# Patient Record
Sex: FEMALE | Race: WHITE | NOT HISPANIC OR LATINO | Employment: OTHER | URBAN - METROPOLITAN AREA
[De-identification: names, ages, dates, MRNs, and addresses within clinical notes are randomized per-mention and may not be internally consistent; named-entity substitution may affect disease eponyms.]

---

## 2017-01-25 RX ORDER — ARIPIPRAZOLE 5 MG/1
TABLET ORAL
Qty: 90 TAB | Refills: 0 | Status: SHIPPED | OUTPATIENT
Start: 2017-01-25 | End: 2017-05-18 | Stop reason: SDUPTHER

## 2017-01-26 RX ORDER — TIOTROPIUM BROMIDE 18 UG/1
CAPSULE ORAL; RESPIRATORY (INHALATION)
Qty: 30 CAP | Refills: 2 | Status: SHIPPED | OUTPATIENT
Start: 2017-01-26 | End: 2018-01-29 | Stop reason: SDUPTHER

## 2017-01-26 NOTE — TELEPHONE ENCOUNTER
Was the patient seen in the last year in this department? Yes     Does patient have an active prescription for medications requested? No     Received Request Via: Patient      Pt met protocol?: Yes  OV 12/29/16

## 2017-02-14 ENCOUNTER — APPOINTMENT (OUTPATIENT)
Dept: BEHAVIORAL HEALTH | Facility: PHYSICIAN GROUP | Age: 77
End: 2017-02-14
Payer: MEDICARE

## 2017-02-27 RX ORDER — LORAZEPAM 0.5 MG/1
TABLET ORAL
Qty: 30 TAB | Refills: 0 | Status: ON HOLD
Start: 2017-02-27 | End: 2018-03-29

## 2017-02-27 RX ORDER — FLUTICASONE PROPIONATE 50 MCG
SPRAY, SUSPENSION (ML) NASAL
Qty: 1 BOTTLE | Refills: 1 | Status: SHIPPED | OUTPATIENT
Start: 2017-02-27 | End: 2017-12-19 | Stop reason: SDUPTHER

## 2017-02-27 RX ORDER — LAMOTRIGINE 100 MG/1
TABLET ORAL
Qty: 270 TAB | Refills: 1 | Status: SHIPPED | OUTPATIENT
Start: 2017-02-27 | End: 2017-07-26

## 2017-02-27 NOTE — TELEPHONE ENCOUNTER
Was the patient seen in the last year in this department? Yes     Does patient have an active prescription for medications requested? No     Received Request Via: Pharmacy      Pt met protocol?: Yes, OV 12/16.

## 2017-03-14 ENCOUNTER — HOSPITAL ENCOUNTER (OUTPATIENT)
Dept: RADIOLOGY | Facility: MEDICAL CENTER | Age: 77
End: 2017-03-14
Attending: NURSE PRACTITIONER
Payer: MEDICARE

## 2017-03-14 DIAGNOSIS — M25.531 RIGHT WRIST PAIN: ICD-10-CM

## 2017-03-14 DIAGNOSIS — M25.562 ARTHRALGIA OF LEFT LOWER LEG: ICD-10-CM

## 2017-03-14 DIAGNOSIS — M25.561 ARTHRALGIA OF RIGHT LOWER LEG: ICD-10-CM

## 2017-03-14 PROCEDURE — 73564 X-RAY EXAM KNEE 4 OR MORE: CPT | Mod: RT

## 2017-03-14 PROCEDURE — 73110 X-RAY EXAM OF WRIST: CPT | Mod: RT

## 2017-03-16 ENCOUNTER — HOSPITAL ENCOUNTER (OUTPATIENT)
Dept: LAB | Facility: MEDICAL CENTER | Age: 77
End: 2017-03-16
Attending: NURSE PRACTITIONER
Payer: MEDICARE

## 2017-03-16 DIAGNOSIS — E11.69 HYPERLIPIDEMIA ASSOCIATED WITH TYPE 2 DIABETES MELLITUS (HCC): ICD-10-CM

## 2017-03-16 DIAGNOSIS — E89.0 HYPOTHYROIDISM, POSTRADIOIODINE THERAPY: ICD-10-CM

## 2017-03-16 DIAGNOSIS — E78.5 HYPERLIPIDEMIA ASSOCIATED WITH TYPE 2 DIABETES MELLITUS (HCC): ICD-10-CM

## 2017-03-16 LAB
CHOLEST SERPL-MCNC: 120 MG/DL (ref 100–199)
HDLC SERPL-MCNC: 62 MG/DL
LDLC SERPL CALC-MCNC: 44 MG/DL
T4 FREE SERPL-MCNC: 1.15 NG/DL (ref 0.53–1.43)
TRIGL SERPL-MCNC: 68 MG/DL (ref 0–149)
TSH SERPL DL<=0.005 MIU/L-ACNC: 0.98 UIU/ML (ref 0.3–3.7)

## 2017-03-16 PROCEDURE — 84439 ASSAY OF FREE THYROXINE: CPT

## 2017-03-16 PROCEDURE — 36415 COLL VENOUS BLD VENIPUNCTURE: CPT

## 2017-03-16 PROCEDURE — 84443 ASSAY THYROID STIM HORMONE: CPT

## 2017-04-03 ENCOUNTER — TELEPHONE (OUTPATIENT)
Dept: VASCULAR LAB | Facility: MEDICAL CENTER | Age: 77
End: 2017-04-03

## 2017-04-03 DIAGNOSIS — I65.22 LEFT CAROTID ARTERY STENOSIS: ICD-10-CM

## 2017-04-03 RX ORDER — LEVOTHYROXINE SODIUM 0.07 MG/1
TABLET ORAL
Qty: 90 TAB | Refills: 2 | Status: SHIPPED | OUTPATIENT
Start: 2017-04-03 | End: 2017-08-09 | Stop reason: SDUPTHER

## 2017-04-04 ENCOUNTER — OFFICE VISIT (OUTPATIENT)
Dept: VASCULAR LAB | Facility: MEDICAL CENTER | Age: 77
End: 2017-04-04
Attending: INTERNAL MEDICINE
Payer: MEDICARE

## 2017-04-04 VITALS
SYSTOLIC BLOOD PRESSURE: 96 MMHG | HEART RATE: 95 BPM | HEIGHT: 55 IN | BODY MASS INDEX: 24.53 KG/M2 | DIASTOLIC BLOOD PRESSURE: 48 MMHG | WEIGHT: 106 LBS

## 2017-04-04 DIAGNOSIS — I25.10 CORONARY ARTERY DISEASE DUE TO LIPID RICH PLAQUE: ICD-10-CM

## 2017-04-04 DIAGNOSIS — I65.22 LEFT CAROTID ARTERY STENOSIS: ICD-10-CM

## 2017-04-04 DIAGNOSIS — F17.200 TOBACCO DEPENDENCE: ICD-10-CM

## 2017-04-04 DIAGNOSIS — E03.9 HYPOTHYROIDISM, UNSPECIFIED TYPE: ICD-10-CM

## 2017-04-04 DIAGNOSIS — I25.83 CORONARY ARTERY DISEASE DUE TO LIPID RICH PLAQUE: ICD-10-CM

## 2017-04-04 DIAGNOSIS — I73.9 PAD (PERIPHERAL ARTERY DISEASE) (HCC): ICD-10-CM

## 2017-04-04 DIAGNOSIS — E11.9 CONTROLLED TYPE 2 DIABETES MELLITUS WITHOUT COMPLICATION, WITHOUT LONG-TERM CURRENT USE OF INSULIN (HCC): ICD-10-CM

## 2017-04-04 DIAGNOSIS — M79.89 LEG SWELLING: ICD-10-CM

## 2017-04-04 DIAGNOSIS — E11.69 HYPERLIPIDEMIA ASSOCIATED WITH TYPE 2 DIABETES MELLITUS (HCC): ICD-10-CM

## 2017-04-04 DIAGNOSIS — E78.5 HYPERLIPIDEMIA ASSOCIATED WITH TYPE 2 DIABETES MELLITUS (HCC): ICD-10-CM

## 2017-04-04 PROCEDURE — 99214 OFFICE O/P EST MOD 30 MIN: CPT | Performed by: INTERNAL MEDICINE

## 2017-04-04 PROCEDURE — 4004F PT TOBACCO SCREEN RCVD TLK: CPT | Performed by: INTERNAL MEDICINE

## 2017-04-04 PROCEDURE — 3046F HEMOGLOBIN A1C LEVEL >9.0%: CPT | Mod: 8P | Performed by: INTERNAL MEDICINE

## 2017-04-04 PROCEDURE — 3288F FALL RISK ASSESSMENT DOCD: CPT | Performed by: INTERNAL MEDICINE

## 2017-04-04 PROCEDURE — 1100F PTFALLS ASSESS-DOCD GE2>/YR: CPT | Performed by: INTERNAL MEDICINE

## 2017-04-04 PROCEDURE — 0518F FALL PLAN OF CARE DOCD: CPT | Mod: 8P | Performed by: INTERNAL MEDICINE

## 2017-04-04 PROCEDURE — G8417 CALC BMI ABV UP PARAM F/U: HCPCS | Performed by: INTERNAL MEDICINE

## 2017-04-04 PROCEDURE — G8432 DEP SCR NOT DOC, RNG: HCPCS | Performed by: INTERNAL MEDICINE

## 2017-04-04 PROCEDURE — G8598 ASA/ANTIPLAT THER USED: HCPCS | Performed by: INTERNAL MEDICINE

## 2017-04-04 PROCEDURE — 4040F PNEUMOC VAC/ADMIN/RCVD: CPT | Performed by: INTERNAL MEDICINE

## 2017-04-04 RX ORDER — ROSUVASTATIN CALCIUM 10 MG/1
10 TABLET, COATED ORAL EVERY EVENING
Qty: 30 TAB | Refills: 11 | Status: ON HOLD | OUTPATIENT
Start: 2017-04-04 | End: 2018-03-29

## 2017-04-04 ASSESSMENT — ENCOUNTER SYMPTOMS
NECK PAIN: 1
SHORTNESS OF BREATH: 0
BACK PAIN: 1
DEPRESSION: 0
HEADACHES: 0
PALPITATIONS: 0
LOSS OF CONSCIOUSNESS: 0
SEIZURES: 0
COUGH: 0
FOCAL WEAKNESS: 0
WEIGHT LOSS: 0
MYALGIAS: 0
CLAUDICATION: 0

## 2017-04-04 NOTE — PATIENT INSTRUCTIONS
Pick quit date  On quit dates start nicoderm CQ 14 microgram patch  Can use gum or lozenge as needed     Change zetia to crestor     Call [phone] to schedule vascular studies (neck and legs)    Blood work one week before follow up visit    Follow Up:  June 28 at 340p    Michael Bloch, MD  [facility]  [phone]

## 2017-04-04 NOTE — PROGRESS NOTES
"  Follow Up VASCULAR VISIT  Subjective:   Yuliet Hammond is a 75 y.o. female who presents today 9-30-16 for   Chief Complaint   Patient presents with   • Follow-Up     HPI:  Patient here for follow-up of carotid artery disease, coronary artery disease, peripheral arterial disease, dyspnea, diabetes, hypertension, smoking.  Carotid endarterectomy Dec 2015  She has had no TIA or stroke symptoms. No angina.   Walking limited, no claudication, no pain at rest.    No skin breakdown  Leg swelling about the same  No angina  Taking zetia but no crestor - expensive  BP at home 90s-100s/40s-50s  On losartan hct  Takes furosemide as needed  Taking 1/2 tab of metformin 2x daily  -130 at home  On ASA and pletal-- no bleeding.  Continues to smoke 1 ppd - tried Chantix-- bothersome side effects, cannot chew gum, lozenges help sometimes.  Wants to quit.    DIET AND EXERCISE:  Weight Change:stable  Diet: common adult  Exercise: doing PT exercises every day    Review of Systems   Constitutional: Positive for malaise/fatigue. Negative for weight loss.   Respiratory: Negative for cough and shortness of breath.    Cardiovascular: Negative for chest pain, palpitations, claudication and leg swelling.   Musculoskeletal: Positive for back pain, joint pain and neck pain. Negative for myalgias.   Neurological: Negative for focal weakness, seizures, loss of consciousness and headaches.   Psychiatric/Behavioral: Negative for depression.      Objective:     Filed Vitals:    04/04/17 1131   BP: 96/48   Pulse: 95   Height: 1.334 m (4' 4.52\")   Weight: 48.081 kg (106 lb)      Body mass index is 27.02 kg/(m^2).  Physical Exam   Constitutional: She is oriented to person, place, and time. No distress.   Cardiovascular: Normal rate, regular rhythm and normal heart sounds.    No murmur heard.  Decreased but palpable pulses in both LE     Pulmonary/Chest: Effort normal and breath sounds normal. No respiratory distress. She has no wheezes. She " has no rales.   Musculoskeletal: She exhibits no edema.   Neurological: She is alert and oriented to person, place, and time. No cranial nerve deficit. Coordination normal.   Skin: Skin is warm and dry. She is not diaphoretic.   Psychiatric: She has a normal mood and affect.   Vitals reviewed.    Lab Results   Component Value Date    CHOLSTRLTOT 120 03/16/2017    LDL 44 03/16/2017    HDL 62 03/16/2017    TRIGLYCERIDE 68 03/16/2017         Lab Results   Component Value Date    HBA1C 6.7* 10/07/2016      Lab Results   Component Value Date    SODIUM 131* 10/07/2016    POTASSIUM 3.7 10/07/2016    CHLORIDE 94* 10/07/2016    CO2 26 10/07/2016    GLUCOSE 99 10/07/2016    BUN 16 10/07/2016    CREATININE 0.94 10/07/2016    IFAFRICA >60 10/07/2016    IFNOTAFR 58* 10/07/2016        Lab Results   Component Value Date    WBC 6.3 01/12/2016    RBC 4.09* 01/12/2016    HEMOGLOBIN 12.3 01/12/2016    HEMATOCRIT 38.9 01/12/2016    MCV 95.1 01/12/2016    MCH 30.1 01/12/2016    MCHC 31.6* 01/12/2016    MPV 12.2 01/12/2016      Carotid duplex March 2016  Antegrade flow in both vertebrals. Moderate right internal carotid stenosis. Left carotid endarterectomy site however focal plaque seen at the proximal end of the surgical site appearing to cause a moderate stenosis. There is also some plaque distal to the surgical site which causes some degree of stenosis    CTA Neck: April 2016  Impression        1.  Extensive calcified plaque right carotid artery bifurcation resulting in 70% stenosis at the origin the right internal carotid artery.  2.  Postoperative change left carotid artery. Ulcerated plaque but no significant stenosis identified.  3.  Marked stenosis at the origin of the left vertebral artery. Vertebral arteries otherwise are patent. No dissection.     LE Arterial Dulex sep 2016:  LES R 1.09 L 1.08   minimal aortic plaquing with no significant stenoses or aneurysmal dilatation  50% left common iliac artery stenosis  Minimal right  common iliac stenosis    Medical Decision Making:  Today's Assessment / Status / Plan:     1. Left carotid artery stenosis     2. Hyperlipidemia associated with type 2 diabetes mellitus (CMS-HCC)  rosuvastatin (CRESTOR) 10 MG Tab    LIPOPROTEIN QT BLOOD BY NMR    COMP METABOLIC PANEL    LIPID PROFILE   3. Coronary artery disease due to lipid rich plaque  CBC WITHOUT DIFFERENTIAL   4. Tobacco dependence     5. Controlled type 2 diabetes mellitus without complication, without long-term current use of insulin (CMS-HCC)  HEMOGLOBIN A1C   6. PAD (peripheral artery disease) (CMS-HCC)  ARTERIAL STUDY FOR PREVIOUS INTERVENTION (Regional only)   7. Leg swelling  BTYPE NATRIURETIC PEPTIDE   8. Hypothyroidism, unspecified type  TSH     Patient Type: Secondary Prevention    Etiology of Established CVD if Present:   1.  carotid disease status post carotid endarterectomy in 2016  2. peripheral arterial disease status post right iliac stent   3. coronary artery disease status post CABG currently without angina    Lipid Management: Qualifies for Statin Therapy Based on 2013 ACC/AHA Guidelines: yes  Calculated 10-Year Risk of ASCVD: N/A  Currently on Statin: Yes  Lp(a) normal  Indication for at least moderate intensity statin  Has been intermittently adherent in past  Now with good control but on zetia monotherapy (stopped crestor for unclear reasons)  Plan:  - Restart crestor 10 mg daily - take every day  - hold zetia for now  - Continue lifestyle modification  - Recheck fasting lipids prior to next visit  - Consider intensification of therapy if above goal    Blood Pressure Management:Goal: JNC8 (2013) Office BP Goal:<140/90; Under Control: yes  Blood pressure under excellent control both at home and in the office  Plan:  - Continue current hypertensive medications  - Continue home blood pressure monitoring  - Recheck GFR electrolytes prior to next visit    Glycemic Status: Diabetic  A1c under excellent control  Plan:  - Continue  1/2 tab metformin to tablet twice daily  - Recheck A1c prior to next visit  - Recheck urine for albumin  - If A1c remains under good control consider stopping metformin- waiting for labwork   - Discussed that her diabetic diet  - Recommended yearly flu shot    Anti-Platelet/Anti-Coagulant Tx: yes  - Continue aspirin and cilostazol    Smoking: Recommended complete smoking cessation  Not a good candidate for Chantix or Wellbutrin  Willing to retry patch  Not willing to go to smoking cessation classes at this time  Plan:  - Pick quit date  - 14 microgram patch  - lozenge or gum as needed  - Consider smoking cessation classes again in future-- discussed but she does not want to at this time.    Physical Activity: As much walking as possible    Weight Management and Nutrition: Dietary plan was discussed with patient at this visit including better diabetic diet    Other:     1.  carotid disease status post recent carotid endarterectomy with some evidence of residual stenosis on carotid duplex and at least moderate contralateral stenosis  - Continue medical management as above  - Due for surveillance imaging - start with carotid duplex - consider repeat CTA depending on results    2. peripheral arterial disease status post right iliac stent without lifestyle limiting claudication at present. Recent angiogram showed no significant target of intervention. No evidence of critical limb ischemia. She does have evidence of small vessel disease and perhaps some vasospasm.   - Continue medical management as above.   - Check noninvasive vascular studies   - Call immediately if she begins to develop any skin breakdown or rest pain (patient verbalized understanding)    3. coronary artery disease status post CABG currently without angina - continue medical management as above.  Check BNP to r/p heart failure given leg swelling.  Consider repeat echo if elevated    4. Hypothyroidism -appears under good control. Defer management to  PCP    5. Hip pain - defer management to orthopedics and PCP    6. Affective disorder - defer management to PCP    Instructed to follow-up with PCP for remainder of adult medical needs: yes  We will partner with other providers in the management of established vascular disease and cardiometabolic risk factors.    Studies to Be Obtained:   1. Carotid duplex- April 2017  2.  aortoiliac duplex, lower extremity arterial duplex, and LES - in april 2017    Labs to Be Obtained: As above prior to next visit    Follow up in: 3 months     Michael J Bloch, M.D.    CC:  MD Feliciano Wise MD John Hansen, MD

## 2017-04-10 ENCOUNTER — OFFICE VISIT (OUTPATIENT)
Dept: MEDICAL GROUP | Facility: PHYSICIAN GROUP | Age: 77
End: 2017-04-10
Payer: MEDICARE

## 2017-04-10 ENCOUNTER — HOSPITAL ENCOUNTER (OUTPATIENT)
Facility: MEDICAL CENTER | Age: 77
End: 2017-04-10
Attending: NURSE PRACTITIONER
Payer: MEDICARE

## 2017-04-10 VITALS
HEIGHT: 55 IN | DIASTOLIC BLOOD PRESSURE: 80 MMHG | BODY MASS INDEX: 25.69 KG/M2 | SYSTOLIC BLOOD PRESSURE: 124 MMHG | TEMPERATURE: 97.5 F | OXYGEN SATURATION: 90 % | WEIGHT: 111 LBS | RESPIRATION RATE: 16 BRPM | HEART RATE: 96 BPM

## 2017-04-10 DIAGNOSIS — E78.5 HYPERLIPIDEMIA ASSOCIATED WITH TYPE 2 DIABETES MELLITUS (HCC): ICD-10-CM

## 2017-04-10 DIAGNOSIS — E11.8 CONTROLLED TYPE 2 DIABETES MELLITUS WITH COMPLICATION, WITHOUT LONG-TERM CURRENT USE OF INSULIN (HCC): ICD-10-CM

## 2017-04-10 DIAGNOSIS — F33.2 SEVERE EPISODE OF RECURRENT MAJOR DEPRESSIVE DISORDER, WITHOUT PSYCHOTIC FEATURES (HCC): ICD-10-CM

## 2017-04-10 DIAGNOSIS — E11.69 HYPERLIPIDEMIA ASSOCIATED WITH TYPE 2 DIABETES MELLITUS (HCC): ICD-10-CM

## 2017-04-10 DIAGNOSIS — E89.0 HYPOTHYROIDISM, POSTRADIOIODINE THERAPY: ICD-10-CM

## 2017-04-10 DIAGNOSIS — I15.2 HYPERTENSION ASSOCIATED WITH DIABETES (HCC): ICD-10-CM

## 2017-04-10 DIAGNOSIS — F17.210 NICOTINE DEPENDENCE, CIGARETTES, UNCOMPLICATED: ICD-10-CM

## 2017-04-10 DIAGNOSIS — E11.59 HYPERTENSION ASSOCIATED WITH DIABETES (HCC): ICD-10-CM

## 2017-04-10 LAB
HBA1C MFR BLD: 6.5 % (ref ?–5.8)
INT CON NEG: NEGATIVE
INT CON POS: POSITIVE

## 2017-04-10 PROCEDURE — 82043 UR ALBUMIN QUANTITATIVE: CPT

## 2017-04-10 PROCEDURE — 99214 OFFICE O/P EST MOD 30 MIN: CPT | Mod: 25 | Performed by: NURSE PRACTITIONER

## 2017-04-10 PROCEDURE — G8432 DEP SCR NOT DOC, RNG: HCPCS | Performed by: NURSE PRACTITIONER

## 2017-04-10 PROCEDURE — 3044F HG A1C LEVEL LT 7.0%: CPT | Performed by: NURSE PRACTITIONER

## 2017-04-10 PROCEDURE — 99000 SPECIMEN HANDLING OFFICE-LAB: CPT | Performed by: NURSE PRACTITIONER

## 2017-04-10 PROCEDURE — 0518F FALL PLAN OF CARE DOCD: CPT | Mod: 8P | Performed by: NURSE PRACTITIONER

## 2017-04-10 PROCEDURE — 82570 ASSAY OF URINE CREATININE: CPT

## 2017-04-10 PROCEDURE — 1100F PTFALLS ASSESS-DOCD GE2>/YR: CPT | Performed by: NURSE PRACTITIONER

## 2017-04-10 PROCEDURE — 99407 BEHAV CHNG SMOKING > 10 MIN: CPT | Performed by: NURSE PRACTITIONER

## 2017-04-10 PROCEDURE — G8598 ASA/ANTIPLAT THER USED: HCPCS | Performed by: NURSE PRACTITIONER

## 2017-04-10 PROCEDURE — G8417 CALC BMI ABV UP PARAM F/U: HCPCS | Performed by: NURSE PRACTITIONER

## 2017-04-10 PROCEDURE — 4040F PNEUMOC VAC/ADMIN/RCVD: CPT | Performed by: NURSE PRACTITIONER

## 2017-04-10 PROCEDURE — 4004F PT TOBACCO SCREEN RCVD TLK: CPT | Performed by: NURSE PRACTITIONER

## 2017-04-10 PROCEDURE — 3288F FALL RISK ASSESSMENT DOCD: CPT | Performed by: NURSE PRACTITIONER

## 2017-04-10 PROCEDURE — 83036 HEMOGLOBIN GLYCOSYLATED A1C: CPT | Performed by: NURSE PRACTITIONER

## 2017-04-10 NOTE — MR AVS SNAPSHOT
"Yuliet Hammond   4/10/2017 4:55 PM   Office Visit   MRN: [id number]    Department:  [facility]   Dept Phone:  [phone]    Description:  Female : 1940   Provider:  SIOBHAN Cao           Reason for Visit     Hypertension follow up       Allergies as of 4/10/2017     Allergen Noted Reactions    Codeine 2010   Vomiting    Sulfa Drugs 2010   Hives    Tape 2015       Paper ok      You were diagnosed with     Controlled type 2 diabetes mellitus with complication, without long-term current use of insulin (CMS-HCC)   [6057246]       Hypertension associated with diabetes (CMS-HCC)   [631948]       Hyperlipidemia associated with type 2 diabetes mellitus (CMS-HCC)   [9731019]       Nicotine dependence, cigarettes, uncomplicated   [5684658]       Hypothyroidism, postradioiodine therapy   [999893]       Severe episode of recurrent major depressive disorder, without psychotic features (CMS-HCC)   [0338980]         Vital Signs     Blood Pressure Pulse Temperature Respirations Height Weight    124/80 mmHg 96 36.4 °C (97.5 °F) 16 1.334 m (4' 4.5\") 50.349 kg (111 lb)    Body Mass Index Oxygen Saturation Smoking Status             28.29 kg/m2 90% Current Every Day Smoker         Basic Information     Date Of Birth Sex Race Ethnicity Preferred Language    1940 Female White Non- English      Your appointments     2017  3:30 PM   Follow Up Med Management with Jean Marie Solis M.D.   [facility] [address]    [address]  [address]  [place] [facility] [place] [address]   [phone]            2017  3:15 PM   LOWER EXTREMITY ART DUPLEX with VASCULAR LAB [facility] EXAM 8   NON-INVASIVE LAB [facility] ([facility]    [address] [address]   [phone]           No prep            2017  3:40 PM   Follow Up Visit with VASCULAR NURSE PRACTITIONER   [facility]  (--)    [address]  [place] [address]   [phone]            Jul 13, 2017  4:55 PM   Established Patient with SIOBHAN Cao   [facility]    [address], [address]  [place] [id number] [phone]           You will be receiving a confirmation call a few days before your appointment from our automated call confirmation system.              Problem List              ICD-10-CM Priority Class Noted - Resolved    Severe episode of recurrent major depressive disorder, without psychotic features (CMS-HCC) F33.2   7/27/2010 - Present    Hyperlipidemia associated with type 2 diabetes mellitus (CMS-HCC) E11.69, E78.5   7/27/2010 - Present    Hypertension associated with diabetes (CMS-HCC) E11.59, I10   7/27/2010 - Present    Diabetes mellitus type 2, controlled (CMS-HCC) E11.9   7/27/2010 - Present    History of lung cancer Z85.118   7/27/2010 - Present    CAD (coronary artery disease) I25.10   7/27/2010 - Present    S/P CABG (coronary artery bypass graft) Z95.1   7/27/2010 - Present    Peripheral arterial disease (CMS-HCC) I73.9   7/27/2010 - Present    Carotid bruit R09.89   7/27/2010 - Present    Abdominal bruit R09.89   7/27/2010 - Present    Intermittent claudication (CMS-HCC) I73.9   10/12/2010 - Present    Nicotine dependence, cigarettes, uncomplicated F17.210   1/17/2011 - Present    COPD (chronic obstructive pulmonary disease) (CMS-HCC) J44.9   2/2/2011 - Present    Blue toes R23.0   2/2/2011 - Present    Chronic pain G89.29   7/26/2015 - Present    Osteopenia M85.80   8/23/2015 - Present    Risk for falls Z91.81   8/23/2015 - Present    Intertrochanteric fracture of left femur (CMS-HCC) S72.142A   9/27/2015 - Present    Left carotid artery stenosis I65.22   12/16/2015 - Present    Iron deficiency anemia D50.9   10/27/2016 - Present    Hypothyroidism, postradioiodine therapy E89.0   10/27/2016 - Present      Health Maintenance        Date Due Completion Dates    IMM  ZOSTER VACCINE 6/10/2001 ---    RETINAL SCREENING 8/27/2016 8/27/2015    DIABETES MONOFILAMENT / LE EXAM 11/24/2016 11/24/2015 (Declined)    Override on 11/24/2015: Patient Declined (has upcoming appt with podiatry.)    IMM PNEUMOCOCCAL 65+ (ADULT) LOW/MEDIUM RISK SERIES (2 of 2 - PPSV23) 12/17/2016 12/17/2015    URINE ACR / MICROALBUMIN 3/30/2017 3/30/2016, 7/27/2015    A1C SCREENING 4/7/2017 10/7/2016, 3/30/2016, 7/27/2015, 1/14/2011, 8/3/2010    MAMMOGRAM 8/7/2017 8/7/2015    SERUM CREATININE 10/7/2017 10/7/2016, 3/30/2016, 1/11/2016, 12/11/2015, 7/27/2015, 1/14/2011, 8/3/2010    FASTING LIPID PROFILE 3/16/2018 3/16/2017, 10/7/2016, 3/30/2016, 1/11/2016, 7/27/2015, 1/14/2011, 8/3/2010    BONE DENSITY 8/7/2020 8/7/2015    COLONOSCOPY 10/1/2020 10/1/2015    IMM DTaP/Tdap/Td Vaccine (3 - Td) 3/20/2025 3/20/2015, 1/24/2012            Current Immunizations     13-VALENT PCV PREVNAR 12/17/2015  6:09 AM    Influenza Vaccine Adult HD 10/27/2016    Influenza Vaccine Quad Inj (Preserved) 11/23/2015    Tdap Vaccine 3/20/2015, 1/24/2012      Below and/or attached are the medications your provider expects you to take. Review all of your home medications and newly ordered medications with your provider and/or pharmacist. Follow medication instructions as directed by your provider and/or pharmacist. Please keep your medication list with you and share with your provider. Update the information when medications are discontinued, doses are changed, or new medications (including over-the-counter products) are added; and carry medication information at all times in the event of emergency situations     Allergies:  CODEINE - Vomiting     SULFA DRUGS - Hives     TAPE - (reactions not documented)               Medications  Valid as of: April 10, 2017 -  5:42 PM    Generic Name Brand Name Tablet Size Instructions for use    Albuterol Sulfate (Aero Soln) albuterol 108 (90 BASE) MCG/ACT Inhale 1-2 Puffs by mouth every 6 hours as needed.  Give albuterol that is patient or insurance preference please        ARIPiprazole (Tab) ABILIFY 5 MG TAKE 1 TABLET BY MOUTH ONCE DAILY IN THE MORNING        Aspirin (Tab) aspirin 81 MG Take 81 mg by mouth every day.        Baclofen (Tab) LIORESAL 10 MG Take 10 mg by mouth 2 times a day.        Cilostazol (Tab) PLETAL 100 MG TAKE 1 TABLET BY MOUTH TWICE DAILY(LEG PAIN)        Diclofenac Sodium (Gel) Diclofenac Sodium 1 % Apply thin layer to affected area tid, prn.        DULoxetine HCl (Cap DR Particles) CYMBALTA 60 MG Take 2 Caps by mouth every bedtime. Indications: Generalized Anxiety Disorder, Major Depressive Disorder        Fluticasone Propionate (Suspension) FLONASE 50 MCG/ACT SHAKE LIQUID AND USE 1 SPRAY IN EACH NOSTRIL TWICE DAILY        Furosemide (Tab) LASIX 20 MG TAKE 1-2 TABLET BY MOUTH DAILY PRN for edema        LamoTRIgine (Tab) LAMICTAL 100 MG TAKE 3 TABLETS BY MOUTH ONCE DAILY AT BEDTIME FOR DEPRESSION        Levothyroxine Sodium (Tab) SYNTHROID 75 MCG TAKE 1 TABLET BY MOUTH ONCE DAILY IN THE MORNING ON AN EMPTY STOMACH        LORazepam (Tab) ATIVAN 0.5 MG TAKE 1 TABLET BY MOUTH EVERY 12 HOURS AS NEEDED FOR ANXIETY        Losartan Potassium-HCTZ (Tab) HYZAAR 50-12.5 MG TAKE 1 TABLET BY MOUTH EVERY DAY        MetFORMIN HCl (Tab) GLUCOPHAGE 1000 MG TAKE 1 TABLET BY MOUTH TWICE DAILY        Mupirocin Calcium (Cream) BACTROBAN 2 % Apply small amount to affected area bid for up to 10 days.        Oxycodone-Acetaminophen (Tab) PERCOCET-10  MG Take 1 Tab by mouth every four hours as needed for Severe Pain.        Potassium Chloride (Tab CR) KLOR-CON 8 MEQ TAKE 1 TABLET BY MOUTH ONCE DAILY        Rosuvastatin Calcium (Tab) CRESTOR 10 MG Take 1 Tab by mouth every evening.        Sennosides-Docusate Sodium (Tab) PERICOLACE or SENOKOT S 8.6-50 MG Take 1 Tab by mouth every day.        Tiotropium Bromide Monohydrate (Cap) SPIRIVA HANDIHALER 18 MCG INHALE THE CONTENTS OF 1 CAPSULE BY MOUTH VIA HANDIHALER ONCE  DAILY        .                 Medicines prescribed today were sent to:     [id number]  [place], [place] - [address] AT [facility] [address]    [address] [place] [address]    Phone: [phone] Fax: [phone]    Open 24 Hours?: No      Medication refill instructions:       If your prescription bottle indicates you have medication refills left, it is not necessary to call your provider’s office. Please contact your pharmacy and they will refill your medication.    If your prescription bottle indicates you do not have any refills left, you may request refills at any time through one of the following ways: The online [employer] system (except Urgent Care), by calling your provider’s office, or by asking your pharmacy to contact your provider’s office with a refill request. Medication refills are processed only during regular business hours and may not be available until the next business day. Your provider may request additional information or to have a follow-up visit with you prior to refilling your medication.   *Please Note: Medication refills are assigned a new Rx number when refilled electronically. Your pharmacy may indicate that no refills were authorized even though a new prescription for the same medication is available at the pharmacy. Please request the medicine by name with the pharmacy before contacting your provider for a refill.        Your To Do List     Future Labs/Procedures Complete By Expires    MICROALBUMIN CREAT RATIO URINE  As directed 4/11/2018         [employer] Status: Patient Declined        Quit Tobacco Information     Do you want to quit using tobacco?    Quitting tobacco decreases risks of cancer, heart and lung disease, increases life expectancy, improves sense of taste and smell, and increases spending money, among other benefits.    If you are thinking about quitting, we can help.  • [facility] Quit Tobacco Program: 053-314-2255  o Program occurs weekly for four  weeks and includes pharmacist consultation on products to support quitting smoking or chewing tobacco. A provider referral is needed for pharmacist consultation.  • Tobacco Users Help Hotline: 2-099-QUIT-NOW (199-1115) or https://[place].quitlogix.org/  o Free, confidential telephone and online coaching for [place] residents. Sessions are designed on a schedule that is convenient for you. Eligible clients receive free nicotine replacement therapy.  • Nationally: www.smokefree.gov  o Information and professional assistance to support both immediate and long-term needs as you become, and remain, a non-smoker. Smokefree.gov allows you to choose the help that best fits your needs.

## 2017-04-11 DIAGNOSIS — E11.8 CONTROLLED TYPE 2 DIABETES MELLITUS WITH COMPLICATION, WITHOUT LONG-TERM CURRENT USE OF INSULIN (HCC): ICD-10-CM

## 2017-04-11 LAB
AMBIGUOUS DTTM AMBI4: NORMAL
CREAT UR-MCNC: 161.2 MG/DL
MICROALBUMIN UR-MCNC: 4.5 MG/DL
MICROALBUMIN/CREAT UR: 28 MG/G (ref 0–30)

## 2017-04-11 NOTE — PROGRESS NOTES
Subjective:     Chief Complaint   Patient presents with   • Hypertension     follow up         Yuliet Hammond is a 76 y.o. female here today for evaluation and management of:    Hyperlipidemia associated with type 2 diabetes mellitus (CMS-HCC)  Managed with Crestor 20 mg daily.  3.16.17 FLP in range.    Hypertension associated with diabetes (CMS-[facility])  Managed with losartan with hydrochlorothiazide 50-12.5 mg with Lasix 20 mg prn for edema. She has been monitoring at home and all readings are within range.       Hypothyroidism, postradioiodine therapy  She had been taking levothyroxine 75 mcg in the morning prior to additional medications. Recent TSH in range.      Nicotine dependence, cigarettes, uncomplicated  Would like to quot smoking but is struggling with lack of options for medical assistance. Plans to discuss with psychiatrist at upcoming appointment.        Severe episode of recurrent major depressive disorder, without psychotic features (CMS-[facility])  Has been followed by psychiatry who prescribes medications.      ROS   Denies HA, chest pain,  abdominal pain, bladder or bowel changes, lower extremity edema.    Current medicines (including changes today)  Current Outpatient Prescriptions   Medication Sig Dispense Refill   • rosuvastatin (CRESTOR) 10 MG Tab Take 1 Tab by mouth every evening. 30 Tab 11   • levothyroxine (SYNTHROID) 75 MCG Tab TAKE 1 TABLET BY MOUTH ONCE DAILY IN THE MORNING ON AN EMPTY STOMACH 90 Tab 2   • lamotrigine (LAMICTAL) 100 MG Tab TAKE 3 TABLETS BY MOUTH ONCE DAILY AT BEDTIME FOR DEPRESSION 270 Tab 1   • fluticasone (FLONASE) 50 MCG/ACT nasal spray SHAKE LIQUID AND USE 1 SPRAY IN EACH NOSTRIL TWICE DAILY 1 Bottle 1   • lorazepam (ATIVAN) 0.5 MG Tab TAKE 1 TABLET BY MOUTH EVERY 12 HOURS AS NEEDED FOR ANXIETY 30 Tab 0   • SPIRIVA HANDIHALER 18 MCG Cap INHALE THE CONTENTS OF 1 CAPSULE BY MOUTH VIA HANDIHALER ONCE DAILY 30 Cap 2   • aripiprazole (ABILIFY) 5 MG tablet TAKE 1 TABLET BY  MOUTH ONCE DAILY IN THE MORNING 90 Tab 0   • mupirocin calcium (BACTROBAN) 2 % Cream Apply small amount to affected area bid for up to 10 days. 1 Tube 0   • duloxetine (CYMBALTA) 60 MG Cap DR Particles delayed-release capsule Take 2 Caps by mouth every bedtime. Indications: Generalized Anxiety Disorder, Major Depressive Disorder 180 Cap 1   • losartan-hydrochlorothiazide (HYZAAR) 50-12.5 MG per tablet TAKE 1 TABLET BY MOUTH EVERY DAY 90 Tab 1   • albuterol 108 (90 BASE) MCG/ACT Aero Soln inhalation aerosol Inhale 1-2 Puffs by mouth every 6 hours as needed. Give albuterol that is patient or insurance preference please 8.5 g 1   • KLOR-CON 8 MEQ tablet TAKE 1 TABLET BY MOUTH ONCE DAILY 90 Tab 1   • metformin (GLUCOPHAGE) 1000 MG tablet TAKE 1 TABLET BY MOUTH TWICE DAILY 180 Tab 0   • cilostazol (PLETAL) 100 MG Tab TAKE 1 TABLET BY MOUTH TWICE DAILY(LEG PAIN) 90 Tab 3   • Oxycodone-Acetaminophen (PERCOCET-10)  MG Tab Take 1 Tab by mouth every four hours as needed for Severe Pain.     • furosemide (LASIX) 20 MG Tab TAKE 1-2 TABLET BY MOUTH DAILY PRN for edema 60 Tab 0   • senna-docusate (PERICOLACE OR SENOKOT S) 8.6-50 MG Tab Take 1 Tab by mouth every day.     • baclofen (LIORESAL) 10 MG TABS Take 10 mg by mouth 2 times a day.     • Diclofenac Sodium (VOLTAREN) 1 % GEL Apply thin layer to affected area tid, prn. 1 Tube 1   • aspirin 81 MG tablet Take 81 mg by mouth every day.       No current facility-administered medications for this visit.       She  has a past medical history of Depression (7/27/2010); Hyperlipidemia (7/27/2010); Diabetes; S/P CABG (coronary artery bypass graft) (7/27/2010); Hypertension; Anxiety; Cancer (CMS-HCC) (2008); COPD (chronic obstructive pulmonary disease) (CMS-HCC); Thyroid disease; Muscle disorder; Tobacco dependence; Arthritis; Urinary incontinence; Dental disorder; Cataract; Pain (12-10-15); Scoliosis; PAD (peripheral artery disease) (CMS-HCC); Asthma; and Urinary bladder  "disorder.    Allergies Codeine; Sulfa drugs; and Tape    Current medications, problem list, allergies, past medical history, and tobacco use history reviewed in EPIC today.    Health maintenance reviewed and updated.    Objective:   Blood pressure 124/80, pulse 96, temperature 36.4 °C (97.5 °F), resp. rate 16, height 1.334 m (4' 4.5\"), weight 50.349 kg (111 lb), SpO2 90 %. Body mass index is 28.29 kg/(m^2).     Physical Exam   Constitutional: Alert, no acute distress. Pleasant and cooperative with the examination.  Skin:   Warm, dry, no rashes in visible areas.    Eyes:   Pupils equal, round. Conjunctiva and sclera clear,    Lids normal.  ENT:  Pinna normal.   Neck:   Supple, trachea midline.  Lungs:  Normal effort and respirations. Clear to auscultation bilaterally.  CV:  Regular rate and rhythm.  MS/Ext:  Steady gait, no edema.  Psych:  Eye contact is good, affect calm.    Assessment and Plan:   The following treatment plan was discussed    1. Controlled type 2 diabetes mellitus with complication, without long-term current use of insulin (CMS-[facility])  Stable. Continue current medicines. Monitor labs regularly.   A1c today 6.5.     - HEMOGLOBIN A1C; Future  - MICROALBUMIN CREAT RATIO URINE; Future    2. Hypertension associated with diabetes (CMS-[facility])  Stable. Continue current medicines and follow up with specialist. Monitor labs regularly.       3. Hyperlipidemia associated with type 2 diabetes mellitus (CMS-[facility])  Stable.  Continue current medicines and follow up with specialist. Monitor labs regularly.        4. Nicotine dependence, cigarettes, uncomplicated  Counseled more than 10 minutes regarding smoking cessation. Advised to quit.     5. Hypothyroidism, postradioiodine therapy  Stable. Continue current medicines. Monitor labs regularly.        6. Severe episode of recurrent major depressive disorder, without psychotic features (CMS-[facility])  Stable. Continue current medicines. Monitor labs regularly. Follow-up with " specialist as directed.     Followup: Return in about 3 months (around 7/10/2017) for multiple chronic.  As scheduled, sooner if symptoms don't resolve or with any new problems.         Reviewed side effects, risks, and benefits of medications prescribed today.  Advised to take all medications as instructed and report any side effects.   The patient voices understanding and agrees.  Report any new or worsening symptoms.  Have labs or other diagnostic studies prior to follow up.  Keep all appointments for any referrals given.      Please note this dictation was created using voice recognition software. Every reasonable attempt has been made to correct obvious errors, however there may be errors of grammar and possibly content that were not discovered before finalizing the note.

## 2017-04-11 NOTE — ASSESSMENT & PLAN NOTE
She had been taking levothyroxine 75 mcg in the morning prior to additional medications. Recent TSH in range.

## 2017-04-11 NOTE — ASSESSMENT & PLAN NOTE
Managed with losartan with hydrochlorothiazide 50-12.5 mg with Lasix 20 mg prn for edema. She has been monitoring at home and all readings are within range.

## 2017-04-11 NOTE — ASSESSMENT & PLAN NOTE
Would like to quot smoking but is struggling with lack of options for medical assistance. Plans to discuss with psychiatrist at upcoming appointment.

## 2017-04-12 ENCOUNTER — OFFICE VISIT (OUTPATIENT)
Dept: BEHAVIORAL HEALTH | Facility: PHYSICIAN GROUP | Age: 77
End: 2017-04-12
Payer: MEDICARE

## 2017-04-12 VITALS
RESPIRATION RATE: 16 BRPM | WEIGHT: 110 LBS | TEMPERATURE: 98.1 F | BODY MASS INDEX: 25.46 KG/M2 | DIASTOLIC BLOOD PRESSURE: 72 MMHG | HEART RATE: 86 BPM | SYSTOLIC BLOOD PRESSURE: 120 MMHG | HEIGHT: 55 IN

## 2017-04-12 DIAGNOSIS — F31.81 BIPOLAR 2 DISORDER (HCC): ICD-10-CM

## 2017-04-12 PROCEDURE — 1100F PTFALLS ASSESS-DOCD GE2>/YR: CPT | Performed by: PSYCHIATRY & NEUROLOGY

## 2017-04-12 PROCEDURE — G8598 ASA/ANTIPLAT THER USED: HCPCS | Performed by: PSYCHIATRY & NEUROLOGY

## 2017-04-12 PROCEDURE — 3288F FALL RISK ASSESSMENT DOCD: CPT | Performed by: PSYCHIATRY & NEUROLOGY

## 2017-04-12 PROCEDURE — 99213 OFFICE O/P EST LOW 20 MIN: CPT | Performed by: PSYCHIATRY & NEUROLOGY

## 2017-04-12 PROCEDURE — 4040F PNEUMOC VAC/ADMIN/RCVD: CPT | Performed by: PSYCHIATRY & NEUROLOGY

## 2017-04-12 PROCEDURE — 0518F FALL PLAN OF CARE DOCD: CPT | Mod: 8P | Performed by: PSYCHIATRY & NEUROLOGY

## 2017-04-12 PROCEDURE — G8432 DEP SCR NOT DOC, RNG: HCPCS | Performed by: PSYCHIATRY & NEUROLOGY

## 2017-04-12 PROCEDURE — 4004F PT TOBACCO SCREEN RCVD TLK: CPT | Performed by: PSYCHIATRY & NEUROLOGY

## 2017-04-12 PROCEDURE — G8417 CALC BMI ABV UP PARAM F/U: HCPCS | Performed by: PSYCHIATRY & NEUROLOGY

## 2017-04-12 RX ORDER — LAMOTRIGINE 200 MG/1
200 TABLET ORAL 2 TIMES DAILY
Qty: 60 TAB | Refills: 5 | Status: SHIPPED | OUTPATIENT
Start: 2017-04-12 | End: 2017-12-27 | Stop reason: SDUPTHER

## 2017-04-12 NOTE — PROGRESS NOTES
"[facility]  PSYCHIATRIC FOLLOW-UP NOTE    Name: Yuliet Hammond  MRN: [id number]  : 1940  Age: 76 y.o.  Date of assessment: 2017  PCP: SIOBHAN Cao  Persons in attendance: Patient    REASON FOR VISIT/CHIEF COMPLAINT (as stated by Patient):  Yuliet Hammond is a 76 y.o., White female, attending follow-up appointment for   Chief Complaint   Patient presents with   • Agitation     The patient is seen today for follow up and she reported more agitation and anxiety all the time.    .      HISTORY OF PRESENT ILLNESS:    The patient is seen today for follow up on her bipolar disorder and she is more agitated, anxious all the time.  The patient denied depression.    PSYCHOSOCIAL CHANGES SINCE PREVIOUS CONTACT:  agitation    RESPONSE TO TREATMENT:  Fair.    MEDICATION SIDE EFFECTS:  Denied.    REVIEW OF SYSTEMS:        Constitutional positive - chronic pain syndrome   Eyes negative   Ears/Nose/Mouth/Throat negative   Cardiovascular positive - coronary artery disease, hypertension, peripheral arterial disease   Respiratory positive - COPD, lung cancer treated   Gastrointestinal negative   Genitourinary negative   Muscular negative   Integumentary negative   Neurological negative   Endocrine positive - hyperlipidemia, diabetes, hypothyroidism   Hematologic/Lymphatic negative       PSYCHIATRIC EXAMINATION/MENTAL STATUS  /72 mmHg  Pulse 86  Temp(Src) 36.7 °C (98.1 °F)  Resp 16  Ht 1.334 m (4' 4.52\")  Wt 49.896 kg (110 lb)  BMI 28.04 kg/m2  Participation: Active verbal participation, Attentive and Open to feedback  Grooming:Casual  Orientation: Alert and Fully Oriented  Eye contact: Good  Behavior:Calm   Mood: Anxious  Affect: Flexible and Full range  Thought process: Logical and Goal-directed  Thought content:  Within normal limits  Speech: Rate within normal limits and Volume within normal limits  Perception:  Within normal limits  Memory:  Poor memory for chronology of " events  Insight: Good  Judgment: Good  Family/couple interaction observations:   Other:    Current risk:    Suicide: Low   Homicide: Low   Self-harm: Low  Relapse: Low  Other:   Crisis Safety Plan reviewed?Yes  If evidence of imminent risk is present, intervention/plan:    Medical Records/Labs/Diagnostic Tests Reviewed: yes.    Medical Records/Labs/Diagnostic Tests Ordered: none.    DIAGNOSTIC IMPRESSION(S):  1. Bipolar 2 disorder (CMS-[facility])           ASSESSMENT AND PLAN:  Bipolar 2 , agitated.    Increase lamotrigine 350 mg for one week then 200 mg one twice a day # 60 refills five.  Abilify 5 mg per day  Duloxetine 60 mg two per day  Lorazepam 0.5 mg per day as needed.  Follow up in three months.    More than 50% of face-to-face time in this 30 minute visit was spent in psychotherapy/counseling.    Topics addressed include:  Good sleep hygiene  Cut down on smoking.  Jean Marie Solis M.D.

## 2017-04-18 ENCOUNTER — HOSPITAL ENCOUNTER (OUTPATIENT)
Dept: RADIOLOGY | Facility: MEDICAL CENTER | Age: 77
End: 2017-04-18
Attending: INTERNAL MEDICINE
Payer: MEDICARE

## 2017-04-18 DIAGNOSIS — I73.9 PAD (PERIPHERAL ARTERY DISEASE) (HCC): ICD-10-CM

## 2017-04-18 PROCEDURE — 93979 VASCULAR STUDY: CPT | Mod: 26 | Performed by: SURGERY

## 2017-04-18 PROCEDURE — 93922 UPR/L XTREMITY ART 2 LEVELS: CPT | Mod: 26 | Performed by: SURGERY

## 2017-04-18 PROCEDURE — 93922 UPR/L XTREMITY ART 2 LEVELS: CPT

## 2017-04-18 PROCEDURE — 93979 VASCULAR STUDY: CPT

## 2017-04-24 ENCOUNTER — TELEPHONE (OUTPATIENT)
Dept: VASCULAR LAB | Facility: MEDICAL CENTER | Age: 77
End: 2017-04-24

## 2017-04-24 NOTE — TELEPHONE ENCOUNTER
JENNIFER for pt.  She had stable aortoiliac duplex, LE arterial duplex, and LES imaging earlier this mo.  She is still due for carotid duplex.  Left scheduling phone number.  Will f/u in June as planned.    JOHNNIE Marie  [facility]

## 2017-05-04 ENCOUNTER — TELEPHONE (OUTPATIENT)
Dept: MEDICAL GROUP | Facility: PHYSICIAN GROUP | Age: 77
End: 2017-05-04

## 2017-05-04 NOTE — TELEPHONE ENCOUNTER
Pt called requesting new test strips for her glucose meter. Pt states she uses a one touch ultra meter. If possible pt is requesting a 3 month supply of test strips.

## 2017-05-04 NOTE — TELEPHONE ENCOUNTER
Requested Prescriptions     Signed Prescriptions Disp Refills   • Blood Glucose Monitoring Suppl SUPPLIES Misc 100 Each 3     Sig: Test strips order: Test strips for one touch ultra meter.Sig: use qd and prn ssx high or low sugar #100 RF x 3 DX: E11     Authorizing Provider: CLAYTON LAYTON     RX sent to pharmacy.  LOREN Cao.

## 2017-05-09 ENCOUNTER — HOSPITAL ENCOUNTER (OUTPATIENT)
Dept: RADIOLOGY | Facility: MEDICAL CENTER | Age: 77
End: 2017-05-09
Attending: NURSE PRACTITIONER
Payer: MEDICARE

## 2017-05-09 DIAGNOSIS — I65.22 LEFT CAROTID ARTERY STENOSIS: ICD-10-CM

## 2017-05-09 PROCEDURE — 93880 EXTRACRANIAL BILAT STUDY: CPT

## 2017-05-09 PROCEDURE — 93880 EXTRACRANIAL BILAT STUDY: CPT | Mod: 26 | Performed by: SURGERY

## 2017-05-18 RX ORDER — ARIPIPRAZOLE 5 MG/1
TABLET ORAL
Qty: 90 TAB | Refills: 0 | Status: SHIPPED | OUTPATIENT
Start: 2017-05-18 | End: 2017-08-31 | Stop reason: SDUPTHER

## 2017-06-13 RX ORDER — DULOXETIN HYDROCHLORIDE 60 MG/1
CAPSULE, DELAYED RELEASE ORAL
Qty: 180 CAP | Refills: 1 | Status: SHIPPED | OUTPATIENT
Start: 2017-06-13 | End: 2017-12-25 | Stop reason: SDUPTHER

## 2017-06-14 ENCOUNTER — PATIENT OUTREACH (OUTPATIENT)
Dept: HEALTH INFORMATION MANAGEMENT | Facility: OTHER | Age: 77
End: 2017-06-14

## 2017-06-16 NOTE — PROGRESS NOTES
Attempt #:INCOMING    WebIZ Checked & Epic Updated: yes  HealthConnect Verified: no  Verify PCP: yes    Communication Preference Obtained: yes     Review Care Team: yes    Annual Wellness Visit Scheduling  1. Scheduling Status:Scheduled       Health Maintenance Due   Topic Date Due   • Annual Wellness Visit  SCHEDULED   • RETINAL SCREENING  PT TO F/U WITH EYE DOCTOR   • DIABETES MONOFILAMENT / LE EXAM  SCHEDULED   • IMM PNEUMOCOCCAL 65+ (ADULT) LOW/MEDIUM RISK SERIES (2 of 2 - PPSV23) PT TO F/U WITH PHARM         MyChart Activation: declined  MyChart Avinash: no  Virtual Visits: no  Opt In to Text Messages: no

## 2017-06-29 ENCOUNTER — TELEPHONE (OUTPATIENT)
Dept: VASCULAR LAB | Facility: MEDICAL CENTER | Age: 77
End: 2017-06-29

## 2017-06-29 NOTE — TELEPHONE ENCOUNTER
Reviewed records from Dr. Bernabe who is recommending continued medical management and surveillance for carotid disease. We will defer to that recommendation. Follow-up as scheduled    Michael J. Bloch, MD  [facility]

## 2017-07-12 ENCOUNTER — OFFICE VISIT (OUTPATIENT)
Dept: BEHAVIORAL HEALTH | Facility: PHYSICIAN GROUP | Age: 77
End: 2017-07-12
Payer: MEDICARE

## 2017-07-12 VITALS
DIASTOLIC BLOOD PRESSURE: 70 MMHG | BODY MASS INDEX: 25.46 KG/M2 | RESPIRATION RATE: 16 BRPM | TEMPERATURE: 98.1 F | WEIGHT: 110 LBS | SYSTOLIC BLOOD PRESSURE: 122 MMHG | HEART RATE: 88 BPM | HEIGHT: 55 IN

## 2017-07-12 DIAGNOSIS — F31.31 MILD DEPRESSED BIPOLAR 1 DISORDER (HCC): ICD-10-CM

## 2017-07-12 DIAGNOSIS — G89.4 CHRONIC PAIN SYNDROME: ICD-10-CM

## 2017-07-12 PROCEDURE — 99213 OFFICE O/P EST LOW 20 MIN: CPT | Performed by: PSYCHIATRY & NEUROLOGY

## 2017-07-12 PROCEDURE — 90833 PSYTX W PT W E/M 30 MIN: CPT | Performed by: PSYCHIATRY & NEUROLOGY

## 2017-07-12 NOTE — PROGRESS NOTES
"[facility]  PSYCHIATRIC FOLLOW-UP NOTE    Name: Yuliet Hammond  MRN: [id number]  : 1940  Age: 77 y.o.  Date of assessment: 2017  PCP: SIOBHAN Cao  Persons in attendance: Patient  REASON FOR VISIT/CHIEF COMPLAINT (as stated by Patient):  Yuliet Hammond is a 77 y.o., White female, attending follow-up appointment for   Chief Complaint   Patient presents with   • Medication Management     The patient is seen today for follow up on her bipolar disorder and she fell dwon two days ago.   .      HISTORY OF PRESENT ILLNESS:    This is a 78 yo female, lives with her grand daughter, seen today for follow up. The patient had a fall two days ago but she did not brake any bones. The patient fell to the left side when she tried to pick some thing up. The patient is taking lamotrigine 200 mg twice a day and that is helping her with depression. The patient stopped lorazepam  And she is on abilify 5 mg per day with two duloxetine.    PSYCHOSOCIAL CHANGES SINCE PREVIOUS CONTACT:  The patient denied depression and hypomania.     RESPONSE TO TREATMENT:  Good.    MEDICATION SIDE EFFECTS:  Denied.    REVIEW OF SYSTEMS:        Constitutional positive - risk of falls.   Eyes negative   Ears/Nose/Mouth/Throat negative   Cardiovascular positive - hypertension, coronary artery disease, peripheral arterial disease   Respiratory positive - history of lung cancer, COPD   Gastrointestinal negative   Genitourinary negative   Muscular negative   Integumentary negative   Neurological negative   Endocrine positive - hyperlipidemia, diabetes, hypohtyroidism   Hematologic/Lymphatic negative       PSYCHIATRIC EXAMINATION/MENTAL STATUS  /70 mmHg  Pulse 88  Temp(Src) 36.7 °C (98.1 °F)  Resp 16  Ht 1.334 m (4' 4.52\")  Wt 49.896 kg (110 lb)  BMI 28.04 kg/m2  Participation: Active verbal participation and Attentive  Grooming:Casual  Orientation: Alert and Fully Oriented  Eye contact: Good  Behavior:Calm "   Mood: Anxious  Affect: Flexible and Full range  Thought process: Logical and Goal-directed  Thought content:  Within normal limits  Speech: Rate within normal limits and Volume within normal limits  Perception:  Within normal limits  Memory:  Poor memory for chronology of events  Insight: Good  Judgment: Good  Family/couple interaction observations:   Other:    Current risk:    Suicide: Low   Homicide: Low   Self-harm: Low  Relapse: Low  Other:   Crisis Safety Plan reviewed?Yes  If evidence of imminent risk is present, intervention/plan:    Medical Records/Labs/Diagnostic Tests Reviewed: yes.    Medical Records/Labs/Diagnostic Tests Ordered: none.    DIAGNOSTIC IMPRESSION(S):  1. Mild depressed bipolar 1 disorder (CMS-HCC)    2. Chronic pain syndrome           ASSESSMENT AND PLAN:    1. Bipolar disorder, depressed.  Lamotrigine 200 mg one twice a day  Duloxetine 60 mg BID  Aripiprazole 5 mg per day    2. Chronic pain syndrome.  Going through pain management.      3. Follow up in three months.    4. Pharmacy will contact for refills.       16 minutes were spent in psychotherapy.  (If greater than 16 minutes, add [id number] code)   Topics addressed in psychotherapy include:   The patient has good family support.  The patient agreed to do good breathing exercise and meditation.  The patient agreed to keep a daily routine  Taught her good stress management.  Exercise only as tolerated.   Jean Marie Solis M.D.

## 2017-07-13 ENCOUNTER — OFFICE VISIT (OUTPATIENT)
Dept: MEDICAL GROUP | Facility: PHYSICIAN GROUP | Age: 77
End: 2017-07-13
Payer: MEDICARE

## 2017-07-13 VITALS
RESPIRATION RATE: 16 BRPM | HEART RATE: 94 BPM | OXYGEN SATURATION: 93 % | SYSTOLIC BLOOD PRESSURE: 124 MMHG | DIASTOLIC BLOOD PRESSURE: 66 MMHG | TEMPERATURE: 98.1 F | HEIGHT: 55 IN | BODY MASS INDEX: 24.99 KG/M2 | WEIGHT: 108 LBS

## 2017-07-13 DIAGNOSIS — Z63.9 FAMILY RELATIONSHIP PROBLEM: ICD-10-CM

## 2017-07-13 DIAGNOSIS — F33.2 SEVERE EPISODE OF RECURRENT MAJOR DEPRESSIVE DISORDER, WITHOUT PSYCHOTIC FEATURES (HCC): ICD-10-CM

## 2017-07-13 DIAGNOSIS — E11.8 CONTROLLED TYPE 2 DIABETES MELLITUS WITH COMPLICATION, WITHOUT LONG-TERM CURRENT USE OF INSULIN (HCC): ICD-10-CM

## 2017-07-13 DIAGNOSIS — I15.2 HYPERTENSION ASSOCIATED WITH DIABETES (HCC): ICD-10-CM

## 2017-07-13 DIAGNOSIS — Z91.81 RISK FOR FALLS: ICD-10-CM

## 2017-07-13 DIAGNOSIS — E89.0 HYPOTHYROIDISM, POSTRADIOIODINE THERAPY: ICD-10-CM

## 2017-07-13 DIAGNOSIS — E11.59 HYPERTENSION ASSOCIATED WITH DIABETES (HCC): ICD-10-CM

## 2017-07-13 DIAGNOSIS — E78.5 HYPERLIPIDEMIA ASSOCIATED WITH TYPE 2 DIABETES MELLITUS (HCC): ICD-10-CM

## 2017-07-13 DIAGNOSIS — E11.69 HYPERLIPIDEMIA ASSOCIATED WITH TYPE 2 DIABETES MELLITUS (HCC): ICD-10-CM

## 2017-07-13 PROCEDURE — 99214 OFFICE O/P EST MOD 30 MIN: CPT | Performed by: NURSE PRACTITIONER

## 2017-07-13 SDOH — SOCIAL STABILITY - SOCIAL INSECURITY: PROBLEM RELATED TO PRIMARY SUPPORT GROUP, UNSPECIFIED: Z63.9

## 2017-07-13 NOTE — MR AVS SNAPSHOT
"Mia Hammond   2017 3:55 PM   Office Visit   MRN: [id number]    Department:  [facility]   Dept Phone:  [phone]    Description:  Female : 1940   Provider:  SIOBHAN Cao           Reason for Visit     Chronic Care Management follow up     Diabetes           Allergies as of 2017     Allergen Noted Reactions    Codeine 2010   Vomiting    Sulfa Drugs 2010   Hives    Tape 2015       Paper ok      You were diagnosed with     Hypothyroidism, postradioiodine therapy   [235578]       Severe episode of recurrent major depressive disorder, without psychotic features (CMS-HCC)   [5561043]       Risk for falls   [176888]         Vital Signs     Blood Pressure Pulse Temperature Respirations Height Weight    124/66 mmHg 94 36.7 °C (98.1 °F) 16 1.334 m (4' 4.52\") 48.988 kg (108 lb)    Body Mass Index Oxygen Saturation Smoking Status             27.53 kg/m2 93% Current Every Day Smoker         Basic Information     Date Of Birth Sex Race Ethnicity Preferred Language    1940 Female White Non- English      Your appointments     2017  3:40 PM   Follow Up Visit with VASCULAR NURSE PRACTITIONER   [facility]  (--)    [address]  [place] [address]   [phone]            2017  4:20 PM   ANNUAL EXAM PREVENTATIVE with SIOBHAN Nash   [facility])    [address] [place] [address], [address]  [place] [id number] [phone]            Oct 12, 2017  4:30 PM   Follow Up Med Management with Jean Marie Solis M.D.   [facility])    [address]  [address]  [place] [address]   [phone]            Nov 15, 2017  4:15 PM   Established Patient with SIOBHAN Cao   [facility])    [address] [place] [address], [address]  [place] [id number] [phone]           You will be " receiving a confirmation call a few days before your appointment from our automated call confirmation system.              Problem List              ICD-10-CM Priority Class Noted - Resolved    Severe episode of recurrent major depressive disorder, without psychotic features (CMS-HCC) F33.2   7/27/2010 - Present    Hyperlipidemia associated with type 2 diabetes mellitus (CMS-HCC) E11.69, E78.5   7/27/2010 - Present    Hypertension associated with diabetes (CMS-HCC) E11.59, I10   7/27/2010 - Present    Diabetes mellitus type 2, controlled (CMS-HCC) E11.9   7/27/2010 - Present    History of lung cancer Z85.118   7/27/2010 - Present    CAD (coronary artery disease) I25.10   7/27/2010 - Present    S/P CABG (coronary artery bypass graft) Z95.1   7/27/2010 - Present    Peripheral arterial disease (CMS-HCC) I73.9   7/27/2010 - Present    Carotid bruit R09.89   7/27/2010 - Present    Abdominal bruit R09.89   7/27/2010 - Present    Intermittent claudication (CMS-HCC) I73.9   10/12/2010 - Present    Nicotine dependence, cigarettes, uncomplicated F17.210   1/17/2011 - Present    COPD (chronic obstructive pulmonary disease) (CMS-HCC) J44.9   2/2/2011 - Present    Blue toes R23.0   2/2/2011 - Present    Chronic pain G89.29   7/26/2015 - Present    Osteopenia M85.80   8/23/2015 - Present    Risk for falls Z91.81   8/23/2015 - Present    Intertrochanteric fracture of left femur (CMS-HCC) S72.142A   9/27/2015 - Present    Left carotid artery stenosis I65.22   12/16/2015 - Present    Iron deficiency anemia D50.9   10/27/2016 - Present    Hypothyroidism, postradioiodine therapy E89.0   10/27/2016 - Present      Health Maintenance        Date Due Completion Dates    RETINAL SCREENING 8/27/2016 8/27/2015    DIABETES MONOFILAMENT / LE EXAM 11/24/2016 11/24/2015 (Declined)    Override on 11/24/2015: Patient Declined (has upcoming appt with podiatry.)    IMM PNEUMOCOCCAL 65+ (ADULT) LOW/MEDIUM RISK SERIES (2 of 2 - PPSV23) 12/17/2016  12/17/2015    MAMMOGRAM 8/7/2017 8/7/2015    IMM INFLUENZA (1) 9/1/2017 10/27/2016, 11/23/2015    SERUM CREATININE 10/7/2017 10/7/2016, 3/30/2016, 1/11/2016, 12/11/2015, 7/27/2015, 1/14/2011, 8/3/2010    A1C SCREENING 10/10/2017 4/10/2017, 10/7/2016, 3/30/2016, 7/27/2015, 1/14/2011, 8/3/2010    FASTING LIPID PROFILE 3/16/2018 3/16/2017, 10/7/2016, 3/30/2016, 1/11/2016, 7/27/2015, 1/14/2011, 8/3/2010    URINE ACR / MICROALBUMIN 4/10/2018 4/10/2017, 3/30/2016, 7/27/2015    BONE DENSITY 8/7/2020 8/7/2015    COLONOSCOPY 10/1/2020 10/1/2015    IMM DTaP/Tdap/Td Vaccine (3 - Td) 3/20/2025 3/20/2015, 1/24/2012            Current Immunizations     13-VALENT PCV PREVNAR 12/17/2015  6:09 AM    Influenza Vaccine Adult HD 10/27/2016    Influenza Vaccine Quad Inj (Preserved) 11/23/2015    SHINGLES VACCINE 6/1/2013    Tdap Vaccine 3/20/2015, 1/24/2012      Below and/or attached are the medications your provider expects you to take. Review all of your home medications and newly ordered medications with your provider and/or pharmacist. Follow medication instructions as directed by your provider and/or pharmacist. Please keep your medication list with you and share with your provider. Update the information when medications are discontinued, doses are changed, or new medications (including over-the-counter products) are added; and carry medication information at all times in the event of emergency situations     Allergies:  CODEINE - Vomiting     SULFA DRUGS - Hives     TAPE - (reactions not documented)               Medications  Valid as of: July 13, 2017 -  4:49 PM    Generic Name Brand Name Tablet Size Instructions for use    Albuterol Sulfate (Aero Soln) albuterol 108 (90 BASE) MCG/ACT Inhale 1-2 Puffs by mouth every 6 hours as needed. Give albuterol that is patient or insurance preference please        ARIPiprazole (Tab) ABILIFY 5 MG TAKE 1 TABLET BY MOUTH ONCE DAILY IN THE MORNING        Aspirin (Tab) aspirin 81 MG Take 81 mg  by mouth every day.        Baclofen (Tab) LIORESAL 10 MG Take 10 mg by mouth 2 times a day.        Blood Glucose Monitoring Suppl (Misc) Blood Glucose Monitoring Suppl SUPPLIES Test strips order: Test strips for one touch ultra meter.Sig: use qd and prn ssx high or low sugar #100 RF x 3 DX: E11        Cilostazol (Tab) PLETAL 100 MG TAKE 1 TABLET BY MOUTH TWICE DAILY(LEG PAIN)        Diclofenac Sodium (Gel) Diclofenac Sodium 1 % Apply thin layer to affected area tid, prn.        DULoxetine HCl (Cap DR Particles) CYMBALTA 60 MG TAKE TWO CAPSULES BY MOUTH ONCE AT BEDTIME(GENERALIZED ANXIETY DISORDER, MAJOR DEPRESSIVE DISORDER)        Fluticasone Propionate (Suspension) FLONASE 50 MCG/ACT SHAKE LIQUID AND USE 1 SPRAY IN EACH NOSTRIL TWICE DAILY        Furosemide (Tab) LASIX 20 MG TAKE 1-2 TABLET BY MOUTH DAILY PRN for edema        LamoTRIgine (Tab) LAMICTAL 100 MG TAKE 3 TABLETS BY MOUTH ONCE DAILY AT BEDTIME FOR DEPRESSION        LamoTRIgine (Tab) LAMICTAL 200 MG Take 1 Tab by mouth 2 times a day.        Levothyroxine Sodium (Tab) SYNTHROID 75 MCG TAKE 1 TABLET BY MOUTH ONCE DAILY IN THE MORNING ON AN EMPTY STOMACH        LORazepam (Tab) ATIVAN 0.5 MG TAKE 1 TABLET BY MOUTH EVERY 12 HOURS AS NEEDED FOR ANXIETY        Losartan Potassium-HCTZ (Tab) HYZAAR 50-12.5 MG TAKE 1 TABLET BY MOUTH EVERY DAY        MetFORMIN HCl (Tab) GLUCOPHAGE 1000 MG TAKE 1 TABLET BY MOUTH TWICE DAILY        Mupirocin Calcium (Cream) BACTROBAN 2 % Apply small amount to affected area bid for up to 10 days.        Oxycodone-Acetaminophen (Tab) PERCOCET-10  MG Take 1 Tab by mouth every four hours as needed for Severe Pain.        Potassium Chloride (Tab CR) KLOR-CON 8 MEQ TAKE 1 TABLET BY MOUTH ONCE DAILY        Rosuvastatin Calcium (Tab) CRESTOR 10 MG Take 1 Tab by mouth every evening.        Sennosides-Docusate Sodium (Tab) PERICOLACE or SENOKOT S 8.6-50 MG Take 1 Tab by mouth every day.        Tiotropium Bromide Monohydrate (Cap) SPIRIVA  HANDIHALER 18 MCG INHALE THE CONTENTS OF 1 CAPSULE BY MOUTH VIA HANDIHALER ONCE DAILY        .                 Medicines prescribed today were sent to:     [facility] [address] [place] [address] [place], [place] - [address] AT [facility] [address] [address] [place] [address]    Phone: [phone] Fax: [phone]    Open 24 Hours?: No      Medication refill instructions:       If your prescription bottle indicates you have medication refills left, it is not necessary to call your provider’s office. Please contact your pharmacy and they will refill your medication.    If your prescription bottle indicates you do not have any refills left, you may request refills at any time through one of the following ways: The online [facility] system (except Urgent Care), by calling your provider’s office, or by asking your pharmacy to contact your provider’s office with a refill request. Medication refills are processed only during regular business hours and may not be available until the next business day. Your provider may request additional information or to have a follow-up visit with you prior to refilling your medication.   *Please Note: Medication refills are assigned a new Rx number when refilled electronically. Your pharmacy may indicate that no refills were authorized even though a new prescription for the same medication is available at the pharmacy. Please request the medicine by name with the pharmacy before contacting your provider for a refill.           MyChart Status: Patient Declined        Quit Tobacco Information     Do you want to quit using tobacco?    Quitting tobacco decreases risks of cancer, heart and lung disease, increases life expectancy, improves sense of taste and smell, and increases spending money, among other benefits.    If you are thinking about quitting, we can help.  • [facility] Quit Tobacco Program: [phone]  o Program occurs weekly for four weeks and includes pharmacist consultation on  products to support quitting smoking or chewing tobacco. A provider referral is needed for pharmacist consultation.  • Tobacco Users Help Hotline: 6-800QUIT-NOW (344-6513) or https://[place].quitlogix.org/  o Free, confidential telephone and online coaching for [place] residents. Sessions are designed on a schedule that is convenient for you. Eligible clients receive free nicotine replacement therapy.  • Nationally: www.smokefree.gov  o Information and professional assistance to support both immediate and long-term needs as you become, and remain, a non-smoker. Smokefree.gov allows you to choose the help that best fits your needs.

## 2017-07-13 NOTE — ASSESSMENT & PLAN NOTE
Did have fall a few days ago, walker slipped. No significant injury. [facility] [place] ordering physical therapy.
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
## 2017-07-16 NOTE — ASSESSMENT & PLAN NOTE
She continues metformin twice a day. She is also on an ACE inhibitor and statin medication. Previous A1c 6.7, most recent 6.5.

## 2017-07-17 ENCOUNTER — OFFICE VISIT (OUTPATIENT)
Dept: VASCULAR LAB | Facility: MEDICAL CENTER | Age: 77
End: 2017-07-17
Attending: INTERNAL MEDICINE
Payer: MEDICARE

## 2017-07-17 VITALS
WEIGHT: 108 LBS | SYSTOLIC BLOOD PRESSURE: 118 MMHG | DIASTOLIC BLOOD PRESSURE: 51 MMHG | HEIGHT: 55 IN | BODY MASS INDEX: 24.99 KG/M2 | HEART RATE: 100 BPM

## 2017-07-17 DIAGNOSIS — I65.22 LEFT CAROTID ARTERY STENOSIS: ICD-10-CM

## 2017-07-17 DIAGNOSIS — I25.83 CORONARY ARTERY DISEASE DUE TO LIPID RICH PLAQUE: ICD-10-CM

## 2017-07-17 DIAGNOSIS — I73.9 INTERMITTENT CLAUDICATION (HCC): ICD-10-CM

## 2017-07-17 DIAGNOSIS — E11.69 HYPERLIPIDEMIA ASSOCIATED WITH TYPE 2 DIABETES MELLITUS (HCC): ICD-10-CM

## 2017-07-17 DIAGNOSIS — I25.10 CORONARY ARTERY DISEASE DUE TO LIPID RICH PLAQUE: ICD-10-CM

## 2017-07-17 DIAGNOSIS — I73.9 PERIPHERAL ARTERIAL DISEASE (HCC): ICD-10-CM

## 2017-07-17 DIAGNOSIS — R23.0 BLUE TOES: ICD-10-CM

## 2017-07-17 DIAGNOSIS — E78.5 HYPERLIPIDEMIA ASSOCIATED WITH TYPE 2 DIABETES MELLITUS (HCC): ICD-10-CM

## 2017-07-17 PROCEDURE — 99213 OFFICE O/P EST LOW 20 MIN: CPT | Performed by: NURSE PRACTITIONER

## 2017-07-17 PROCEDURE — 99212 OFFICE O/P EST SF 10 MIN: CPT | Performed by: NURSE PRACTITIONER

## 2017-07-17 ASSESSMENT — ENCOUNTER SYMPTOMS
DEPRESSION: 0
BACK PAIN: 1
FOCAL WEAKNESS: 0
LOSS OF CONSCIOUSNESS: 0
COUGH: 0
CLAUDICATION: 0
PALPITATIONS: 0
MYALGIAS: 0
BLURRED VISION: 0
NECK PAIN: 1
HEADACHES: 0
FALLS: 1
SHORTNESS OF BREATH: 0
WEAKNESS: 1
SEIZURES: 0
BLOOD IN STOOL: 0
WEIGHT LOSS: 0
BRUISES/BLEEDS EASILY: 1

## 2017-07-17 NOTE — MR AVS SNAPSHOT
"Mia Hammond   2017 3:40 PM   Office Visit   MRN: [id number]    Department:  Vascular Medicine   Dept Phone:  [phone]    Description:  Female : 1940   Provider:  RAHUL Lrason           Reason for Visit     Follow-Up           Allergies as of 2017     Allergen Noted Reactions    Codeine 2010   Vomiting    Sulfa Drugs 2010   Hives    Tape 2015       Paper ok      Vital Signs     Blood Pressure Pulse Height Weight Body Mass Index Smoking Status    118/51 mmHg 100 1.334 m (4' 4.52\") 48.988 kg (108 lb) 27.53 kg/m2 Current Every Day Smoker      Basic Information     Date Of Birth Sex Race Ethnicity Preferred Language    1940 Female White Non- English      Your appointments     2017  4:20 PM   ANNUAL EXAM PREVENTATIVE with SIOBHAN Nash   [facility])    [address] [place] [address], [address]  [facility] [address] [phone]            Oct 12, 2017  4:30 PM   Follow Up Med Management with Jean Marie Solis M.D.   [facility] [address] [facility])    [address] [facility]  [address]  [facility] [address] [phone]            Nov 15, 2017  4:15 PM   Established Patient with SIOBHAN Cao   [facility])    [address] [place] [address], [address]  [facility] [address] [phone]           You will be receiving a confirmation call a few days before your appointment from our automated call confirmation system.            2018  3:40 PM   Follow Up Visit with VASCULAR NURSE PRACTITIONER   [facility]  (--)    [address] [facility] [address] [phone]              Problem List              ICD-10-CM Priority Class Noted - Resolved    Severe episode of recurrent major depressive disorder, without psychotic features (CMS-HCC) F33.2   2010 - Present    Hyperlipidemia associated with type 2 " diabetes mellitus (CMS-HCC) E11.69, E78.5   7/27/2010 - Present    Hypertension associated with diabetes (CMS-HCC) E11.59, I10   7/27/2010 - Present    Diabetes mellitus type 2, controlled (CMS-HCC) E11.9   7/27/2010 - Present    History of lung cancer Z85.118   7/27/2010 - Present    CAD (coronary artery disease) I25.10   7/27/2010 - Present    S/P CABG (coronary artery bypass graft) Z95.1   7/27/2010 - Present    Peripheral arterial disease (CMS-HCC) I73.9   7/27/2010 - Present    Carotid bruit R09.89   7/27/2010 - Present    Abdominal bruit R09.89   7/27/2010 - Present    Intermittent claudication (CMS-HCC) I73.9   10/12/2010 - Present    Nicotine dependence, cigarettes, uncomplicated F17.210   1/17/2011 - Present    COPD (chronic obstructive pulmonary disease) (CMS-HCC) J44.9   2/2/2011 - Present    Blue toes R23.0   2/2/2011 - Present    Chronic pain G89.29   7/26/2015 - Present    Osteopenia M85.80   8/23/2015 - Present    Risk for falls Z91.81   8/23/2015 - Present    Intertrochanteric fracture of left femur (CMS-HCC) S72.142A   9/27/2015 - Present    Left carotid artery stenosis I65.22   12/16/2015 - Present    Iron deficiency anemia D50.9   10/27/2016 - Present    Hypothyroidism, postradioiodine therapy E89.0   10/27/2016 - Present      Health Maintenance        Date Due Completion Dates    RETINAL SCREENING 8/27/2016 8/27/2015    DIABETES MONOFILAMENT / LE EXAM 11/24/2016 11/24/2015 (Declined)    Override on 11/24/2015: Patient Declined (has upcoming appt with podiatry.)    IMM PNEUMOCOCCAL 65+ (ADULT) LOW/MEDIUM RISK SERIES (2 of 2 - PPSV23) 12/17/2016 12/17/2015    MAMMOGRAM 8/7/2017 8/7/2015    IMM INFLUENZA (1) 9/1/2017 10/27/2016, 11/23/2015    SERUM CREATININE 10/7/2017 10/7/2016, 3/30/2016, 1/11/2016, 12/11/2015, 7/27/2015, 1/14/2011, 8/3/2010    A1C SCREENING 10/10/2017 4/10/2017, 10/7/2016, 3/30/2016, 7/27/2015, 1/14/2011, 8/3/2010    FASTING LIPID PROFILE 3/16/2018 3/16/2017, 10/7/2016,  3/30/2016, 1/11/2016, 7/27/2015, 1/14/2011, 8/3/2010    URINE ACR / MICROALBUMIN 4/10/2018 4/10/2017, 3/30/2016, 7/27/2015    BONE DENSITY 8/7/2020 8/7/2015    COLONOSCOPY 10/1/2020 10/1/2015    IMM DTaP/Tdap/Td Vaccine (3 - Td) 3/20/2025 3/20/2015, 1/24/2012            Current Immunizations     13-VALENT PCV PREVNAR 12/17/2015  6:09 AM    Influenza Vaccine Adult HD 10/27/2016    Influenza Vaccine Quad Inj (Preserved) 11/23/2015    SHINGLES VACCINE 6/1/2013    Tdap Vaccine 3/20/2015, 1/24/2012      Below and/or attached are the medications your provider expects you to take. Review all of your home medications and newly ordered medications with your provider and/or pharmacist. Follow medication instructions as directed by your provider and/or pharmacist. Please keep your medication list with you and share with your provider. Update the information when medications are discontinued, doses are changed, or new medications (including over-the-counter products) are added; and carry medication information at all times in the event of emergency situations     Allergies:  CODEINE - Vomiting     SULFA DRUGS - Hives     TAPE - (reactions not documented)               Medications  Valid as of: July 17, 2017 -  4:16 PM    Generic Name Brand Name Tablet Size Instructions for use    Albuterol Sulfate (Aero Soln) albuterol 108 (90 BASE) MCG/ACT Inhale 1-2 Puffs by mouth every 6 hours as needed. Give albuterol that is patient or insurance preference please        ARIPiprazole (Tab) ABILIFY 5 MG TAKE 1 TABLET BY MOUTH ONCE DAILY IN THE MORNING        Aspirin (Tab) aspirin 81 MG Take 81 mg by mouth every day.        Baclofen (Tab) LIORESAL 10 MG Take 10 mg by mouth 2 times a day.        Blood Glucose Monitoring Suppl (Misc) Blood Glucose Monitoring Suppl SUPPLIES Test strips order: Test strips for one touch ultra meter.Sig: use qd and prn ssx high or low sugar #100 RF x 3 DX: E11        Cilostazol (Tab) PLETAL 100 MG TAKE 1 TABLET BY  MOUTH TWICE DAILY(LEG PAIN)        Diclofenac Sodium (Gel) Diclofenac Sodium 1 % Apply thin layer to affected area tid, prn.        DULoxetine HCl (Cap DR Particles) CYMBALTA 60 MG TAKE TWO CAPSULES BY MOUTH ONCE AT BEDTIME(GENERALIZED ANXIETY DISORDER, MAJOR DEPRESSIVE DISORDER)        Fluticasone Propionate (Suspension) FLONASE 50 MCG/ACT SHAKE LIQUID AND USE 1 SPRAY IN EACH NOSTRIL TWICE DAILY        Furosemide (Tab) LASIX 20 MG TAKE 1-2 TABLET BY MOUTH DAILY PRN for edema        LamoTRIgine (Tab) LAMICTAL 100 MG TAKE 3 TABLETS BY MOUTH ONCE DAILY AT BEDTIME FOR DEPRESSION        LamoTRIgine (Tab) LAMICTAL 200 MG Take 1 Tab by mouth 2 times a day.        Levothyroxine Sodium (Tab) SYNTHROID 75 MCG TAKE 1 TABLET BY MOUTH ONCE DAILY IN THE MORNING ON AN EMPTY STOMACH        LORazepam (Tab) ATIVAN 0.5 MG TAKE 1 TABLET BY MOUTH EVERY 12 HOURS AS NEEDED FOR ANXIETY        Losartan Potassium-HCTZ (Tab) HYZAAR 50-12.5 MG TAKE 1 TABLET BY MOUTH EVERY DAY        MetFORMIN HCl (Tab) GLUCOPHAGE 1000 MG TAKE 1 TABLET BY MOUTH TWICE DAILY        Mupirocin Calcium (Cream) BACTROBAN 2 % Apply small amount to affected area bid for up to 10 days.        Oxycodone-Acetaminophen (Tab) PERCOCET-10  MG Take 1 Tab by mouth every four hours as needed for Severe Pain.        Potassium Chloride (Tab CR) KLOR-CON 8 MEQ TAKE 1 TABLET BY MOUTH ONCE DAILY        Rosuvastatin Calcium (Tab) CRESTOR 10 MG Take 1 Tab by mouth every evening.        Sennosides-Docusate Sodium (Tab) PERICOLACE or SENOKOT S 8.6-50 MG Take 1 Tab by mouth every day.        Tiotropium Bromide Monohydrate (Cap) SPIRIVA HANDIHALER 18 MCG INHALE THE CONTENTS OF 1 CAPSULE BY MOUTH VIA HANDIHALER ONCE DAILY        .                 Medicines prescribed today were sent to:     [facility] [address] [place] [address] [place] - [address] [facility] [employer] & [address]    [address] [address]    Phone: [phone] Fax: [phone]    Open 24 Hours?: No         Medication refill instructions:       If your prescription bottle indicates you have medication refills left, it is not necessary to call your provider’s office. Please contact your pharmacy and they will refill your medication.    If your prescription bottle indicates you do not have any refills left, you may request refills at any time through one of the following ways: The online [employer] system (except Urgent Care), by calling your provider’s office, or by asking your pharmacy to contact your provider’s office with a refill request. Medication refills are processed only during regular business hours and may not be available until the next business day. Your provider may request additional information or to have a follow-up visit with you prior to refilling your medication.   *Please Note: Medication refills are assigned a new Rx number when refilled electronically. Your pharmacy may indicate that no refills were authorized even though a new prescription for the same medication is available at the pharmacy. Please request the medicine by name with the pharmacy before contacting your provider for a refill.           MyChart Status: Patient Declined        Quit Tobacco Information     Do you want to quit using tobacco?    Quitting tobacco decreases risks of cancer, heart and lung disease, increases life expectancy, improves sense of taste and smell, and increases spending money, among other benefits.    If you are thinking about quitting, we can help.  • [facility] Quit Tobacco Program: [phone]  o Program occurs weekly for four weeks and includes pharmacist consultation on products to support quitting smoking or chewing tobacco. A provider referral is needed for pharmacist consultation.  • Tobacco Users Help Hotline: 6-369-QUIT-NOW (455-2456) or https://[place].quitlogix.org/  o Free, confidential telephone and online coaching for [place] residents. Sessions are designed on a schedule that is convenient for you.  Eligible clients receive free nicotine replacement therapy.  • Nationally: www.smokefree.gov  o Information and professional assistance to support both immediate and long-term needs as you become, and remain, a non-smoker. Smokefree.gov allows you to choose the help that best fits your needs.

## 2017-07-17 NOTE — PROGRESS NOTES
"  Follow Up VASCULAR VISIT  Subjective:   Yuliet Hammond is a 75 y.o. female who presents today 7-17-17 for   Chief Complaint   Patient presents with   • Follow-Up     HPI:  Patient here for follow-up of carotid artery disease, coronary artery disease, peripheral arterial disease, dyspnea, diabetes, hypertension, smoking.  Surveillance imaging completed.  No TIA or stroke symptoms  LE painful at times-- elevates as much as possible.  No skin breakdown  Leg swelling unchanged  No CP, SOB.  Taking crestor  BP at home 100s/40s-50s  On losartan hct  Takes furosemide as needed for leg swelling  Taking 1/2 tab of metformin 2x daily  Denies hypoglycemia  On ASA and pletal-- no bleeding.  Painful back from scoliosis.   Continues to smoke 1 ppd - attempting to quit.  Doesn't want to go to classes at this time due to transportation.   Went to lab however no labwork drawn that was ordered by our office.  Gave pt lab requisitions today.    DIET AND EXERCISE:  Weight Change:stable  Diet: common adult  Exercise: doing PT exercises every day    Review of Systems   Constitutional: Positive for malaise/fatigue. Negative for weight loss.   Eyes: Negative for blurred vision.   Respiratory: Negative for cough and shortness of breath.    Cardiovascular: Negative for chest pain, palpitations, claudication and leg swelling.   Gastrointestinal: Negative for blood in stool.   Genitourinary: Negative for hematuria.   Musculoskeletal: Positive for back pain, joint pain, falls and neck pain. Negative for myalgias.   Neurological: Positive for weakness. Negative for focal weakness, seizures, loss of consciousness and headaches.   Endo/Heme/Allergies: Bruises/bleeds easily.   Psychiatric/Behavioral: Negative for depression.      Objective:     Filed Vitals:    07/17/17 1541 07/17/17 1552   BP: 66/45 118/51   Pulse: 101 100   Height: 1.334 m (4' 4.52\")    Weight: 48.988 kg (108 lb)       Body mass index is 27.53 kg/(m^2).  Physical Exam "   Constitutional: She is oriented to person, place, and time. No distress.   Cardiovascular: Normal rate, regular rhythm and normal heart sounds.    No murmur heard.  Decreased but palpable pulses in both LE     Pulmonary/Chest: Effort normal and breath sounds normal. No respiratory distress. She has no wheezes. She has no rales.   Musculoskeletal: She exhibits no edema.   Neurological: She is alert and oriented to person, place, and time. No cranial nerve deficit. Coordination normal.   Skin: Skin is warm and dry. She is not diaphoretic.   Psychiatric: She has a normal mood and affect.   Vitals reviewed.    Lab Results   Component Value Date    CHOLSTRLTOT 120 03/16/2017    LDL 44 03/16/2017    HDL 62 03/16/2017    TRIGLYCERIDE 68 03/16/2017         Lab Results   Component Value Date    HBA1C 6.5 04/10/2017      Lab Results   Component Value Date    SODIUM 131* 10/07/2016    POTASSIUM 3.7 10/07/2016    CHLORIDE 94* 10/07/2016    CO2 26 10/07/2016    GLUCOSE 99 10/07/2016    BUN 16 10/07/2016    CREATININE 0.94 10/07/2016    IFAFRICA >60 10/07/2016    IFNOTAFR 58* 10/07/2016        Lab Results   Component Value Date    WBC 6.3 01/12/2016    RBC 4.09* 01/12/2016    HEMOGLOBIN 12.3 01/12/2016    HEMATOCRIT 38.9 01/12/2016    MCV 95.1 01/12/2016    MCH 30.1 01/12/2016    MCHC 31.6* 01/12/2016    MPV 12.2 01/12/2016      Carotid duplex March 2016  Antegrade flow in both vertebrals. Moderate right internal carotid stenosis. Left carotid endarterectomy site however focal plaque seen at the proximal end of the surgical site appearing to cause a moderate stenosis. There is also some plaque distal to the surgical site which causes some degree of stenosis    CTA Neck: April 2016  Impression        1.  Extensive calcified plaque right carotid artery bifurcation resulting in 70% stenosis at the origin the right internal carotid artery.  2.  Postoperative change left carotid artery. Ulcerated plaque but no significant stenosis  identified.  3.  Marked stenosis at the origin of the left vertebral artery. Vertebral arteries otherwise are patent. No dissection.     LE Arterial Dulex sep 2016:  LES R 1.09 L 1.08   minimal aortic plaquing with no significant stenoses or aneurysmal dilatation  50% left common iliac artery stenosis  Minimal right common iliac stenosis    LE Arterial/LES 4/18/17:  Conclusions   1.  Normal resting bilateral lower extremity perfusion.    4/18/17:   Aorto-Iliac   Duplex Report     Vascular Laboratory   CONCLUSIONS   1.  No evidence of aortic aneurysm or stenosis.    2.  Possible moderate right common iliac artery stenosis, although doppler    angles appear inaccurate.   3.  No other evidence of iliac artery stenoses bilaterally.    Carotid Duplex 5/9/17:  CONCLUSIONS   Moderate stenosis of the right internal carotid (50-69%).    No stenosis left internal carotid post endarterectomy.    Medical Decision Making:  Today's Assessment / Status / Plan:     1. Left carotid artery stenosis     2. Blue toes     3. Intermittent claudication (CMS-HCC)     4. Hyperlipidemia associated with type 2 diabetes mellitus (CMS-HCC)     5. Coronary artery disease due to lipid rich plaque     6. Peripheral arterial disease (CMS-HCC)       Patient Type: Secondary Prevention    Etiology of Established CVD if Present:   1. carotid disease status post carotid endarterectomy in 2016  2. peripheral arterial disease status post right iliac stent   3. coronary artery disease status post CABG currently without angina    Lipid Management: Qualifies for Statin Therapy Based on 2013 ACC/AHA Guidelines: yes  Calculated 10-Year Risk of ASCVD: N/A  Currently on Statin: Yes  Lp(a) normal  Indication for at least moderate intensity statin  Has been intermittently adherent in past  Now with good control but on zetia monotherapy (stopped crestor for unclear reasons)  Plan:  - Restart crestor 10 mg daily - take every day  - Hold zetia for now-- insurance  didn't cover.  - Continue lifestyle modification  - Recheck fasting lipids prior to next visit  - Consider intensification of therapy if above goal    Blood Pressure Management:Goal: JNC8 (2013) Office BP Goal:<140/90; Under Control: yes  Blood pressure under excellent control both at home and in the office  Plan:  - Continue current hypertensive medications  - Continue home blood pressure monitoring  - Recheck GFR electrolytes prior to next visit    Glycemic Status: Diabetic  A1c under excellent control  Plan:  - Continue 1/2 tab metformin to tablet twice daily  - Recheck A1c prior to next visit  - Recheck urine for albumin  - Recommended yearly flu shot    Anti-Platelet/Anti-Coagulant Tx: yes  - Continue aspirin and cilostazol    Smoking: Recommended complete smoking cessation  Not a good candidate for Chantix or Wellbutrin  Willing to retry patch-- gave her hives from adhesive.  Not willing to go to smoking cessation classes at this time  Plan:  - Pick quit date  - Lozenge or gum as needed  - Begin to cut back # of cigarettes per day.     Physical Activity: As much walking as possible    Weight Management and Nutrition: Dietary plan was discussed with patient at this visit including better diabetic diet    Other:     1.  carotid disease status post recent carotid endarterectomy with some evidence of residual stenosis on carotid duplex and at least moderate contralateral stenosis  - Continue medical management as above  - Continue with yearly surveillance     2. peripheral arterial disease status post right iliac stent without lifestyle limiting claudication at present. Recent angiogram showed no significant target of intervention. No evidence of critical limb ischemia. She does have evidence of small vessel disease and perhaps some vasospasm.   - Continue medical management as above.   - Continue with noninvasive surveillance.    - Call immediately if she begins to develop any skin breakdown or rest pain (patient  verbalized understanding)    3. coronary artery disease status post CABG currently without angina - continue medical management as above.  Check BNP to r/p heart failure given leg swelling.  Consider repeat echo if elevated. Wait for results.     4. Hypothyroidism -appears under good control. Defer management to PCP    5. Hip pain - defer management to orthopedics and PCP    6. Affective disorder - defer management to PCP    Instructed to follow-up with PCP for remainder of adult medical needs: yes  We will partner with other providers in the management of established vascular disease and cardiometabolic risk factors.    Studies to Be Obtained:   1. Carotid duplex- April 2018  2.  aortoiliac duplex, lower extremity arterial duplex, and LES - in april 2018    Labs to Be Obtained: As ordered previously- requisites given.     Follow up in: 6 months per pt request     NOE Larson.    CC:  MD Feliciaon Wise MD John Hansen, MD

## 2017-07-24 NOTE — TELEPHONE ENCOUNTER
Patient has recently been seen by PCP within the last 6 months per protocol (7/17). Will refill medications for 6 months.  Lab Results   Component Value Date/Time    GLYCOHEMOGLOBIN 6.5 04/10/2017 05:40 PM      Lab Results   Component Value Date/Time    MICRO ALB CREAT RATIO 28 04/10/2017 07:36 PM    MICROALBUMIN, URINE RANDOM 4.5 04/10/2017 07:36 PM      Lab Results   Component Value Date/Time    ALKALINE PHOSPHATASE 89 10/07/2016 10:48 AM    AST(SGOT) 20 10/07/2016 10:48 AM    ALT(SGPT) 14 10/07/2016 10:48 AM    TOTAL BILIRUBIN 0.5 10/07/2016 10:48 AM

## 2017-07-26 ENCOUNTER — OFFICE VISIT (OUTPATIENT)
Dept: MEDICAL GROUP | Facility: PHYSICIAN GROUP | Age: 77
End: 2017-07-26
Payer: MEDICARE

## 2017-07-26 VITALS
TEMPERATURE: 97.3 F | OXYGEN SATURATION: 97 % | DIASTOLIC BLOOD PRESSURE: 68 MMHG | HEIGHT: 55 IN | BODY MASS INDEX: 24.99 KG/M2 | WEIGHT: 108 LBS | SYSTOLIC BLOOD PRESSURE: 98 MMHG | HEART RATE: 108 BPM

## 2017-07-26 DIAGNOSIS — R09.89 BRUIT OF LEFT CAROTID ARTERY: ICD-10-CM

## 2017-07-26 DIAGNOSIS — Z85.118 HISTORY OF LUNG CANCER: ICD-10-CM

## 2017-07-26 DIAGNOSIS — F33.2 SEVERE EPISODE OF RECURRENT MAJOR DEPRESSIVE DISORDER, WITHOUT PSYCHOTIC FEATURES (HCC): ICD-10-CM

## 2017-07-26 DIAGNOSIS — E78.5 HYPERLIPIDEMIA ASSOCIATED WITH TYPE 2 DIABETES MELLITUS (HCC): ICD-10-CM

## 2017-07-26 DIAGNOSIS — F17.210 NICOTINE DEPENDENCE, CIGARETTES, UNCOMPLICATED: ICD-10-CM

## 2017-07-26 DIAGNOSIS — I73.9 PERIPHERAL ARTERIAL DISEASE (HCC): ICD-10-CM

## 2017-07-26 DIAGNOSIS — I15.2 HYPERTENSION ASSOCIATED WITH DIABETES (HCC): ICD-10-CM

## 2017-07-26 DIAGNOSIS — Z00.00 MEDICARE ANNUAL WELLNESS VISIT, INITIAL: Primary | ICD-10-CM

## 2017-07-26 DIAGNOSIS — E11.59 HYPERTENSION ASSOCIATED WITH DIABETES (HCC): ICD-10-CM

## 2017-07-26 DIAGNOSIS — I73.9 INTERMITTENT CLAUDICATION (HCC): ICD-10-CM

## 2017-07-26 DIAGNOSIS — Z91.81 RISK FOR FALLS: ICD-10-CM

## 2017-07-26 DIAGNOSIS — J42 CHRONIC BRONCHITIS, UNSPECIFIED CHRONIC BRONCHITIS TYPE (HCC): ICD-10-CM

## 2017-07-26 DIAGNOSIS — R09.89 ABDOMINAL BRUIT: ICD-10-CM

## 2017-07-26 DIAGNOSIS — Z95.1 S/P CABG (CORONARY ARTERY BYPASS GRAFT): ICD-10-CM

## 2017-07-26 DIAGNOSIS — E89.0 HYPOTHYROIDISM, POSTRADIOIODINE THERAPY: ICD-10-CM

## 2017-07-26 DIAGNOSIS — E11.69 HYPERLIPIDEMIA ASSOCIATED WITH TYPE 2 DIABETES MELLITUS (HCC): ICD-10-CM

## 2017-07-26 DIAGNOSIS — I65.22 LEFT CAROTID ARTERY STENOSIS: ICD-10-CM

## 2017-07-26 DIAGNOSIS — R23.0 BLUE TOES: ICD-10-CM

## 2017-07-26 PROCEDURE — G0438 PPPS, INITIAL VISIT: HCPCS | Performed by: NURSE PRACTITIONER

## 2017-07-26 RX ORDER — FUROSEMIDE 40 MG/1
40 TABLET ORAL DAILY
COMMUNITY
End: 2017-08-22 | Stop reason: SDUPTHER

## 2017-07-26 ASSESSMENT — PATIENT HEALTH QUESTIONNAIRE - PHQ9: CLINICAL INTERPRETATION OF PHQ2 SCORE: 0

## 2017-07-26 NOTE — ASSESSMENT & PLAN NOTE
Chronic condition managed with current medical regimen  Stable per review   Continue with current meds  Followed by mental health q 3 months

## 2017-07-26 NOTE — PROGRESS NOTES
CC:   Medicare Annual Wellness Visit    HPI:  Mia is a 77 y.o. here for Medicare Annual Wellness Visit    Patient Active Problem List    Diagnosis Date Noted   • Iron deficiency anemia 10/27/2016   • Hypothyroidism, postradioiodine therapy 10/27/2016   • Left carotid artery stenosis 12/16/2015   • Intertrochanteric fracture of left femur (CMS-HCC) 09/27/2015   • Osteopenia 08/23/2015   • Risk for falls 08/23/2015   • Chronic pain 07/26/2015   • COPD (chronic obstructive pulmonary disease) (CMS-HCC) 02/02/2011   • Blue toes 02/02/2011   • Nicotine dependence, cigarettes, uncomplicated 01/17/2011   • Intermittent claudication (CMS-HCC) 10/12/2010   • Severe episode of recurrent major depressive disorder, without psychotic features (CMS-HCC) 07/27/2010   • Hyperlipidemia associated with type 2 diabetes mellitus (CMS-HCC) 07/27/2010   • Hypertension associated with diabetes (CMS-HCC) 07/27/2010   • Diabetes mellitus type 2, controlled (CMS-HCC) 07/27/2010   • History of lung cancer 07/27/2010   • CAD (coronary artery disease) 07/27/2010   • S/P CABG (coronary artery bypass graft) 07/27/2010   • Peripheral arterial disease (CMS-HCC) 07/27/2010   • Abdominal bruit 07/27/2010     Current Outpatient Prescriptions   Medication Sig Dispense Refill   • furosemide (LASIX) 40 MG Tab Take 40 mg by mouth every day.     • metformin (GLUCOPHAGE) 500 MG Tab Take 500 mg by mouth 2 times a day, with meals.     • duloxetine (CYMBALTA) 60 MG Cap DR Particles delayed-release capsule TAKE TWO CAPSULES BY MOUTH ONCE AT BEDTIME(GENERALIZED ANXIETY DISORDER, MAJOR DEPRESSIVE DISORDER) 180 Cap 1   • aripiprazole (ABILIFY) 5 MG tablet TAKE 1 TABLET BY MOUTH ONCE DAILY IN THE MORNING 90 Tab 0   • Blood Glucose Monitoring Suppl SUPPLIES Misc Test strips order: Test strips for one touch ultra meter.Sig: use qd and prn ssx high or low sugar #100 RF x 3 DX: E11 100 Each 3   • lamotrigine (LAMICTAL) 200 MG tablet Take 1 Tab by mouth 2 times a day.  60 Tab 5   • rosuvastatin (CRESTOR) 10 MG Tab Take 1 Tab by mouth every evening. 30 Tab 11   • levothyroxine (SYNTHROID) 75 MCG Tab TAKE 1 TABLET BY MOUTH ONCE DAILY IN THE MORNING ON AN EMPTY STOMACH 90 Tab 2   • fluticasone (FLONASE) 50 MCG/ACT nasal spray SHAKE LIQUID AND USE 1 SPRAY IN EACH NOSTRIL TWICE DAILY 1 Bottle 1   • lorazepam (ATIVAN) 0.5 MG Tab TAKE 1 TABLET BY MOUTH EVERY 12 HOURS AS NEEDED FOR ANXIETY 30 Tab 0   • SPIRIVA HANDIHALER 18 MCG Cap INHALE THE CONTENTS OF 1 CAPSULE BY MOUTH VIA HANDIHALER ONCE DAILY 30 Cap 2   • mupirocin calcium (BACTROBAN) 2 % Cream Apply small amount to affected area bid for up to 10 days. 1 Tube 0   • losartan-hydrochlorothiazide (HYZAAR) 50-12.5 MG per tablet TAKE 1 TABLET BY MOUTH EVERY DAY 90 Tab 1   • albuterol 108 (90 BASE) MCG/ACT Aero Soln inhalation aerosol Inhale 1-2 Puffs by mouth every 6 hours as needed. Give albuterol that is patient or insurance preference please 8.5 g 1   • KLOR-CON 8 MEQ tablet TAKE 1 TABLET BY MOUTH ONCE DAILY 90 Tab 1   • cilostazol (PLETAL) 100 MG Tab TAKE 1 TABLET BY MOUTH TWICE DAILY(LEG PAIN) 90 Tab 3   • Oxycodone-Acetaminophen (PERCOCET-10)  MG Tab Take 1 Tab by mouth every four hours as needed for Severe Pain.     • senna-docusate (PERICOLACE OR SENOKOT S) 8.6-50 MG Tab Take 1 Tab by mouth every day.     • baclofen (LIORESAL) 10 MG TABS Take 10 mg by mouth 2 times a day.     • Diclofenac Sodium (VOLTAREN) 1 % GEL Apply thin layer to affected area tid, prn. 1 Tube 1   • aspirin 81 MG tablet Take 81 mg by mouth every day.       No current facility-administered medications for this visit.      Current supplements: .ll  Chronic narcotic pain medicines: yes  Allergies: Codeine; Latex; Sulfa drugs; and Tape  Exercise: walks short distance with caregiver  Current social contact/activities: Has support of family and friends      Screening:  Depression Screening    Little interest or pleasure in doing things?  0 - not at  all  Feeling down, depressed , or hopeless? 0 - not at all  Trouble falling or staying asleep, or sleeping too much?     Feeling tired or having little energy?     Poor appetite or overeating?     Feeling bad about yourself - or that you are a failure or have let yourself or your family down?    Trouble concentrating on things, such as reading the newspaper or watching television?    Moving or speaking so slowly that other people could have noticed.  Or the opposite - being so fidgety or restless that you have been moving around a lot more than usual?     Thoughts that you would be better off dead, or of hurting yourself?     Patient Health Questionnaire Score:      If depressive symptoms identified deferred to follow up visit unless specifically addressed in assessment and plan.    Interpretation of PHQ-9 Total Score   Score Severity   1-4 No Depression   5-9 Mild Depression   10-14 Moderate Depression   15-19 Moderately Severe Depression   20-27 Severe Depression      Screening for Cognitive Impairment    Three Minute Recall (apple, watch, vinh)  3/3    Draw clock face with all 12 numbers set to the hand to show 10 minutes past 11 o'clock  0 3  Cognitive concerns identified deferred for follow up unless specifically addressed in assessment and plan.    Fall Risk Assessment    Has the patient had two or more falls in the last year or any fall with injury in the last year?  Yes    Safety Assessment    Throw rugs on floor.  No  Handrails on all stairs.  Yes  Good lighting in all hallways.  Yes  Difficulty hearing.  No  Patient counseled about all safety risks that were identified.    Functional Assessment ADLs    Are there any barriers preventing you from cooking for yourself or meeting nutritional needs?  Yes. Patient has a hard time standing up.  Her daughter helps make meals.   Are there any barriers preventing you from driving safely or obtaining transportation?  Yes. Son in law or daughter provide all  transportation for the patient.   Are there any barriers preventing you from using a telephone or calling for help?  No.    Are there any barriers preventing you from shopping?  Yes. Daughter and care giver do all of the shopping.   Are there any barriers preventing you from taking care of your own finances?  Yes. Daughter manages finances.   Are there any barriers preventing you from managing your medications?  No.    Are currently engaging any exercise or physical activity?  Yes.       Health Maintenance Summary                Annual Wellness Visit Overdue 1940     PFT SCREENING-FEV1 AND FEV/FVC RATIO / SPIROMETRY SHOULD BE PERFORMED ANNUALLY Overdue 1/25/2012      Done 1/25/2011 PFT DICTATED RESULTS    RETINAL SCREENING Overdue 8/27/2016      Done 8/27/2015 AMB REFERRAL FOR RETINAL SCREENING EXAM    DIABETES MONOFILAMENT / LE EXAM Overdue 11/24/2016      Patient Declined 11/24/2015 has upcoming appt with podiatry.    IMM PNEUMOCOCCAL 65+ (ADULT) LOW/MEDIUM RISK SERIES Overdue 12/17/2016      Done 12/17/2015 Imm Admin: Pneumococcal Conjugate Vaccine (Prevnar/PCV-13)    MAMMOGRAM Next Due 8/7/2017      Done 8/7/2015 MA-SCREEN MAMMO W/CAD-BILAT    IMM INFLUENZA Next Due 9/1/2017      Done 10/27/2016 Imm Admin: Influenza Vaccine Adult HD     Patient has more history with this topic...    SERUM CREATININE Next Due 10/7/2017      Done 10/7/2016 COMP METABOLIC PANEL (A)     Patient has more history with this topic...    A1C SCREENING Next Due 10/10/2017      Done 4/10/2017 POCT A1C     Patient has more history with this topic...    FASTING LIPID PROFILE Next Due 3/16/2018      Done 3/16/2017 LIPID PROFILE     Patient has more history with this topic...    URINE ACR / MICROALBUMIN Next Due 4/10/2018      Done 4/10/2017 MICROALBUMIN CREAT RATIO URINE     Patient has more history with this topic...    BONE DENSITY Next Due 8/7/2020      Done 8/7/2015 DS-BONE DENSITY STUDY (DEXA)    COLONOSCOPY Next Due 10/1/2020       Done 10/1/2015 AMB REFERRAL TO GI FOR COLONOSCOPY    IMM DTaP/Tdap/Td Vaccine Next Due 3/20/2025      Done 3/20/2015 Imm Admin: Tdap Vaccine     Patient has more history with this topic...          Patient Care Team:  SIOBHAN Cao as PCP - General (Family Medicine)  [facility] Michael J Bloch, M.D. (Internal Medicine)  Lu Willis O.D. as Consulting Physician (Optometry)  Jean Marie Solis M.D. as Consulting Physician (Psychiatry)        Social History   Substance Use Topics   • Smoking status: Current Every Day Smoker -- 0.50 packs/day for 55 years     Types: Cigarettes   • Smokeless tobacco: Never Used      Comment: 1 PPDcigarettes daily   • Alcohol Use: No       Family History   Problem Relation Age of Onset   • Diabetes Father    • Diabetes Sister    • Diabetes Brother    • Lung Disease Neg Hx    • Cancer Neg Hx    • Heart Disease Neg Hx    • Hypertension Neg Hx    • Hyperlipidemia Neg Hx    • Stroke Neg Hx    • Alcohol/Drug Neg Hx    • No Known Problems Mother      She  has a past medical history of Depression (7/27/2010); Hyperlipidemia (7/27/2010); Diabetes; S/P CABG (coronary artery bypass graft) (7/27/2010); Hypertension; Anxiety; Cancer (CMS-[facility]) (2008); COPD (chronic obstructive pulmonary disease) (CMS-HCC); Thyroid disease; Muscle disorder; Tobacco dependence; Arthritis; Urinary incontinence; Dental disorder; Cataract; Pain (12-10-15); Scoliosis; PAD (peripheral artery disease) (CMS-HCC); Asthma; Urinary bladder disorder; and Encounter for long-term (current) use of other medications.   Past Surgical History   Procedure Laterality Date   • Multiple coronary artery bypass  2008   • Vaginal hysterectomy total       1980's, dysplasia   • Laminotomy     • Appendectomy     • Pr inj dx/ther agnt paravert facet joint, george*  3/21/2011     Performed by LILIBETH FUNES at [facility]   • Pr inj dx/ther agnt paravert facet joint, george*  4/4/2011     Performed by LILIBETH FUNES  "at [facility]   • Pr dest,paravertebral,l/s,single  4/11/2011     Performed by LILIBETH FUNES at [facility]   • Other orthopedic surgery       repair left leg femur fx   • Lobectomy  2008     left upper lobectomy for lung cancer   • Carotid endarterectomy Left 12/16/2015     Procedure: CAROTID ENDARTERECTOMY;  Surgeon: Feliciano Bernabe M.D.;  Location: SURGERY [facility];  Service:    • Recovery  1/15/2016     Procedure: IR1 VASCULAR CASE BERNABE ABDOMINAL AORTOGRAM WITH RUNOFF, POSSIBLE INTERVENTION;  Surgeon: Recoveryonsumanth Surgery;  Location: SURGERY PRE-POST PROC UNIT [facility] [place];  Service:        ROS:    Ostomy or other tubes or amputations: no  Chronic oxygen use no  Last eye exam 2016  : Denies incontinence.   Gait: Uses no assistive device   Hearing excellent.    Dentition good    Exam: Blood pressure 98/68, pulse 108, temperature 36.3 °C (97.3 °F), height 1.321 m (4' 4\"), weight 48.988 kg (108 lb), SpO2 97 %. Body mass index is 28.07 kg/(m^2).  Alert, oriented in no acute distress.  Eye contact is good, speech goal directed, affect calm      Assessment and Plan. The following treatment and monitoring plan is recommended for all problems as listed below:   Severe episode of recurrent major depressive disorder, without psychotic features (CMS-HCC)  Chronic condition managed with current medical regimen  Stable per review   Continue with current meds  Followed by mental health q 3 months       S/p CABG (coronary artery bypass graft)  Denies cardiac sxs   Continue with current meds  Followed by Felecia Staley A.P.R.N..     Risk for falls  Severe scoliosis, arthritis   Continue with walker  Followed by MASON Cao.P.R.N..     Peripheral arterial disease  Chronic condition managed with current medical regimen  Intermit claudication   Continue with current meds  Followed by MASON Cao.P.R.N..         Osteopenia  Per pt has a planned appt for DEXA and mammo in " "August 2017  Has plans to be seen by [facility] for eval and poss inj daily      Nicotine dependence, cigarettes, uncomplicated  Current smoker, not interest in smoking cessation at this time  Pt aware of adverse affects of smoking  Followed by SIOBHAN Cao.     Left carotid artery stenosis  6/9/2017 carotid duplex R 50-69% involvement, no surgical intervention at this time  Denies amaurosis fugax   Continue with current meds  Followed by SIOBHAN Cao.     Iron deficiency anemia  Followed by SIOBHAN Cao.     Hypothyroidism, postradioiodine therapy  Chronic condition managed with current medical regimen  Stable per review   Continue with current meds  Followed by SIOBHAN Cao.        Intermittent claudication  Chronic condition managed with current medical regimen  Intermit claudication   Continue with current meds  Followed by SIOBHAN Cao.         Hypertension associated with diabetes (CMS-HCC)  Chronic condition managed with current medical regimen  Stable per review with BP today 98/60   Continue with current meds  Followed by SIOBHAN Cao.        Intertrochanteric fracture of left femur (CMS-HCC)  Surgery with a gulshan and pins  Uses a walker  States fell at home in [place] which precipitated her permanent move with her family to [place]    Hyperlipidemia associated with type 2 diabetes mellitus (CMS-HCC)  Chronic condition managed with current medical regimen  Labs reviewed   Stable per review   Continue with current meds  Followed by SIOBHAN Cao.        History of lung cancer  Occurrence 12/2008, believes \"removed top of my L lung\"  No chemo or radiation  Cancer free to date  Followed by SIOBHAN Cao.     Diabetes mellitus type 2, controlled (CMS-HCC)  Chronic condition managed with current medical regimen  107/2016    Glucose 99 65 - 99 mg/dL Final   Home glucose testing \"nver above 120\"  Stable per " review   Continue with current meds  Followed by SIOBHAN Cao.        COPD (chronic obstructive pulmonary disease)  Chronic condition managed with current medical regimen  Stable per review with medication   Continue with current meds  Followed by SIOBHAN Cao.        Chronic pain  Per pt has chronic pain due to scolisis, DJD   Continue with current meds  Followed by SIOBHAN Cao.     Carotid bruit  Historical data    CAD (coronary artery disease)  Chronic condition managed with current medical regimen  Stable per review, denies cardiac sxs   Continue with current meds  Followed by SIOBHAN Cao.        Blue toes  Has PVD  Has had LE ART LES studies done  Discussed affect of smoking with PVD  Followed by SIOBHAN Cao.     Abdominal bruit  Aorta u/s studies done, no intervention at this time  Followed by SIOBHAN Cao.         Health Care Screening recommendations reviewed with patient today: per Patient Instructions  DPA/Advanced directive: .has NO advanced directive - not interested in additional information    Next office visit for recheck of chronic medical conditions is due with LOREN Cao 11/15/2017      Pt states monofilament testing was done at Dr Blochs

## 2017-07-26 NOTE — MR AVS SNAPSHOT
"Mia Hammond   2017 4:20 PM   Office Visit   MRN: [id number]    Department:  [facility], [place], operated by [facility]   Dept Phone:  [phone]    Description:  Female : 1940   Provider:  SIOBHAN Nash           Reason for Visit     Annual Exam initial      Allergies as of 2017     Allergen Noted Reactions    Codeine 2010   Vomiting    Latex 2017   Hives    Sulfa Drugs 2010   Hives    Tape 2015       Paper ok      You were diagnosed with     Medicare annual wellness visit, initial   [619255]  -  Primary     Severe episode of recurrent major depressive disorder, without psychotic features (CMS-HCC)   [4681551]       S/P CABG (coronary artery bypass graft)   [688811]       Chronic bronchitis, unspecified chronic bronchitis type (CMS-HCC)   [0800544]       Risk for falls   [730281]       Peripheral arterial disease (CMS-HCC)   [301512]       Nicotine dependence, cigarettes, uncomplicated   [2780782]       Left carotid artery stenosis   [540298]       Hypothyroidism, postradioiodine therapy   [388788]       Intermittent claudication (CMS-[facility])   [846508]       Hypertension associated with diabetes (CMS-HCC)   [019762]       Hyperlipidemia associated with type 2 diabetes mellitus (CMS-HCC)   [6450053]       History of lung cancer   [571004]       Bruit of left carotid artery   [190207]       Blue toes   [899766]       Abdominal bruit   [460646]         Vital Signs     Blood Pressure Pulse Temperature Height Weight Body Mass Index    98/68 mmHg 108 36.3 °C (97.3 °F) 1.321 m (4' 4\") 48.988 kg (108 lb) 28.07 kg/m2    Oxygen Saturation Smoking Status                97% Current Every Day Smoker          Basic Information     Date Of Birth Sex Race Ethnicity Preferred Language    1940 Female White Non- English      Your appointments     Oct 12, 2017  4:30 PM   Follow Up Med Management with Jean Marie Solis M.D.   [facility] [address] [place] [address] ([facility])    [address] [place] [address] Street "  [address]  [facility] [address]   [phone]            Nov 15, 2017  4:15 PM   Established Patient with SIOBHAN Cao   [facility] ([place])    [address] [facility] [address], [address]  [facility] [address] [phone]           You will be receiving a confirmation call a few days before your appointment from our automated call confirmation system.            Jan 02, 2018  3:40 PM   Follow Up Visit with VASCULAR NURSE PRACTITIONER   [facility]  (--)    [address] [facility] [address]   [phone]              Problem List              ICD-10-CM Priority Class Noted - Resolved    Severe episode of recurrent major depressive disorder, without psychotic features (CMS-HCC) F33.2   7/27/2010 - Present    Hyperlipidemia associated with type 2 diabetes mellitus (CMS-HCC) E11.69, E78.5   7/27/2010 - Present    Hypertension associated with diabetes (CMS-HCC) E11.59, I10   7/27/2010 - Present    Diabetes mellitus type 2, controlled (CMS-HCC) E11.9   7/27/2010 - Present    History of lung cancer Z85.118   7/27/2010 - Present    CAD (coronary artery disease) I25.10   7/27/2010 - Present    S/P CABG (coronary artery bypass graft) Z95.1   7/27/2010 - Present    Peripheral arterial disease (CMS-HCC) I73.9   7/27/2010 - Present    Abdominal bruit R09.89   7/27/2010 - Present    Intermittent claudication (CMS-HCC) I73.9   10/12/2010 - Present    Nicotine dependence, cigarettes, uncomplicated F17.210   1/17/2011 - Present    COPD (chronic obstructive pulmonary disease) (CMS-HCC) J44.9   2/2/2011 - Present    Blue toes R23.0   2/2/2011 - Present    Chronic pain G89.29   7/26/2015 - Present    Osteopenia M85.80   8/23/2015 - Present    Risk for falls Z91.81   8/23/2015 - Present    Intertrochanteric fracture of left femur (CMS-HCC) S72.142A   9/27/2015 - Present    Left carotid artery stenosis I65.22   12/16/2015 - Present    Iron deficiency  anemia D50.9   10/27/2016 - Present    Hypothyroidism, postradioiodine therapy E89.0   10/27/2016 - Present    Chronic bronchitis (CMS-HCC) J42   7/26/2017 - Present      Health Maintenance        Date Due Completion Dates    RETINAL SCREENING 8/27/2016 8/27/2015    DIABETES MONOFILAMENT / LE EXAM 11/24/2016 11/24/2015 (Declined)    Override on 11/24/2015: Patient Declined (has upcoming appt with podiatry.)    IMM PNEUMOCOCCAL 65+ (ADULT) LOW/MEDIUM RISK SERIES (2 of 2 - PPSV23) 12/17/2016 12/17/2015    MAMMOGRAM 8/7/2017 8/7/2015    IMM INFLUENZA (1) 9/1/2017 10/27/2016, 11/23/2015    SERUM CREATININE 10/7/2017 10/7/2016, 3/30/2016, 1/11/2016, 12/11/2015, 7/27/2015, 1/14/2011, 8/3/2010    A1C SCREENING 10/10/2017 4/10/2017, 10/7/2016, 3/30/2016, 7/27/2015, 1/14/2011, 8/3/2010    FASTING LIPID PROFILE 3/16/2018 3/16/2017, 10/7/2016, 3/30/2016, 1/11/2016, 7/27/2015, 1/14/2011, 8/3/2010    URINE ACR / MICROALBUMIN 4/10/2018 4/10/2017, 3/30/2016, 7/27/2015    BONE DENSITY 8/7/2020 8/7/2015    COLONOSCOPY 10/1/2020 10/1/2015    IMM DTaP/Tdap/Td Vaccine (3 - Td) 3/20/2025 3/20/2015, 1/24/2012            Current Immunizations     13-VALENT PCV PREVNAR 12/17/2015  6:09 AM    Influenza Vaccine Adult HD 10/27/2016    Influenza Vaccine Quad Inj (Preserved) 11/23/2015    SHINGLES VACCINE 6/1/2013    Tdap Vaccine 3/20/2015, 1/24/2012      Below and/or attached are the medications your provider expects you to take. Review all of your home medications and newly ordered medications with your provider and/or pharmacist. Follow medication instructions as directed by your provider and/or pharmacist. Please keep your medication list with you and share with your provider. Update the information when medications are discontinued, doses are changed, or new medications (including over-the-counter products) are added; and carry medication information at all times in the event of emergency situations     Allergies:  CODEINE - Vomiting      LATEX - Hives     SULFA DRUGS - Hives     TAPE - (reactions not documented)               Medications  Valid as of: July 27, 2017 - 12:34 PM    Generic Name Brand Name Tablet Size Instructions for use    Albuterol Sulfate (Aero Soln) albuterol 108 (90 BASE) MCG/ACT Inhale 1-2 Puffs by mouth every 6 hours as needed. Give albuterol that is patient or insurance preference please        ARIPiprazole (Tab) ABILIFY 5 MG TAKE 1 TABLET BY MOUTH ONCE DAILY IN THE MORNING        Aspirin (Tab) aspirin 81 MG Take 81 mg by mouth every day.        Baclofen (Tab) LIORESAL 10 MG Take 10 mg by mouth 2 times a day.        Blood Glucose Monitoring Suppl (Misc) Blood Glucose Monitoring Suppl SUPPLIES Test strips order: Test strips for one touch ultra meter.Sig: use qd and prn ssx high or low sugar #100 RF x 3 DX: E11        Cilostazol (Tab) PLETAL 100 MG TAKE 1 TABLET BY MOUTH TWICE DAILY(LEG PAIN)        Diclofenac Sodium (Gel) Diclofenac Sodium 1 % Apply thin layer to affected area tid, prn.        DULoxetine HCl (Cap DR Particles) CYMBALTA 60 MG TAKE TWO CAPSULES BY MOUTH ONCE AT BEDTIME(GENERALIZED ANXIETY DISORDER, MAJOR DEPRESSIVE DISORDER)        Fluticasone Propionate (Suspension) FLONASE 50 MCG/ACT SHAKE LIQUID AND USE 1 SPRAY IN EACH NOSTRIL TWICE DAILY        Furosemide (Tab) LASIX 40 MG Take 40 mg by mouth every day.        LamoTRIgine (Tab) LAMICTAL 200 MG Take 1 Tab by mouth 2 times a day.        Levothyroxine Sodium (Tab) SYNTHROID 75 MCG TAKE 1 TABLET BY MOUTH ONCE DAILY IN THE MORNING ON AN EMPTY STOMACH        LORazepam (Tab) ATIVAN 0.5 MG TAKE 1 TABLET BY MOUTH EVERY 12 HOURS AS NEEDED FOR ANXIETY        Losartan Potassium-HCTZ (Tab) HYZAAR 50-12.5 MG TAKE 1 TABLET BY MOUTH EVERY DAY        MetFORMIN HCl (Tab) GLUCOPHAGE 500 MG Take 500 mg by mouth 2 times a day, with meals.        Mupirocin Calcium (Cream) BACTROBAN 2 % Apply small amount to affected area bid for up to 10 days.        Oxycodone-Acetaminophen (Tab)  PERCOCET-10  MG Take 1 Tab by mouth every four hours as needed for Severe Pain.        Potassium Chloride (Tab CR) KLOR-CON 8 MEQ TAKE 1 TABLET BY MOUTH ONCE DAILY        Rosuvastatin Calcium (Tab) CRESTOR 10 MG Take 1 Tab by mouth every evening.        Sennosides-Docusate Sodium (Tab) PERICOLACE or SENOKOT S 8.6-50 MG Take 1 Tab by mouth every day.        Tiotropium Bromide Monohydrate (Cap) SPIRIVA HANDIHALER 18 MCG INHALE THE CONTENTS OF 1 CAPSULE BY MOUTH VIA HANDIHALER ONCE DAILY        .                 Medicines prescribed today were sent to:     [facility] - [place], [place] - [address] AT [facility] [address]    [address] [place] [address]    Phone: [phone] Fax: [phone]    Open 24 Hours?: No      Medication refill instructions:       If your prescription bottle indicates you have medication refills left, it is not necessary to call your provider’s office. Please contact your pharmacy and they will refill your medication.    If your prescription bottle indicates you do not have any refills left, you may request refills at any time through one of the following ways: The online [employer] system (except Urgent Care), by calling your provider’s office, or by asking your pharmacy to contact your provider’s office with a refill request. Medication refills are processed only during regular business hours and may not be available until the next business day. Your provider may request additional information or to have a follow-up visit with you prior to refilling your medication.   *Please Note: Medication refills are assigned a new Rx number when refilled electronically. Your pharmacy may indicate that no refills were authorized even though a new prescription for the same medication is available at the pharmacy. Please request the medicine by name with the pharmacy before contacting your provider for a refill.        Instructions    Continue with care through Felecia KRAUSE  SIOBHAN Staley.  Next Medicare Annual Wellness Visit is due in one year.    Continue care with specialists you are seeing for your chronic problems.    Attached is some preventative information for you to read.          Fall Prevention and Home Safety  Falls cause injuries and can affect all age groups. It is possible to prevent falls.   HOW TO PREVENT FALLS  · Wear shoes with rubber soles that do not have an opening for your toes.   · Keep the inside and outside of your house well lit.   · Use night lights throughout your home.   · Remove clutter from floors.   · Clean up floor spills.   · Remove throw rugs or fasten them to the floor with carpet tape.   · Do not place electrical cords across pathways.   · Put grab bars by your tub, shower, and toilet. Do not use towel bars as grab bars.   · Put handrails on both sides of the stairway. Fix loose handrails.   · Do not climb on stools or stepladders, if possible.   · Do not wax your floors.   · Repair uneven or unsafe sidewalks, walkways, or stairs.   · Keep items you use a lot within reach.   · Be aware of pets.   · Keep emergency numbers next to the telephone.   · Put smoke detectors in your home and near bedrooms.   Ask your doctor what other things you can do to prevent falls.  Document Released: 10/14/2010 Document Revised: 06/18/2013 Document Reviewed: 03/19/2013  ExitCare® Patient Information ©2013 [employer].    Exercise to Stay Healthy      Exercise helps you become and stay healthy.  EXERCISE IDEAS AND TIPS  Choose exercises that:  · You enjoy.   · Fit into your day.   You do not need to exercise really hard to be healthy. You can do exercises at a slow or medium level and stay healthy. You can:  · Stretch before and after working out.   · Try yoga, Pilates, or jalyn chi.   · Lift weights.   · Walk fast, swim, jog, run, climb stairs, bicycle, dance, or rollerskate.   · Take aerobic classes.   Exercises that burn about 150 calories:  · Running 1 ½ miles  in 15 minutes.   · Playing volleyball for 45 to 60 minutes.   · Washing and waxing a car for 45 to 60 minutes.   · Playing touch football for 45 minutes.   · Walking 1 ¾ miles in 35 minutes.   · Pushing a stroller 1 ½ miles in 30 minutes.   · Playing basketball for 30 minutes.   · Raking leaves for 30 minutes.   · Bicycling 5 miles in 30 minutes.   · Walking 2 miles in 30 minutes.   · Dancing for 30 minutes.   · Shoveling snow for 15 minutes.   · Swimming laps for 20 minutes.   · Walking up stairs for 15 minutes.   · Bicycling 4 miles in 15 minutes.   · Gardening for 30 to 45 minutes.   · Jumping rope for 15 minutes.   · Washing windows or floors for 45 to 60 minutes.     One suggestion is to start walking for 10 minutes after each meal.  This will help with digestion and help you to metabolize your meal.       For Weight Loss -   Recommend portion sizes with more fruits/vegetables/high fiber foods.  Stay away from white bread/pastas/white rice/white potatoes.  Increase Fluid intake to 6-8 glasses of water daily.   Eliminate soda's (diet and regular) from your fluid intake.      Document Released: 01/20/2012 Document Revised: 03/11/2013 Document Reviewed: 01/20/2012  ExitCare® Patient Information ©2013 [employer].    Fat and Cholesterol Control Diet  Cholesterol is a wax-like substance. It comes from your liver and is found in certain foods. There is good (HDL) and bad (LDL) cholesterol. Too much cholesterol in your blood can affect your heart. Certain foods can lower or raise your cholesterol. Eat foods that are low in cholesterol.  Saturated and trans fats are bad fats found in foods that will raise your cholesterol. Do not eat foods that are high in saturated and trans fats.  FOODS HIGHER IN SATURATED AND TRANS FATS  · Dairy products, such as whole milk, eggs, cheese, cream, and butter.   · Fatty meats, such as hot dogs, sausage, and salami.   · Fried foods.   · Trans fats which are found in margarine and  pre-made cookies, crackers, and baked goods.   · Tropical oils, such as coconut and palm oils.   Read package labels at the store. Do not buy products that use saturated or trans fats or hydrogenated oils. Find foods labeled:  · Low-fat.   · Low-saturated fat.   · Trans-fat-free.   · Low-cholesterol.   FOODS LOWER IN CHOLESTEROL  ·  Fruit.   · Vegetables.   · Beans, peas, and lentils.   · Fish.   · Lean meat, such as chicken (without skin) or ground turkey.   · Grains, such as barley, rice, couscous, bulgur wheat, and pasta.   · Heart-healthy tub margarine.   PREPARING YOUR FOOD  · Broil, bake, steam, or roast foods. Do not cochran food.   · Use non-stick cooking sprays.   · Use lemon or herbs to flavor food instead of using butter or stick margarine.   · Use nonfat yogurt, salsa, or low-fat dressings for salads.   Document Released: 06/18/2013 Document Reviewed: 06/18/2013  [employer]® Patient Information ©2013 [employer].    Recommend annual flu vaccine.  Next due in Fall, 2015.  If you decide not to have the flu vaccine, recommend good handwashing and staying out of crowds during flu season.                    MyChart Status: Patient Declined        Quit Tobacco Information     Do you want to quit using tobacco?    Quitting tobacco decreases risks of cancer, heart and lung disease, increases life expectancy, improves sense of taste and smell, and increases spending money, among other benefits.    If you are thinking about quitting, we can help.  • [facility] Quit Tobacco Program: [phone]  o Program occurs weekly for four weeks and includes pharmacist consultation on products to support quitting smoking or chewing tobacco. A provider referral is needed for pharmacist consultation.  • Tobacco Users Help Hotline: 8-545-QUIT-NOW (479-5185) or https://[place].quitlogix.org/  o Free, confidential telephone and online coaching for [place] residents. Sessions are designed on a schedule that is convenient for you. Eligible  clients receive free nicotine replacement therapy.  • Nationally: www.smokefree.gov  o Information and professional assistance to support both immediate and long-term needs as you become, and remain, a non-smoker. Smokefree.gov allows you to choose the help that best fits your needs.

## 2017-07-27 DIAGNOSIS — I15.2 HYPERTENSION DUE TO ENDOCRINE DISORDER: ICD-10-CM

## 2017-07-27 NOTE — ASSESSMENT & PLAN NOTE
Chronic condition managed with current medical regimen  Intermit claudication   Continue with current meds  Followed by SIOBHAN Cao.

## 2017-07-27 NOTE — PATIENT INSTRUCTIONS
Continue with care through SIOBHAN Cao.  Next Medicare Annual Wellness Visit is due in one year.    Continue care with specialists you are seeing for your chronic problems.    Attached is some preventative information for you to read.          Fall Prevention and Home Safety  Falls cause injuries and can affect all age groups. It is possible to prevent falls.   HOW TO PREVENT FALLS  · Wear shoes with rubber soles that do not have an opening for your toes.   · Keep the inside and outside of your house well lit.   · Use night lights throughout your home.   · Remove clutter from floors.   · Clean up floor spills.   · Remove throw rugs or fasten them to the floor with carpet tape.   · Do not place electrical cords across pathways.   · Put grab bars by your tub, shower, and toilet. Do not use towel bars as grab bars.   · Put handrails on both sides of the stairway. Fix loose handrails.   · Do not climb on stools or stepladders, if possible.   · Do not wax your floors.   · Repair uneven or unsafe sidewalks, walkways, or stairs.   · Keep items you use a lot within reach.   · Be aware of pets.   · Keep emergency numbers next to the telephone.   · Put smoke detectors in your home and near bedrooms.   Ask your doctor what other things you can do to prevent falls.  Document Released: 10/14/2010 Document Revised: 06/18/2013 Document Reviewed: 03/19/2013  ExitCare® Patient Information ©2013 [email or web address], [facility].    Exercise to Stay Healthy      Exercise helps you become and stay healthy.  EXERCISE IDEAS AND TIPS  Choose exercises that:  · You enjoy.   · Fit into your day.   You do not need to exercise really hard to be healthy. You can do exercises at a slow or medium level and stay healthy. You can:  · Stretch before and after working out.   · Try yoga, Pilates, or jalyn chi.   · Lift weights.   · Walk fast, swim, jog, run, climb stairs, bicycle, dance, or rollerskate.   · Take aerobic classes.   Exercises that burn about  150 calories:  · Running 1 ½ miles in 15 minutes.   · Playing volleyball for 45 to 60 minutes.   · Washing and waxing a car for 45 to 60 minutes.   · Playing touch football for 45 minutes.   · Walking 1 ¾ miles in 35 minutes.   · Pushing a stroller 1 ½ miles in 30 minutes.   · Playing basketball for 30 minutes.   · Raking leaves for 30 minutes.   · Bicycling 5 miles in 30 minutes.   · Walking 2 miles in 30 minutes.   · Dancing for 30 minutes.   · Shoveling snow for 15 minutes.   · Swimming laps for 20 minutes.   · Walking up stairs for 15 minutes.   · Bicycling 4 miles in 15 minutes.   · Gardening for 30 to 45 minutes.   · Jumping rope for 15 minutes.   · Washing windows or floors for 45 to 60 minutes.     One suggestion is to start walking for 10 minutes after each meal.  This will help with digestion and help you to metabolize your meal.       For Weight Loss -   Recommend portion sizes with more fruits/vegetables/high fiber foods.  Stay away from white bread/pastas/white rice/white potatoes.  Increase Fluid intake to 6-8 glasses of water daily.   Eliminate soda's (diet and regular) from your fluid intake.      Document Released: 01/20/2012 Document Revised: 03/11/2013 Document Reviewed: 01/20/2012  ExitCare® Patient Information ©2013 [employer].    Fat and Cholesterol Control Diet  Cholesterol is a wax-like substance. It comes from your liver and is found in certain foods. There is good (HDL) and bad (LDL) cholesterol. Too much cholesterol in your blood can affect your heart. Certain foods can lower or raise your cholesterol. Eat foods that are low in cholesterol.  Saturated and trans fats are bad fats found in foods that will raise your cholesterol. Do not eat foods that are high in saturated and trans fats.  FOODS HIGHER IN SATURATED AND TRANS FATS  · Dairy products, such as whole milk, eggs, cheese, cream, and butter.   · Fatty meats, such as hot dogs, sausage, and salami.   · Fried foods.   · Trans fats  which are found in margarine and pre-made cookies, crackers, and baked goods.   · Tropical oils, such as coconut and palm oils.   Read package labels at the store. Do not buy products that use saturated or trans fats or hydrogenated oils. Find foods labeled:  · Low-fat.   · Low-saturated fat.   · Trans-fat-free.   · Low-cholesterol.   FOODS LOWER IN CHOLESTEROL  ·  Fruit.   · Vegetables.   · Beans, peas, and lentils.   · Fish.   · Lean meat, such as chicken (without skin) or ground turkey.   · Grains, such as barley, rice, couscous, bulgur wheat, and pasta.   · Heart-healthy tub margarine.   PREPARING YOUR FOOD  · Broil, bake, steam, or roast foods. Do not cochran food.   · Use non-stick cooking sprays.   · Use lemon or herbs to flavor food instead of using butter or stick margarine.   · Use nonfat yogurt, salsa, or low-fat dressings for salads.   Document Released: 06/18/2013 Document Reviewed: 06/18/2013  ExitCare® Patient Information ©2013 [employer].    Recommend annual flu vaccine.  Next due in Fall, 2015.  If you decide not to have the flu vaccine, recommend good handwashing and staying out of crowds during flu season.

## 2017-07-27 NOTE — ASSESSMENT & PLAN NOTE
Chronic condition managed with current medical regimen  Stable per review   Continue with current meds  Followed by SIOBHAN Cao.

## 2017-07-27 NOTE — ASSESSMENT & PLAN NOTE
Current smoker, not interest in smoking cessation at this time  Pt aware of adverse affects of smoking  Followed by SIOBHAN Cao.

## 2017-07-27 NOTE — ASSESSMENT & PLAN NOTE
Per pt has a planned appt for DEXA and mammo in August 2017  Has plans to be seen by [facility] [place] for eval and poss inj daily

## 2017-07-27 NOTE — ASSESSMENT & PLAN NOTE
Chronic condition managed with current medical regimen  Stable per review, denies cardiac sxs   Continue with current meds  Followed by SIOBHAN Cao.

## 2017-07-27 NOTE — ASSESSMENT & PLAN NOTE
Chronic condition managed with current medical regimen  Labs reviewed   Stable per review   Continue with current meds  Followed by SIOBHAN Cao.

## 2017-07-27 NOTE — TELEPHONE ENCOUNTER
Was the patient seen in the last year in this department? Yes     Does patient have an active prescription for medications requested? No     Received Request Via: Pharmacy      Pt met protocol?: Yes pt last ov 7/2017   BP Readings from Last 1 Encounters:   07/26/17 98/68

## 2017-07-27 NOTE — ASSESSMENT & PLAN NOTE
Chronic condition managed with current medical regimen  Stable per review with medication   Continue with current meds  Followed by SIOBHAN Cao.

## 2017-07-27 NOTE — ASSESSMENT & PLAN NOTE
"Occurrence 12/2008, believes \"removed top of my L lung\"  No chemo or radiation  Cancer free to date  Followed by SIOBHAN Cao.   "

## 2017-07-27 NOTE — ASSESSMENT & PLAN NOTE
Has PVD  Has had LE ART LES studies done  Discussed affect of smoking with PVD  Followed by SIOBHAN Cao.

## 2017-07-27 NOTE — ASSESSMENT & PLAN NOTE
"Chronic condition managed with current medical regimen  107/2016    Glucose 99 65 - 99 mg/dL Final   Home glucose testing \"nver above 120\"  Stable per review   Continue with current meds  Followed by SIOBHAN Cao.      "

## 2017-07-27 NOTE — ASSESSMENT & PLAN NOTE
6/9/2017 carotid duplex R 50-69% involvement, no surgical intervention at this time  Denies amaurosis fugax   Continue with current meds  Followed by SIOBHAN Cao.

## 2017-07-27 NOTE — ASSESSMENT & PLAN NOTE
Surgery with a gulshan and pins  Uses a walker  States fell at home in [place] which precipitated her permanent move with her family to [place]

## 2017-07-27 NOTE — ASSESSMENT & PLAN NOTE
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

## 2017-07-27 NOTE — ASSESSMENT & PLAN NOTE
Chronic condition managed with current medical regimen  Stable per review with BP today 98/60   Continue with current meds  Followed by SIOBHAN Cao.

## 2017-07-28 RX ORDER — LOSARTAN POTASSIUM AND HYDROCHLOROTHIAZIDE 12.5; 1 MG/1; MG/1
TABLET ORAL
Qty: 90 TAB | Refills: 1 | OUTPATIENT
Start: 2017-07-28

## 2017-07-28 RX ORDER — LOSARTAN POTASSIUM AND HYDROCHLOROTHIAZIDE 12.5; 5 MG/1; MG/1
1 TABLET ORAL
Qty: 90 TAB | Refills: 1 | Status: SHIPPED | OUTPATIENT
Start: 2017-07-28 | End: 2018-03-04 | Stop reason: SDUPTHER

## 2017-07-28 NOTE — TELEPHONE ENCOUNTER
Refill X 6 months, sent to pharmacy.Pt. Seen in the last 6 months per protocol.   Lab Results   Component Value Date/Time    SODIUM 131* 10/07/2016 10:48 AM    POTASSIUM 3.7 10/07/2016 10:48 AM    CHLORIDE 94* 10/07/2016 10:48 AM    CO2 26 10/07/2016 10:48 AM    GLUCOSE 99 10/07/2016 10:48 AM    BUN 16 10/07/2016 10:48 AM    CREATININE 0.94 10/07/2016 10:48 AM    BUN-CREATININE RATIO 13 08/03/2010 12:00 AM    GLOM FILT RATE, EST 58* 08/03/2010 12:00 AM

## 2017-08-03 ENCOUNTER — HOSPITAL ENCOUNTER (OUTPATIENT)
Dept: LAB | Facility: MEDICAL CENTER | Age: 77
End: 2017-08-03
Attending: INTERNAL MEDICINE
Payer: MEDICARE

## 2017-08-03 DIAGNOSIS — E11.69 HYPERLIPIDEMIA ASSOCIATED WITH TYPE 2 DIABETES MELLITUS (HCC): ICD-10-CM

## 2017-08-03 DIAGNOSIS — E11.9 CONTROLLED TYPE 2 DIABETES MELLITUS WITHOUT COMPLICATION, WITHOUT LONG-TERM CURRENT USE OF INSULIN (HCC): ICD-10-CM

## 2017-08-03 DIAGNOSIS — E78.5 HYPERLIPIDEMIA ASSOCIATED WITH TYPE 2 DIABETES MELLITUS (HCC): ICD-10-CM

## 2017-08-03 DIAGNOSIS — I25.83 CORONARY ARTERY DISEASE DUE TO LIPID RICH PLAQUE: ICD-10-CM

## 2017-08-03 DIAGNOSIS — I25.10 CORONARY ARTERY DISEASE DUE TO LIPID RICH PLAQUE: ICD-10-CM

## 2017-08-03 DIAGNOSIS — E03.9 HYPOTHYROIDISM, UNSPECIFIED TYPE: ICD-10-CM

## 2017-08-03 DIAGNOSIS — M79.89 LEG SWELLING: ICD-10-CM

## 2017-08-03 LAB
ALBUMIN SERPL BCP-MCNC: 4.2 G/DL (ref 3.2–4.9)
ALBUMIN/GLOB SERPL: 1.6 G/DL
ALP SERPL-CCNC: 88 U/L (ref 30–99)
ALT SERPL-CCNC: 10 U/L (ref 2–50)
ANION GAP SERPL CALC-SCNC: 8 MMOL/L (ref 0–11.9)
AST SERPL-CCNC: 18 U/L (ref 12–45)
BILIRUB SERPL-MCNC: 0.6 MG/DL (ref 0.1–1.5)
BUN SERPL-MCNC: 13 MG/DL (ref 8–22)
CALCIUM SERPL-MCNC: 9.4 MG/DL (ref 8.5–10.5)
CHLORIDE SERPL-SCNC: 97 MMOL/L (ref 96–112)
CHOLEST SERPL-MCNC: 128 MG/DL (ref 100–199)
CO2 SERPL-SCNC: 25 MMOL/L (ref 20–33)
CREAT SERPL-MCNC: 0.75 MG/DL (ref 0.5–1.4)
ERYTHROCYTE [DISTWIDTH] IN BLOOD BY AUTOMATED COUNT: 45.1 FL (ref 35.9–50)
EST. AVERAGE GLUCOSE BLD GHB EST-MCNC: 137 MG/DL
GFR SERPL CREATININE-BSD FRML MDRD: >60 ML/MIN/1.73 M 2
GLOBULIN SER CALC-MCNC: 2.7 G/DL (ref 1.9–3.5)
GLUCOSE SERPL-MCNC: 79 MG/DL (ref 65–99)
HBA1C MFR BLD: 6.4 % (ref 0–5.6)
HCT VFR BLD AUTO: 40 % (ref 37–47)
HDLC SERPL-MCNC: 61 MG/DL
HGB BLD-MCNC: 13.4 G/DL (ref 12–16)
LDLC SERPL CALC-MCNC: 53 MG/DL
MCH RBC QN AUTO: 31.3 PG (ref 27–33)
MCHC RBC AUTO-ENTMCNC: 33.5 G/DL (ref 33.6–35)
MCV RBC AUTO: 93.5 FL (ref 81.4–97.8)
PLATELET # BLD AUTO: 110 K/UL (ref 164–446)
PMV BLD AUTO: 11.2 FL (ref 9–12.9)
POTASSIUM SERPL-SCNC: 3.8 MMOL/L (ref 3.6–5.5)
PROT SERPL-MCNC: 6.9 G/DL (ref 6–8.2)
RBC # BLD AUTO: 4.28 M/UL (ref 4.2–5.4)
SODIUM SERPL-SCNC: 130 MMOL/L (ref 135–145)
TRIGL SERPL-MCNC: 69 MG/DL (ref 0–149)
TSH SERPL DL<=0.005 MIU/L-ACNC: 4.24 UIU/ML (ref 0.3–3.7)
WBC # BLD AUTO: 8.9 K/UL (ref 4.8–10.8)

## 2017-08-03 PROCEDURE — 83704 LIPOPROTEIN BLD QUAN PART: CPT

## 2017-08-03 PROCEDURE — 36415 COLL VENOUS BLD VENIPUNCTURE: CPT

## 2017-08-03 PROCEDURE — 80053 COMPREHEN METABOLIC PANEL: CPT

## 2017-08-03 PROCEDURE — 80061 LIPID PANEL: CPT | Mod: 91

## 2017-08-03 PROCEDURE — 83036 HEMOGLOBIN GLYCOSYLATED A1C: CPT | Mod: GA

## 2017-08-03 PROCEDURE — 80061 LIPID PANEL: CPT

## 2017-08-03 PROCEDURE — 85027 COMPLETE CBC AUTOMATED: CPT

## 2017-08-03 PROCEDURE — 84443 ASSAY THYROID STIM HORMONE: CPT

## 2017-08-09 DIAGNOSIS — E89.0 HYPOTHYROIDISM, POSTRADIOIODINE THERAPY: ICD-10-CM

## 2017-08-09 LAB
CHOLEST SERPL-MCNC: 131 MG/DL (ref 100–199)
HDL PARTICAL NO Q4363: 29.8 UMOL/L
HDL SERPL QN: 10.4 NM
HDLC SERPL-MCNC: 69 MG/DL
HLD.LARGE SERPL-SCNC: 11.1 UMOL/L
LDL MED SERPL QN: 21.1 NM
LDL SERPL QN: 21.1 NM
LDL SERPL-SCNC: 510 NMOL/L
LDL SMALL SERPL-SCNC: <90 NMOL/L
LDL SMALL SERPL-SCNC: <90 NMOL/L
LDLC SERPL CALC-MCNC: 47 MG/DL (ref 0–99)
LP IR SCORE Q4364: <25
TRIGL SERPL-MCNC: 76 MG/DL (ref 0–149)
VLDL LARGE SERPL-SCNC: 1.3 NMOL/L
VLDL SERPL QN: 48.4 NM

## 2017-08-09 RX ORDER — LEVOTHYROXINE SODIUM 88 UG/1
88 TABLET ORAL
Qty: 30 TAB | Refills: 11 | Status: SHIPPED | OUTPATIENT
Start: 2017-08-09 | End: 2017-08-29 | Stop reason: SDUPTHER

## 2017-08-09 NOTE — PROGRESS NOTES
Reviewed patient's recent labwork.    TSH slightly elevated.  Left msg for pt to increase levothyroxine to 88mcg.  Recheck TSH in 6-8 weeks.    Della BLAIR

## 2017-08-10 ENCOUNTER — TELEPHONE (OUTPATIENT)
Dept: VASCULAR LAB | Facility: MEDICAL CENTER | Age: 77
End: 2017-08-10

## 2017-08-10 NOTE — TELEPHONE ENCOUNTER
Was the patient seen in the last year in this department? Yes     Does patient have an active prescription for medications requested? No     Received Request Via: Pharmacy      Pt met protocol?: Yes, LABS 8/3/17 OV 7/17, APPT 11/15/17

## 2017-08-10 NOTE — TELEPHONE ENCOUNTER
Felecia- At Medicare wellness visit Dr. Solis d/c'd med and said pt was now on 40mg. Not sure if that strength was initiated inpatient. Please refill as you see fit.

## 2017-08-11 RX ORDER — FUROSEMIDE 40 MG/1
TABLET ORAL
Refills: 0 | OUTPATIENT
Start: 2017-08-11

## 2017-08-11 NOTE — TELEPHONE ENCOUNTER
Pt notified. Pt states she does not have a cardiologist and wants to schedule an appointment to discuss a referral to cardiology and medication.

## 2017-08-11 NOTE — TELEPHONE ENCOUNTER
Please call patient - she should follow up with cardiology regarding need for or use of this medication. Her blood pressure was very low at last appointment and I did not increase dose to 40 mg.    Please notify pharmacy RX refused.     RX refused, NOT sent to pharmacy.  Requested Prescriptions     Refused Prescriptions Disp Refills   • furosemide (LASIX) 40 MG Tab [Pharmacy Med Name: FUROSEMIDE 40MG TABLETS]  0     Sig: TAKE 1 TABLET BY MOUTH ONCE DAILY     Refused By: CLAYTON LAYTON     Reason for Refusal: Refill not appropriate.       SIOBHAN Cao

## 2017-08-22 ENCOUNTER — OFFICE VISIT (OUTPATIENT)
Dept: MEDICAL GROUP | Facility: PHYSICIAN GROUP | Age: 77
End: 2017-08-22
Payer: MEDICARE

## 2017-08-22 VITALS
DIASTOLIC BLOOD PRESSURE: 68 MMHG | HEART RATE: 102 BPM | SYSTOLIC BLOOD PRESSURE: 126 MMHG | WEIGHT: 108 LBS | OXYGEN SATURATION: 91 % | HEIGHT: 55 IN | TEMPERATURE: 97.8 F | BODY MASS INDEX: 24.99 KG/M2 | RESPIRATION RATE: 16 BRPM

## 2017-08-22 DIAGNOSIS — E87.1 HYPONATREMIA: ICD-10-CM

## 2017-08-22 DIAGNOSIS — Z85.118 HISTORY OF LUNG CANCER: ICD-10-CM

## 2017-08-22 DIAGNOSIS — R60.0 BILATERAL LOWER EXTREMITY EDEMA: ICD-10-CM

## 2017-08-22 DIAGNOSIS — E89.0 HYPOTHYROIDISM, POSTRADIOIODINE THERAPY: ICD-10-CM

## 2017-08-22 DIAGNOSIS — F17.210 NICOTINE DEPENDENCE, CIGARETTES, UNCOMPLICATED: ICD-10-CM

## 2017-08-22 PROCEDURE — 99214 OFFICE O/P EST MOD 30 MIN: CPT | Performed by: FAMILY MEDICINE

## 2017-08-22 RX ORDER — FUROSEMIDE 40 MG/1
40 TABLET ORAL DAILY
Qty: 30 TAB | Refills: 2 | Status: SHIPPED | OUTPATIENT
Start: 2017-08-22 | End: 2018-03-20

## 2017-08-22 NOTE — PROGRESS NOTES
Subjective:     Chief Complaint   Patient presents with   • Leg Swelling     bilateral leg swelling        Mia Hammond is a 77 y.o. female here today for bilat LE leg swelling. Patient reports chronic bilateral lower extremity swelling which is episodic for many years. The patient reports she has taken Lasix in the past with resolution. Patient did not have Lasix during most recent leg swelling. Patient reports that for approximately 3-5 days she had lower extremity swelling with increased pain. Over the past 24 hours swelling has improved. Patient reports of swelling also improved overnight. Patient has chronic shortness of breath. She denies worsening shortness of breath, worsening shortness of breath on exertion, change in cough, chest pain, or orthopnea.     Patient was recently changed on thyroid medications due to elevated TSH. Patient is currently taking levothyroxine 88 µg daily. Patient has labs ordered to recheck in September.       Patient continues to follow with Dr. Bloch, cardiology, due to peripheral vascular disease and intermittent claudication.    Patient has a history of lung cancer. Patient reports lobectomy. She has not been seen by oncology in many years.    Patient continues to smoke daily. She is uninterested in quitting.  Allergies   Allergen Reactions   • Codeine Vomiting   • Latex Hives   • Sulfa Drugs Hives   • Tape      Paper ok     Current medicines (including changes today)  Current Outpatient Prescriptions   Medication Sig Dispense Refill   • furosemide (LASIX) 40 MG Tab Take 1 Tab by mouth every day. 30 Tab 2   • levothyroxine (SYNTHROID) 88 MCG Tab Take 1 Tab by mouth Every morning on an empty stomach. 30 Tab 11   • losartan-hydrochlorothiazide (HYZAAR) 50-12.5 MG per tablet Take 1 Tab by mouth every day. 90 Tab 1   • metformin (GLUCOPHAGE) 500 MG Tab Take 500 mg by mouth 2 times a day, with meals.     • duloxetine (CYMBALTA) 60 MG Cap DR Particles delayed-release capsule TAKE  TWO CAPSULES BY MOUTH ONCE AT BEDTIME(GENERALIZED ANXIETY DISORDER, MAJOR DEPRESSIVE DISORDER) 180 Cap 1   • aripiprazole (ABILIFY) 5 MG tablet TAKE 1 TABLET BY MOUTH ONCE DAILY IN THE MORNING 90 Tab 0   • Blood Glucose Monitoring Suppl SUPPLIES Misc Test strips order: Test strips for one touch ultra meter.Sig: use qd and prn ssx high or low sugar #100 RF x 3 DX: E11 100 Each 3   • lamotrigine (LAMICTAL) 200 MG tablet Take 1 Tab by mouth 2 times a day. 60 Tab 5   • rosuvastatin (CRESTOR) 10 MG Tab Take 1 Tab by mouth every evening. 30 Tab 11   • fluticasone (FLONASE) 50 MCG/ACT nasal spray SHAKE LIQUID AND USE 1 SPRAY IN EACH NOSTRIL TWICE DAILY 1 Bottle 1   • lorazepam (ATIVAN) 0.5 MG Tab TAKE 1 TABLET BY MOUTH EVERY 12 HOURS AS NEEDED FOR ANXIETY 30 Tab 0   • SPIRIVA HANDIHALER 18 MCG Cap INHALE THE CONTENTS OF 1 CAPSULE BY MOUTH VIA HANDIHALER ONCE DAILY 30 Cap 2   • mupirocin calcium (BACTROBAN) 2 % Cream Apply small amount to affected area bid for up to 10 days. 1 Tube 0   • albuterol 108 (90 BASE) MCG/ACT Aero Soln inhalation aerosol Inhale 1-2 Puffs by mouth every 6 hours as needed. Give albuterol that is patient or insurance preference please 8.5 g 1   • KLOR-CON 8 MEQ tablet TAKE 1 TABLET BY MOUTH ONCE DAILY 90 Tab 1   • cilostazol (PLETAL) 100 MG Tab TAKE 1 TABLET BY MOUTH TWICE DAILY(LEG PAIN) 90 Tab 3   • Oxycodone-Acetaminophen (PERCOCET-10)  MG Tab Take 1 Tab by mouth every four hours as needed for Severe Pain.     • senna-docusate (PERICOLACE OR SENOKOT S) 8.6-50 MG Tab Take 1 Tab by mouth every day.     • baclofen (LIORESAL) 10 MG TABS Take 10 mg by mouth 2 times a day.     • Diclofenac Sodium (VOLTAREN) 1 % GEL Apply thin layer to affected area tid, prn. 1 Tube 1   • aspirin 81 MG tablet Take 81 mg by mouth every day.       No current facility-administered medications for this visit.     Social History   Substance Use Topics   • Smoking status: Current Every Day Smoker -- 1.00 packs/day for 55  "years     Types: Cigarettes     Start date: 1961   • Smokeless tobacco: Never Used      Comment: 1 PPD cigarettes daily   • Alcohol Use: No     Family Status   Relation Status Death Age   • Father  87   • Sister Alive    • Brother Alive    • Mother  92     Family History   Problem Relation Age of Onset   • Diabetes Father    • Diabetes Sister    • Diabetes Brother    • Lung Disease Neg Hx    • Cancer Neg Hx    • Heart Disease Neg Hx    • Hypertension Neg Hx    • Hyperlipidemia Neg Hx    • Stroke Neg Hx    • Alcohol/Drug Neg Hx    • No Known Problems Mother      She    has a past medical history of Depression (2010); Hyperlipidemia (2010); Diabetes; S/P CABG (coronary artery bypass graft) (2010); Hypertension; Anxiety; Cancer (CMS-HCC) (); COPD (chronic obstructive pulmonary disease) (CMS-HCC); Thyroid disease; Muscle disorder; Tobacco dependence; Arthritis; Urinary incontinence; Dental disorder; Cataract; Pain (12-10-15); Scoliosis; PAD (peripheral artery disease) (CMS-HCC); Asthma; Urinary bladder disorder; and Encounter for long-term (current) use of other medications.        ROS   As per history of present illness. All others reviewed and negative       Objective:     Blood pressure 126/68, pulse 102, temperature 36.6 °C (97.8 °F), resp. rate 16, height 1.334 m (4' 4.5\"), weight 48.988 kg (108 lb), SpO2 91 %. Body mass index is 27.53 kg/(m^2).   Physical Exam:  Constitutional: Alert, no distress.  Skin: Warm, dry, good turgor, no rashes in visible areas.  Eye: Equal, round and reactive, conjunctiva clear, lids normal.  ENMT: Lips without lesions, oropharynx non-erythematous, no exudate, moist oral mucosa  Neck: Trachea midline, no masses, no thyromegaly. No cervical or supraclavicular lymphadenopathy. Full ROM, no JVC  Respiratory: Unlabored respiratory effort, good air movement, lungs clear to auscultation, no wheezes, no ronchi.  Cardiovascular:RRR, +S1, S2, no murmur, " 1+ peripheral edema, pedal pulses diminished bilaterally, cool toes which are purple/red   Abdomen: Soft, non-tender, no masses, no hepatosplenomegaly.  MSK:5/5 muscle strength in upper extremities as well as lower extremity bilaterally, ambulates with walker  Psych: Alert and oriented, appropriate affect and mood.        Assessment and Plan:   The following treatment plan was discussed    1. Bilateral lower extremity edema  Concern for cardiac issue. Patient has follow-up appointment with Dr. Bloch within the next 2 weeks. Recommend continue to follow with cardiology  - furosemide (LASIX) 40 MG Tab; Take 1 Tab by mouth every day.  Dispense: 30 Tab; Refill: 2    2. History of lung cancer  Refer to lung cancer screening  - REFERRAL TO LUNG CANCER SCREENING PROGRAM    3. Nicotine dependence, cigarettes, uncomplicated  Pt understands continuing to smoke will increase morbidity and mortality from cancer, stroke, and vascular disease. Patient is not interested in quitting  - REFERRAL TO LUNG CANCER SCREENING PROGRAM    4. Hypothyroidism, postradioiodine therapy  Chronic: Most recent TSH is elevated. We'll recheck. Hypothyroidism may be causing swelling.      5. Hyponatremia  Evident on most recent labs. May be due to hypothyroidism. Will recheck thyroid and sodium with change in levothyroxine.  - BASIC METABOLIC PANEL; Future      Followup: Return in about 8 weeks (around 10/17/2017) for chronic conditions.    Please note that this dictation was created using voice recognition software. I have made every reasonable attempt to correct obvious errors, but I expect that there are errors of grammar and possibly content that I did not discover before finalizing the note.

## 2017-08-22 NOTE — MR AVS SNAPSHOT
"Mia Hammond   2017 4:40 PM   Office Visit   MRN: [id number]    Department:  [facility]   Dept Phone:  [phone]    Description:  Female : 1940   Provider:  Cindy Rueda D.O.           Reason for Visit     Leg Swelling bilateral leg swelling       Allergies as of 2017     Allergen Noted Reactions    Codeine 2010   Vomiting    Latex 2017   Hives    Sulfa Drugs 2010   Hives    Tape 2015       Paper ok      You were diagnosed with     Bilateral lower extremity edema   [520737]       History of lung cancer   [471061]       Nicotine dependence, cigarettes, uncomplicated   [4767193]       Hypothyroidism, postradioiodine therapy   [520854]       Coronary artery disease due to lipid rich plaque   [0185236]       Hyponatremia   [310032]         Vital Signs     Blood Pressure Pulse Temperature Respirations Height Weight    126/68 mmHg 102 36.6 °C (97.8 °F) 16 1.334 m (4' 4.5\") 48.988 kg (108 lb)    Body Mass Index Oxygen Saturation Smoking Status             27.53 kg/m2 91% Current Every Day Smoker         Basic Information     Date Of Birth Sex Race Ethnicity Preferred Language    1940 Female White Non- English      Your appointments     Aug 29, 2017 10:40 AM   Follow Up Visit with VASCULAR NURSE PRACTITIONER   [facility]  (--)    [address] [address]   [phone]            Oct 12, 2017  4:30 PM   Follow Up Med Management with Jean Marie Solis M.D.   [facility] [address])    [address]  [address]  [facility] [address]   [phone]            Nov 15, 2017  4:15 PM   Established Patient with SIOBHAN Cao   [facility])    [address], [address]  [facility] [address] [phone]           You will be receiving a confirmation call a few days before your appointment from our automated call confirmation " system.            Jan 02, 2018  3:40 PM   Follow Up Visit with VASCULAR NURSE PRACTITIONER   [facility]  (--)    [address] [facility] [address] [facility] [address]   [phone]              Problem List              ICD-10-CM Priority Class Noted - Resolved    Severe episode of recurrent major depressive disorder, without psychotic features (CMS-HCC) F33.2   7/27/2010 - Present    Hyperlipidemia associated with type 2 diabetes mellitus (CMS-HCC) E11.69, E78.5   7/27/2010 - Present    Hypertension associated with diabetes (CMS-HCC) E11.59, I10   7/27/2010 - Present    Diabetes mellitus type 2, controlled (CMS-HCC) E11.9   7/27/2010 - Present    History of lung cancer Z85.118   7/27/2010 - Present    CAD (coronary artery disease) I25.10   7/27/2010 - Present    S/P CABG (coronary artery bypass graft) Z95.1   7/27/2010 - Present    Peripheral arterial disease (CMS-HCC) I73.9   7/27/2010 - Present    Abdominal bruit R09.89   7/27/2010 - Present    Intermittent claudication (CMS-HCC) I73.9   10/12/2010 - Present    Nicotine dependence, cigarettes, uncomplicated F17.210   1/17/2011 - Present    COPD (chronic obstructive pulmonary disease) (CMS-HCC) J44.9   2/2/2011 - Present    Blue toes R23.0   2/2/2011 - Present    Chronic pain G89.29   7/26/2015 - Present    Osteopenia M85.80   8/23/2015 - Present    Risk for falls Z91.81   8/23/2015 - Present    Intertrochanteric fracture of left femur (CMS-HCC) S72.142A   9/27/2015 - Present    Left carotid artery stenosis I65.22   12/16/2015 - Present    Iron deficiency anemia D50.9   10/27/2016 - Present    Hypothyroidism, postradioiodine therapy E89.0   10/27/2016 - Present    Chronic bronchitis (CMS-HCC) J42   7/26/2017 - Present      Health Maintenance        Date Due Completion Dates    RETINAL SCREENING 8/27/2016 8/27/2015    DIABETES MONOFILAMENT / LE EXAM 11/24/2016 11/24/2015 (Declined)    Override on 11/24/2015: Patient Declined  (has upcoming appt with podiatry.)    IMM PNEUMOCOCCAL 65+ (ADULT) LOW/MEDIUM RISK SERIES (2 of 2 - PPSV23) 12/17/2016 12/17/2015    MAMMOGRAM 8/7/2017 8/7/2015    IMM INFLUENZA (1) 9/1/2017 10/27/2016, 11/23/2015    A1C SCREENING 2/3/2018 8/3/2017, 4/10/2017, 10/7/2016, 3/30/2016, 7/27/2015, 1/14/2011, 8/3/2010    URINE ACR / MICROALBUMIN 4/10/2018 4/10/2017, 3/30/2016, 7/27/2015    FASTING LIPID PROFILE 8/3/2018 8/3/2017, 8/3/2017, 3/16/2017, 10/7/2016, 3/30/2016, 1/11/2016, 7/27/2015, 1/14/2011, 8/3/2010    SERUM CREATININE 8/3/2018 8/3/2017, 10/7/2016, 3/30/2016, 1/11/2016, 12/11/2015, 7/27/2015, 1/14/2011, 8/3/2010    BONE DENSITY 8/7/2020 8/7/2015    COLONOSCOPY 10/1/2020 10/1/2015    IMM DTaP/Tdap/Td Vaccine (3 - Td) 3/20/2025 3/20/2015, 1/24/2012            Current Immunizations     13-VALENT PCV PREVNAR 12/17/2015  6:09 AM    Influenza Vaccine Adult HD 10/27/2016    Influenza Vaccine Quad Inj (Preserved) 11/23/2015    SHINGLES VACCINE 6/1/2013    Tdap Vaccine 3/20/2015, 1/24/2012      Below and/or attached are the medications your provider expects you to take. Review all of your home medications and newly ordered medications with your provider and/or pharmacist. Follow medication instructions as directed by your provider and/or pharmacist. Please keep your medication list with you and share with your provider. Update the information when medications are discontinued, doses are changed, or new medications (including over-the-counter products) are added; and carry medication information at all times in the event of emergency situations     Allergies:  CODEINE - Vomiting     LATEX - Hives     SULFA DRUGS - Hives     TAPE - (reactions not documented)               Medications  Valid as of: August 22, 2017 -  5:09 PM    Generic Name Brand Name Tablet Size Instructions for use    Albuterol Sulfate (Aero Soln) albuterol 108 (90 Base) MCG/ACT Inhale 1-2 Puffs by mouth every 6 hours as needed. Give albuterol that is  patient or insurance preference please        ARIPiprazole (Tab) ABILIFY 5 MG TAKE 1 TABLET BY MOUTH ONCE DAILY IN THE MORNING        Aspirin (Tab) aspirin 81 MG Take 81 mg by mouth every day.        Baclofen (Tab) LIORESAL 10 MG Take 10 mg by mouth 2 times a day.        Blood Glucose Monitoring Suppl (Misc) Blood Glucose Monitoring Suppl Supplies Test strips order: Test strips for one touch ultra meter.Sig: use qd and prn ssx high or low sugar #100 RF x 3 DX: E11        Cilostazol (Tab) PLETAL 100 MG TAKE 1 TABLET BY MOUTH TWICE DAILY(LEG PAIN)        Diclofenac Sodium (Gel) Diclofenac Sodium 1 % Apply thin layer to affected area tid, prn.        DULoxetine HCl (Cap DR Particles) CYMBALTA 60 MG TAKE TWO CAPSULES BY MOUTH ONCE AT BEDTIME(GENERALIZED ANXIETY DISORDER, MAJOR DEPRESSIVE DISORDER)        Fluticasone Propionate (Suspension) FLONASE 50 MCG/ACT SHAKE LIQUID AND USE 1 SPRAY IN EACH NOSTRIL TWICE DAILY        Furosemide (Tab) LASIX 40 MG Take 1 Tab by mouth every day.        LamoTRIgine (Tab) LAMICTAL 200 MG Take 1 Tab by mouth 2 times a day.        Levothyroxine Sodium (Tab) SYNTHROID 88 MCG Take 1 Tab by mouth Every morning on an empty stomach.        LORazepam (Tab) ATIVAN 0.5 MG TAKE 1 TABLET BY MOUTH EVERY 12 HOURS AS NEEDED FOR ANXIETY        Losartan Potassium-HCTZ (Tab) HYZAAR 50-12.5 MG Take 1 Tab by mouth every day.        MetFORMIN HCl (Tab) GLUCOPHAGE 500 MG Take 500 mg by mouth 2 times a day, with meals.        Mupirocin Calcium (Cream) BACTROBAN 2 % Apply small amount to affected area bid for up to 10 days.        Oxycodone-Acetaminophen (Tab) PERCOCET-10  MG Take 1 Tab by mouth every four hours as needed for Severe Pain.        Potassium Chloride (Tab CR) KLOR-CON 8 MEQ TAKE 1 TABLET BY MOUTH ONCE DAILY        Rosuvastatin Calcium (Tab) CRESTOR 10 MG Take 1 Tab by mouth every evening.        Sennosides-Docusate Sodium (Tab) PERICOLACE or SENOKOT S 8.6-50 MG Take 1 Tab by mouth every day.         Tiotropium Bromide Monohydrate (Cap) SPIRIVA HANDIHALER 18 MCG INHALE THE CONTENTS OF 1 CAPSULE BY MOUTH VIA HANDIHALER ONCE DAILY        .                 Medicines prescribed today were sent to:     [facility] [address] [place] [address] [place] [place] - [address] [facility] [address]    [address] [place] [address]    Phone: [phone] Fax: [phone]    Open 24 Hours?: No      Medication refill instructions:       If your prescription bottle indicates you have medication refills left, it is not necessary to call your provider’s office. Please contact your pharmacy and they will refill your medication.    If your prescription bottle indicates you do not have any refills left, you may request refills at any time through one of the following ways: The online Curse system (except Urgent Care), by calling your provider’s office, or by asking your pharmacy to contact your provider’s office with a refill request. Medication refills are processed only during regular business hours and may not be available until the next business day. Your provider may request additional information or to have a follow-up visit with you prior to refilling your medication.   *Please Note: Medication refills are assigned a new Rx number when refilled electronically. Your pharmacy may indicate that no refills were authorized even though a new prescription for the same medication is available at the pharmacy. Please request the medicine by name with the pharmacy before contacting your provider for a refill.        Your To Do List     Future Labs/Procedures Complete By Expires    BASIC METABOLIC PANEL  As directed 8/22/2018         Curse Status: Patient Declined        Quit Tobacco Information     Do you want to quit using tobacco?    Quitting tobacco decreases risks of cancer, heart and lung disease, increases life expectancy, improves sense of taste and smell, and increases spending money, among other benefits.    If  you are thinking about quitting, we can help.  • [facility] Quit Tobacco Program: 879-967-8112  o Program occurs weekly for four weeks and includes pharmacist consultation on products to support quitting smoking or chewing tobacco. A provider referral is needed for pharmacist consultation.  • Tobacco Users Help Hotline: 5-800-QUIT-NOW (464-3979) or https://[place].quitlogix.org/  o Free, confidential telephone and online coaching for [place] residents. Sessions are designed on a schedule that is convenient for you. Eligible clients receive free nicotine replacement therapy.  • Nationally: www.smokefree.gov  o Information and professional assistance to support both immediate and long-term needs as you become, and remain, a non-smoker. Smokefree.gov allows you to choose the help that best fits your needs.

## 2017-08-23 ENCOUNTER — TELEPHONE (OUTPATIENT)
Dept: HEMATOLOGY ONCOLOGY | Facility: MEDICAL CENTER | Age: 77
End: 2017-08-23

## 2017-08-23 NOTE — TELEPHONE ENCOUNTER
Received referral to lung cancer screening program.  Chart review to assess for lung cancer screening program eligibility.   1. Age 55-77 yrs of age? Yes 77 y.o.  2. 30 pack year hx of smoking, or greater? Yes 1 ppdx 56 yrs=  55 pkyr hx  3. Current smoker or if quit, has pt quit within last 15 yrs?Yes    4. Any signs or symptoms of lung cancer? None noted  5. Previous history of lung cancer? None noted  6. Chest CT within past 12 mos.? None noted  Patient does meet eligibility criteria. [facility] scheduling notified to schedule the shared decision making visit.

## 2017-08-29 ENCOUNTER — OFFICE VISIT (OUTPATIENT)
Dept: VASCULAR LAB | Facility: MEDICAL CENTER | Age: 77
End: 2017-08-29
Attending: NURSE PRACTITIONER
Payer: MEDICARE

## 2017-08-29 ENCOUNTER — OFFICE VISIT (OUTPATIENT)
Dept: HEMATOLOGY ONCOLOGY | Facility: MEDICAL CENTER | Age: 77
End: 2017-08-29
Payer: MEDICARE

## 2017-08-29 VITALS
DIASTOLIC BLOOD PRESSURE: 60 MMHG | HEART RATE: 87 BPM | HEIGHT: 57 IN | OXYGEN SATURATION: 88 % | SYSTOLIC BLOOD PRESSURE: 92 MMHG | TEMPERATURE: 99 F | BODY MASS INDEX: 23.35 KG/M2 | WEIGHT: 108.25 LBS

## 2017-08-29 VITALS
DIASTOLIC BLOOD PRESSURE: 52 MMHG | HEART RATE: 100 BPM | WEIGHT: 106 LBS | BODY MASS INDEX: 24.53 KG/M2 | HEIGHT: 55 IN | SYSTOLIC BLOOD PRESSURE: 97 MMHG

## 2017-08-29 DIAGNOSIS — E89.0 HYPOTHYROIDISM, POSTRADIOIODINE THERAPY: ICD-10-CM

## 2017-08-29 DIAGNOSIS — E11.59 HYPERTENSION ASSOCIATED WITH DIABETES (HCC): ICD-10-CM

## 2017-08-29 DIAGNOSIS — I25.10 CORONARY ARTERY DISEASE DUE TO LIPID RICH PLAQUE: ICD-10-CM

## 2017-08-29 DIAGNOSIS — I25.83 CORONARY ARTERY DISEASE DUE TO LIPID RICH PLAQUE: ICD-10-CM

## 2017-08-29 DIAGNOSIS — I15.2 HYPERTENSION ASSOCIATED WITH DIABETES (HCC): ICD-10-CM

## 2017-08-29 DIAGNOSIS — F17.210 CIGARETTE SMOKER: ICD-10-CM

## 2017-08-29 PROCEDURE — 99212 OFFICE O/P EST SF 10 MIN: CPT | Performed by: NURSE PRACTITIONER

## 2017-08-29 PROCEDURE — 99213 OFFICE O/P EST LOW 20 MIN: CPT | Performed by: NURSE PRACTITIONER

## 2017-08-29 RX ORDER — LEVOTHYROXINE SODIUM 0.1 MG/1
88 TABLET ORAL
Qty: 30 TAB | Refills: 11 | Status: SHIPPED | OUTPATIENT
Start: 2017-08-29

## 2017-08-29 ASSESSMENT — ENCOUNTER SYMPTOMS
BLOOD IN STOOL: 0
COUGH: 1
BACK PAIN: 1
NECK PAIN: 1
WHEEZING: 1
BLURRED VISION: 0
SHORTNESS OF BREATH: 1
WEAKNESS: 1
MYALGIAS: 0
SEIZURES: 0
COUGH: 0
SPUTUM PRODUCTION: 1
FALLS: 1
BRUISES/BLEEDS EASILY: 1
CLAUDICATION: 0
HEADACHES: 0
PALPITATIONS: 0
WEIGHT LOSS: 0
SHORTNESS OF BREATH: 0
FOCAL WEAKNESS: 0
WEIGHT LOSS: 1
HEMOPTYSIS: 0
DEPRESSION: 0
LOSS OF CONSCIOUSNESS: 0

## 2017-08-29 ASSESSMENT — PAIN SCALES - GENERAL: PAINLEVEL: 9=SEVERE PAIN

## 2017-08-29 NOTE — PROGRESS NOTES
"  Follow Up VASCULAR VISIT  Subjective:   Yuliet Hammond is a 75 y.o. female who presents today 8-29-17 for   Chief Complaint   Patient presents with   • Follow-Up     HPI:  Patient here for follow-up of carotid artery disease, coronary artery disease, peripheral arterial disease, dyspnea, diabetes, hypertension, smoking.  No TIA or stroke symptoms  LE painful at times-- elevates as much as possible.  Some skin breakdown noted on LE from dogs scratching.  Leg swelling unchanged  No CP, SOB.  Taking crestor  BP at home 100s/40s-50s  On losartan hct  Takes furosemide as needed for leg swelling  Taking 1/2 tab of metformin 2x daily  Denies hypoglycemia  On ASA and pletal-- no bleeding.  Continues to smoke 1 ppd - not interested in quitting.    DIET AND EXERCISE:  Weight Change:stable  Diet: common adult  Exercise: doing PT exercises every day    Review of Systems   Constitutional: Positive for malaise/fatigue. Negative for weight loss.   Eyes: Negative for blurred vision.   Respiratory: Negative for cough and shortness of breath.    Cardiovascular: Negative for chest pain, palpitations, claudication and leg swelling.   Gastrointestinal: Negative for blood in stool.   Genitourinary: Negative for hematuria.   Musculoskeletal: Positive for back pain, falls, joint pain and neck pain. Negative for myalgias.   Neurological: Positive for weakness. Negative for focal weakness, seizures, loss of consciousness and headaches.   Endo/Heme/Allergies: Bruises/bleeds easily.   Psychiatric/Behavioral: Negative for depression.      Objective:     Vitals:    08/29/17 1034   BP: (!) 97/52   Pulse: 100   Weight: 48.1 kg (106 lb)   Height: 1.334 m (4' 4.5\")      Body mass index is 27.04 kg/m².  Physical Exam   Constitutional: She is oriented to person, place, and time. No distress.   Cardiovascular: Normal rate, regular rhythm and normal heart sounds.    No murmur heard.  Decreased but palpable pulses in both LE     Pulmonary/Chest: " Effort normal and breath sounds normal. No respiratory distress. She has no wheezes. She has no rales.   Musculoskeletal: She exhibits no edema.   Neurological: She is alert and oriented to person, place, and time. No cranial nerve deficit. Coordination normal.   Skin: Skin is warm and dry. She is not diaphoretic.   Psychiatric: She has a normal mood and affect.   Vitals reviewed.    Lab Results   Component Value Date    CHOLSTRLTOT 131 08/03/2017    CHOLSTRLTOT 128 08/03/2017    LDL 53 08/03/2017    HDL 69 08/03/2017    HDL 61 08/03/2017    TRIGLYCERIDE 76 08/03/2017    TRIGLYCERIDE 69 08/03/2017    LDLPART 510 08/03/2017    LDLPART 21.1 08/03/2017    SMLLDL <90 08/03/2017    LHDLPART 11.1 08/03/2017    LVLDLPT 1.3 08/03/2017    LDLCHOL 47 08/03/2017    HDLSIZE 10.4 08/03/2017    VLDLSIZE 48.4 (H) 08/03/2017    HDLPART 29.8 (L) 08/03/2017    LPIRSCORE <25 08/03/2017         Lab Results   Component Value Date    HBA1C 6.4 (H) 08/03/2017      Lab Results   Component Value Date    SODIUM 130 (L) 08/03/2017    POTASSIUM 3.8 08/03/2017    CHLORIDE 97 08/03/2017    CO2 25 08/03/2017    GLUCOSE 79 08/03/2017    BUN 13 08/03/2017    CREATININE 0.75 08/03/2017    IFAFRICA >60 08/03/2017    IFNOTAFR >60 08/03/2017        Lab Results   Component Value Date    WBC 8.9 08/03/2017    RBC 4.28 08/03/2017    HEMOGLOBIN 13.4 08/03/2017    HEMATOCRIT 40.0 08/03/2017    MCV 93.5 08/03/2017    MCH 31.3 08/03/2017    MCHC 33.5 (L) 08/03/2017    MPV 11.2 08/03/2017      Carotid duplex March 2016  Antegrade flow in both vertebrals. Moderate right internal carotid stenosis. Left carotid endarterectomy site however focal plaque seen at the proximal end of the surgical site appearing to cause a moderate stenosis. There is also some plaque distal to the surgical site which causes some degree of stenosis    CTA Neck: April 2016  Impression        1.  Extensive calcified plaque right carotid artery bifurcation resulting in 70% stenosis at the  origin the right internal carotid artery.  2.  Postoperative change left carotid artery. Ulcerated plaque but no significant stenosis identified.  3.  Marked stenosis at the origin of the left vertebral artery. Vertebral arteries otherwise are patent. No dissection.     LE Arterial Dulex sep 2016:  LES R 1.09 L 1.08   minimal aortic plaquing with no significant stenoses or aneurysmal dilatation  50% left common iliac artery stenosis  Minimal right common iliac stenosis    LE Arterial/LES 4/18/17:  Conclusions   1.  Normal resting bilateral lower extremity perfusion.    4/18/17:   Aorto-Iliac   Duplex Report     Vascular Laboratory   CONCLUSIONS   1.  No evidence of aortic aneurysm or stenosis.    2.  Possible moderate right common iliac artery stenosis, although doppler    angles appear inaccurate.   3.  No other evidence of iliac artery stenoses bilaterally.    Carotid Duplex 5/9/17:  CONCLUSIONS   Moderate stenosis of the right internal carotid (50-69%).    No stenosis left internal carotid post endarterectomy.    Medical Decision Making:  Today's Assessment / Status / Plan:     1. Hypothyroidism, postradioiodine therapy  levothyroxine (SYNTHROID) 100 MCG Tab    THYROID PANEL WITH TSH   2. Coronary artery disease due to lipid rich plaque  BTYPE NATRIURETIC PEPTIDE   3. Hypertension associated with diabetes (CMS-HCC)  BTYPE NATRIURETIC PEPTIDE    HEMOGLOBIN A1C    MICROALBUMIN CREAT RATIO URINE    BASIC METABOLIC PANEL     Patient Type: Secondary Prevention    Etiology of Established CVD if Present:   1. carotid disease status post carotid endarterectomy in 2016  2. peripheral arterial disease status post right iliac stent   3. coronary artery disease status post CABG currently without angina    Lipid Management: Qualifies for Statin Therapy Based on 2013 ACC/AHA Guidelines: yes  Calculated 10-Year Risk of ASCVD: N/A  Currently on Statin: Yes  Lp(a) normal  Indication for at least moderate intensity statin  Has  been intermittently adherent in past  Unable to afford Zetia  Plan:  - Continue crestor 10 mg daily   - Continue lifestyle modification  - Consider intensification of therapy if above goal  - Recheck lipid panel in 6-12 mths.    Blood Pressure Management:Goal: JNC8 (2013) Office BP Goal:<140/90; Under Control: yes  Blood pressure under excellent control both at home and in the office  BP has been slightly lower at home and in recent office visits.  She takes Lasix as needed.  Plan:  - Decrease losartan- HCT to 1/2 tab daily.  - Continue home blood pressure monitoring  - Recheck GFR electrolytes prior to next visit    Glycemic Status: Diabetic  A1c under excellent control  Plan:  - Continue 1/2 tab metformin to tablet twice daily  - Recheck A1c 6 months.  - Recheck urine for albumin  - Recommended yearly flu shot    Anti-Platelet/Anti-Coagulant Tx: yes  - Continue aspirin and cilostazol    Smoking: Recommended complete smoking cessation  Not a good candidate for Chantix or Wellbutrin  Not willing to go to smoking cessation classes  Not willing to quit at this time  Recommended e-cigarette or vaping as an alternative.  Plan:  - Willing to re-try vaping.  - Try to decrease # of cigarettes per day.     Physical Activity: As much walking as possible    Weight Management and Nutrition: Dietary plan was discussed with patient at this visit including better diabetic diet    Other:     1.  carotid disease status post recent carotid endarterectomy with some evidence of residual stenosis on carotid duplex and at least moderate contralateral stenosis  - Continue medical management as above  - Continue with yearly surveillance     2. peripheral arterial disease status post right iliac stent without lifestyle limiting claudication at present. Recent angiogram showed no significant target of intervention. No evidence of critical limb ischemia. She does have evidence of small vessel disease and perhaps some vasospasm.   - Continue  medical management as above.   - Continue with noninvasive surveillance.    - Call immediately if she begins to develop any skin breakdown or rest pain (patient verbalized understanding)    3. coronary artery disease status post CABG currently without angina - continue medical management as above. Re-check BNP.    4. Hypothyroidism-  TSH elevated.  C/o worsening fatigue.  Increase levothyroxine to 100mcg (once finished with 88mcg tabs).  Recheck TSH.  Defer further management to PCP    5. Hip pain - defer management to orthopedics and PCP    6. Affective disorder - defer management to PCP    Instructed to follow-up with PCP for remainder of adult medical needs: yes  We will partner with other providers in the management of established vascular disease and cardiometabolic risk factors.    Studies to Be Obtained:   1. Carotid duplex- April 2018  2.  aortoiliac duplex, lower extremity arterial duplex, and LES - in April 2018    Labs to Be Obtained: As above prior to next visit    Follow up in: 6 months per pt request     RAHUL Larson    CC:  MD Feliciano Wise MD John Hansen, MD

## 2017-08-29 NOTE — PROGRESS NOTES
"Subjective:      Mia Hammond is a 77 y.o. female who presents with Lung Cancer Screening Program Prescreen (Lung CA Int Screen/ Referred by Cindy Rueda/ Nicotine Dependent ) for lung cancer screening shared decision making visit.           HPI    Patient seen today for initial lung cancer screening visit. Patient referred by her PCP office, Dr. Rueda. PCP is JOHNNIE Cat.     After reviewing this screening criteria with the patient today, patient does have a history of lung cancer which was diagnosed in 2008. In 2008 in [place], [place] patient underwent a right upper lobectomy for lung cancer. Due to her previous history patient does not meet criteria to proceed with lung cancer screening.        Review of Systems   Constitutional: Positive for weight loss (intermittent up and down weight loss and weight gain).   Respiratory: Positive for cough, sputum production (mucous production), shortness of breath (with activity and sometimes at rest) and wheezing. Negative for hemoptysis.         She does have COPD          Objective:     BP (!) 92/60   Pulse 87   Temp 37.2 °C (99 °F)   Ht 1.448 m (4' 9\")   Wt 49.1 kg (108 lb 3.9 oz)   SpO2 88%   BMI 23.42 kg/m²      Physical Exam   Constitutional: She is oriented to person, place, and time. No distress.   Pulmonary/Chest: No respiratory distress. She has no wheezes.   Patient with diminished lung sounds in the right upper lobe. She does have some expiratory wheezing noted on the right lobe.   Neurological: She is alert and oriented to person, place, and time.   Skin: She is not diaphoretic. There is erythema (erythema and edema noted to bilateral lower extremities. According to the patient she has a cut on her left shin from her dog that is oozing clear drainage.).               Assessment/Plan:     1. Cigarette smoker         Patient does not meet the criteria to proceed with lung cancer screening due to her previous history of lung cancer in 2008. We " will not proceed with low-dose CT scan.    I will update patient's primary care provider and ordering provider with regards to the lung cancer screening program criteria.    I will also update patient's primary care provider with her lower extremity edema/erythema noted.

## 2017-08-31 RX ORDER — ARIPIPRAZOLE 5 MG/1
TABLET ORAL
Qty: 90 TAB | Refills: 1 | Status: SHIPPED | OUTPATIENT
Start: 2017-08-31

## 2017-10-04 ENCOUNTER — HOSPITAL ENCOUNTER (OUTPATIENT)
Dept: LAB | Facility: MEDICAL CENTER | Age: 77
End: 2017-10-04
Attending: NURSE PRACTITIONER
Payer: MEDICARE

## 2017-10-04 DIAGNOSIS — E11.69 HYPERLIPIDEMIA ASSOCIATED WITH TYPE 2 DIABETES MELLITUS (HCC): ICD-10-CM

## 2017-10-04 DIAGNOSIS — E87.1 HYPONATREMIA: ICD-10-CM

## 2017-10-04 DIAGNOSIS — E89.0 HYPOTHYROIDISM, POSTRADIOIODINE THERAPY: ICD-10-CM

## 2017-10-04 DIAGNOSIS — I25.10 CORONARY ARTERY DISEASE DUE TO LIPID RICH PLAQUE: ICD-10-CM

## 2017-10-04 DIAGNOSIS — E78.5 HYPERLIPIDEMIA ASSOCIATED WITH TYPE 2 DIABETES MELLITUS (HCC): ICD-10-CM

## 2017-10-04 DIAGNOSIS — I25.83 CORONARY ARTERY DISEASE DUE TO LIPID RICH PLAQUE: ICD-10-CM

## 2017-10-04 DIAGNOSIS — E11.59 HYPERTENSION ASSOCIATED WITH DIABETES (HCC): ICD-10-CM

## 2017-10-04 DIAGNOSIS — I15.2 HYPERTENSION ASSOCIATED WITH DIABETES (HCC): ICD-10-CM

## 2017-10-04 LAB
ANION GAP SERPL CALC-SCNC: 9 MMOL/L (ref 0–11.9)
BNP SERPL-MCNC: 13 PG/ML (ref 0–100)
BUN SERPL-MCNC: 15 MG/DL (ref 8–22)
CALCIUM SERPL-MCNC: 9.7 MG/DL (ref 8.5–10.5)
CHLORIDE SERPL-SCNC: 98 MMOL/L (ref 96–112)
CHOLEST SERPL-MCNC: 132 MG/DL (ref 100–199)
CO2 SERPL-SCNC: 26 MMOL/L (ref 20–33)
CREAT SERPL-MCNC: 0.69 MG/DL (ref 0.5–1.4)
CREAT UR-MCNC: 69.3 MG/DL
GFR SERPL CREATININE-BSD FRML MDRD: >60 ML/MIN/1.73 M 2
GLUCOSE SERPL-MCNC: 95 MG/DL (ref 65–99)
HDLC SERPL-MCNC: 59 MG/DL
LDLC SERPL CALC-MCNC: 56 MG/DL
MICROALBUMIN UR-MCNC: 4.9 MG/DL
MICROALBUMIN/CREAT UR: 71 MG/G (ref 0–30)
POTASSIUM SERPL-SCNC: 3.8 MMOL/L (ref 3.6–5.5)
SODIUM SERPL-SCNC: 133 MMOL/L (ref 135–145)
T4 FREE SERPL-MCNC: 1.58 NG/DL (ref 0.53–1.43)
TRIGL SERPL-MCNC: 83 MG/DL (ref 0–149)
TSH SERPL DL<=0.005 MIU/L-ACNC: 0.64 UIU/ML (ref 0.3–3.7)

## 2017-10-04 PROCEDURE — 83880 ASSAY OF NATRIURETIC PEPTIDE: CPT | Mod: GA

## 2017-10-04 PROCEDURE — 84439 ASSAY OF FREE THYROXINE: CPT

## 2017-10-04 PROCEDURE — 80061 LIPID PANEL: CPT

## 2017-10-04 PROCEDURE — 82043 UR ALBUMIN QUANTITATIVE: CPT

## 2017-10-04 PROCEDURE — 80048 BASIC METABOLIC PNL TOTAL CA: CPT

## 2017-10-04 PROCEDURE — 36415 COLL VENOUS BLD VENIPUNCTURE: CPT

## 2017-10-04 PROCEDURE — 84443 ASSAY THYROID STIM HORMONE: CPT

## 2017-10-04 PROCEDURE — 82570 ASSAY OF URINE CREATININE: CPT

## 2017-10-05 ENCOUNTER — TELEPHONE (OUTPATIENT)
Dept: MEDICAL GROUP | Facility: PHYSICIAN GROUP | Age: 77
End: 2017-10-05

## 2017-10-05 NOTE — TELEPHONE ENCOUNTER
----- Message from Cindy Verduzco D.O. sent at 10/5/2017  9:29 AM PDT -----  Please call pt to inform them that the results from recent testing show improvement in sodium level.    -Dr. Rueda

## 2017-10-11 NOTE — TELEPHONE ENCOUNTER
Was the patient seen in the last year in this department? Yes     Does patient have an active prescription for medications requested? No     Received Request Via: Pharmacy      Pt met protocol?: Yes pt last ov 7/17   Lab Results   Component Value Date/Time    HBA1C 6.4 (H) 08/03/2017 12:26 PM

## 2017-10-12 ENCOUNTER — OFFICE VISIT (OUTPATIENT)
Dept: BEHAVIORAL HEALTH | Facility: PHYSICIAN GROUP | Age: 77
End: 2017-10-12
Payer: MEDICARE

## 2017-10-12 VITALS
RESPIRATION RATE: 16 BRPM | TEMPERATURE: 98.2 F | WEIGHT: 106 LBS | HEART RATE: 88 BPM | BODY MASS INDEX: 22.87 KG/M2 | DIASTOLIC BLOOD PRESSURE: 62 MMHG | SYSTOLIC BLOOD PRESSURE: 94 MMHG | HEIGHT: 57 IN

## 2017-10-12 DIAGNOSIS — G89.4 CHRONIC PAIN SYNDROME: ICD-10-CM

## 2017-10-12 DIAGNOSIS — F31.9 BIPOLAR 1 DISORDER, DEPRESSED (HCC): ICD-10-CM

## 2017-10-12 PROCEDURE — 99213 OFFICE O/P EST LOW 20 MIN: CPT | Performed by: PSYCHIATRY & NEUROLOGY

## 2017-10-12 PROCEDURE — 90833 PSYTX W PT W E/M 30 MIN: CPT | Performed by: PSYCHIATRY & NEUROLOGY

## 2017-10-12 ASSESSMENT — PATIENT HEALTH QUESTIONNAIRE - PHQ9
5. POOR APPETITE OR OVEREATING: 1
1. LITTLE INTEREST OR PLEASURE IN DOING THINGS: 1
4. FEELING TIRED OR HAVING LITTLE ENERGY: 1
6. FEELING BAD ABOUT YOURSELF - OR THAT YOU ARE A FAILURE OR HAVE LET YOURSELF OR YOUR FAMILY DOWN: 0
2. FEELING DOWN, DEPRESSED, IRRITABLE, OR HOPELESS: 1
8. MOVING OR SPEAKING SO SLOWLY THAT OTHER PEOPLE COULD HAVE NOTICED. OR THE OPPOSITE, BEING SO FIGETY OR RESTLESS THAT YOU HAVE BEEN MOVING AROUND A LOT MORE THAN USUAL: 1
SUM OF ALL RESPONSES TO PHQ QUESTIONS 1-9: 6
3. TROUBLE FALLING OR STAYING ASLEEP OR SLEEPING TOO MUCH: 0
SUM OF ALL RESPONSES TO PHQ9 QUESTIONS 1 AND 2: 2
7. TROUBLE CONCENTRATING ON THINGS, SUCH AS READING THE NEWSPAPER OR WATCHING TELEVISION: 1
9. THOUGHTS THAT YOU WOULD BE BETTER OFF DEAD, OR OF HURTING YOURSELF: 0

## 2017-10-12 NOTE — TELEPHONE ENCOUNTER
Patient has recently been seen by PCP within the last 6 months per protocol (/17). Will refill medications for 12 months for DM meds.  Lab Results   Component Value Date/Time    HBA1C 6.4 (H) 08/03/2017 12:26 PM      Lab Results   Component Value Date/Time    MALBCRT 71 (H) 10/04/2017 10:24 AM    MICROALBUR 4.9 10/04/2017 10:24 AM      Lab Results   Component Value Date/Time    ALKPHOSPHAT 88 08/03/2017 12:26 PM    ASTSGOT 18 08/03/2017 12:26 PM    ALTSGPT 10 08/03/2017 12:26 PM    TBILIRUBIN 0.6 08/03/2017 12:26 PM
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
## 2017-12-19 ENCOUNTER — APPOINTMENT (OUTPATIENT)
Dept: MEDICAL GROUP | Facility: PHYSICIAN GROUP | Age: 77
End: 2017-12-19
Payer: MEDICARE

## 2017-12-19 ENCOUNTER — OFFICE VISIT (OUTPATIENT)
Dept: BEHAVIORAL HEALTH | Facility: PHYSICIAN GROUP | Age: 77
End: 2017-12-19
Payer: MEDICARE

## 2017-12-19 ENCOUNTER — HOSPITAL ENCOUNTER (OUTPATIENT)
Dept: LAB | Facility: MEDICAL CENTER | Age: 77
End: 2017-12-19
Attending: NURSE PRACTITIONER
Payer: MEDICARE

## 2017-12-19 ENCOUNTER — OFFICE VISIT (OUTPATIENT)
Dept: MEDICAL GROUP | Facility: PHYSICIAN GROUP | Age: 77
End: 2017-12-19
Payer: MEDICARE

## 2017-12-19 VITALS
DIASTOLIC BLOOD PRESSURE: 58 MMHG | OXYGEN SATURATION: 92 % | BODY MASS INDEX: 23.95 KG/M2 | WEIGHT: 111 LBS | TEMPERATURE: 98.6 F | SYSTOLIC BLOOD PRESSURE: 100 MMHG | HEIGHT: 57 IN | HEART RATE: 100 BPM

## 2017-12-19 VITALS
SYSTOLIC BLOOD PRESSURE: 100 MMHG | DIASTOLIC BLOOD PRESSURE: 58 MMHG | TEMPERATURE: 98.4 F | BODY MASS INDEX: 23.95 KG/M2 | RESPIRATION RATE: 18 BRPM | WEIGHT: 111 LBS | HEART RATE: 98 BPM | HEIGHT: 57 IN

## 2017-12-19 DIAGNOSIS — E89.0 HYPOTHYROIDISM, POSTRADIOIODINE THERAPY: ICD-10-CM

## 2017-12-19 DIAGNOSIS — Z23 NEED FOR VACCINATION: ICD-10-CM

## 2017-12-19 DIAGNOSIS — G89.4 CHRONIC PAIN SYNDROME: ICD-10-CM

## 2017-12-19 DIAGNOSIS — E11.8 CONTROLLED TYPE 2 DIABETES MELLITUS WITH COMPLICATION, WITHOUT LONG-TERM CURRENT USE OF INSULIN (HCC): ICD-10-CM

## 2017-12-19 DIAGNOSIS — F31.9 BIPOLAR 1 DISORDER, DEPRESSED (HCC): ICD-10-CM

## 2017-12-19 DIAGNOSIS — R60.0 BILATERAL LOWER EXTREMITY EDEMA: ICD-10-CM

## 2017-12-19 LAB
EST. AVERAGE GLUCOSE BLD GHB EST-MCNC: 148 MG/DL
HBA1C MFR BLD: 6.8 % (ref 0–5.6)
TSH SERPL DL<=0.005 MIU/L-ACNC: 2.85 UIU/ML (ref 0.38–5.33)

## 2017-12-19 PROCEDURE — 99214 OFFICE O/P EST MOD 30 MIN: CPT | Mod: 25 | Performed by: NURSE PRACTITIONER

## 2017-12-19 PROCEDURE — G0008 ADMIN INFLUENZA VIRUS VAC: HCPCS | Performed by: NURSE PRACTITIONER

## 2017-12-19 PROCEDURE — 83036 HEMOGLOBIN GLYCOSYLATED A1C: CPT | Mod: GA

## 2017-12-19 PROCEDURE — 84443 ASSAY THYROID STIM HORMONE: CPT

## 2017-12-19 PROCEDURE — 90833 PSYTX W PT W E/M 30 MIN: CPT | Performed by: PSYCHIATRY & NEUROLOGY

## 2017-12-19 PROCEDURE — 99213 OFFICE O/P EST LOW 20 MIN: CPT | Performed by: PSYCHIATRY & NEUROLOGY

## 2017-12-19 PROCEDURE — 90662 IIV NO PRSV INCREASED AG IM: CPT | Performed by: NURSE PRACTITIONER

## 2017-12-19 PROCEDURE — 36415 COLL VENOUS BLD VENIPUNCTURE: CPT

## 2017-12-19 ASSESSMENT — PATIENT HEALTH QUESTIONNAIRE - PHQ9
SUM OF ALL RESPONSES TO PHQ9 QUESTIONS 1 AND 2: 4
2. FEELING DOWN, DEPRESSED, IRRITABLE, OR HOPELESS: 2
5. POOR APPETITE OR OVEREATING: 1
6. FEELING BAD ABOUT YOURSELF - OR THAT YOU ARE A FAILURE OR HAVE LET YOURSELF OR YOUR FAMILY DOWN: 1
8. MOVING OR SPEAKING SO SLOWLY THAT OTHER PEOPLE COULD HAVE NOTICED. OR THE OPPOSITE, BEING SO FIGETY OR RESTLESS THAT YOU HAVE BEEN MOVING AROUND A LOT MORE THAN USUAL: 0
SUM OF ALL RESPONSES TO PHQ QUESTIONS 1-9: 8
1. LITTLE INTEREST OR PLEASURE IN DOING THINGS: 2
4. FEELING TIRED OR HAVING LITTLE ENERGY: 1
3. TROUBLE FALLING OR STAYING ASLEEP OR SLEEPING TOO MUCH: 1
7. TROUBLE CONCENTRATING ON THINGS, SUCH AS READING THE NEWSPAPER OR WATCHING TELEVISION: 0
9. THOUGHTS THAT YOU WOULD BE BETTER OFF DEAD, OR OF HURTING YOURSELF: 0

## 2017-12-19 NOTE — ASSESSMENT & PLAN NOTE
She had been taking levothyroxine which was increased to 100 mcg in the morning prior to additional medications. Recent TSH in low normal range.

## 2017-12-19 NOTE — LETTER
2017        Mia Hammond   1940.  MR # [id number].        To whom It May Concern:    Mia Hammond has been under my care at [facility] and she has been suffering from depression and anxiety. It is therapeutic that her  dog lives with her.          Sincerely.        Jean Marie Solis MD  Staff psychiatrist.

## 2017-12-19 NOTE — PROGRESS NOTES
Subjective:     Chief Complaint   Patient presents with   • Follow-Up     [facility] Multi chronic issues      Mia Hammond is a 77 y.o. female here today for evaluation and management of issues listed below.    Hypothyroidism, postradioiodine therapy  She had been taking levothyroxine which was increased to 100 mcg in the morning prior to additional medications. Recent TSH in low normal range.    Diabetes mellitus type 2, controlled (CMS-HCC)  She continues metformin twice a day. She is also on an ACE inhibitor and statin medication. Previous A1c 6.4 in August. She will visit lab today.   She takes Lasix 40 mg when necessary for bilateral lower extremity edema. It's been well controlled recently she has not had any medication lately. She does take potassium with this.  1. Controlled type 2 diabetes mellitus with complication, without long-term current use of insulin (CMS-HCC)    2. Hypothyroidism, postradioiodine therapy    3. Bilateral lower extremity edema    4. Need for vaccination        ROS   Denies HA, chest pain, increased shortness of breath, abdominal pain, bladder or bowel changes, lower extremity edema. All other pertinent systems reviewed and are negative except as in HPI.    Allergies: Codeine; Latex; Sulfa drugs; and Tape  Current medicines (including changes today)  Current Outpatient Prescriptions   Medication Sig Dispense Refill   • cilostazol (PLETAL) 100 MG Tab TAKE 1 TABLET BY MOUTH TWICE DAILY(LEG PAIN) 90 Tab 1   • aripiprazole (ABILIFY) 5 MG tablet TAKE 1 TABLET BY MOUTH ONCE DAILY IN THE MORNING 90 Tab 1   • levothyroxine (SYNTHROID) 100 MCG Tab Take 1 Tab by mouth Every morning on an empty stomach. 30 Tab 11   • losartan-hydrochlorothiazide (HYZAAR) 50-12.5 MG per tablet Take 1 Tab by mouth every day. 90 Tab 1   • metformin (GLUCOPHAGE) 500 MG Tab Take 500 mg by mouth 2 times a day, with meals.     • duloxetine (CYMBALTA) 60 MG Cap DR Particles delayed-release capsule TAKE TWO CAPSULES BY  MOUTH ONCE AT BEDTIME(GENERALIZED ANXIETY DISORDER, MAJOR DEPRESSIVE DISORDER) 180 Cap 1   • lamotrigine (LAMICTAL) 200 MG tablet Take 1 Tab by mouth 2 times a day. 60 Tab 5   • rosuvastatin (CRESTOR) 10 MG Tab Take 1 Tab by mouth every evening. 30 Tab 11   • Oxycodone-Acetaminophen (PERCOCET-10)  MG Tab Take 1 Tab by mouth every four hours as needed for Severe Pain.     • Blood Glucose Monitoring Suppl Supplies Misc Test strips order: Test strips for one touch ultra meter.Sig: use once daily and prn ssx high or low sugar #100 RF x 3 DX: E11 100 Each 3   • furosemide (LASIX) 40 MG Tab Take 1 Tab by mouth every day. 30 Tab 2   • fluticasone (FLONASE) 50 MCG/ACT nasal spray SHAKE LIQUID AND USE 1 SPRAY IN EACH NOSTRIL TWICE DAILY 1 Bottle 1   • lorazepam (ATIVAN) 0.5 MG Tab TAKE 1 TABLET BY MOUTH EVERY 12 HOURS AS NEEDED FOR ANXIETY 30 Tab 0   • SPIRIVA HANDIHALER 18 MCG Cap INHALE THE CONTENTS OF 1 CAPSULE BY MOUTH VIA HANDIHALER ONCE DAILY 30 Cap 2   • mupirocin calcium (BACTROBAN) 2 % Cream Apply small amount to affected area bid for up to 10 days. 1 Tube 0   • albuterol 108 (90 BASE) MCG/ACT Aero Soln inhalation aerosol Inhale 1-2 Puffs by mouth every 6 hours as needed. Give albuterol that is patient or insurance preference please 8.5 g 1   • KLOR-CON 8 MEQ tablet TAKE 1 TABLET BY MOUTH ONCE DAILY 90 Tab 1   • senna-docusate (PERICOLACE OR SENOKOT S) 8.6-50 MG Tab Take 1 Tab by mouth every day.     • baclofen (LIORESAL) 10 MG TABS Take 10 mg by mouth 2 times a day.     • Diclofenac Sodium (VOLTAREN) 1 % GEL Apply thin layer to affected area tid, prn. 1 Tube 1   • aspirin 81 MG tablet Take 81 mg by mouth every day.       No current facility-administered medications for this visit.        She  has a past medical history of Anxiety; Arthritis; Asthma; Cancer (CMS-HCC) (2008); Cataract; COPD (chronic obstructive pulmonary disease) (CMS-HCC); Dental disorder; Depression (7/27/2010); Diabetes; Encounter for  "long-term (current) use of other medications; Hyperlipidemia (7/27/2010); Hypertension; Muscle disorder; PAD (peripheral artery disease) (CMS-HCC); Pain (12-10-15); S/P CABG (coronary artery bypass graft) (7/27/2010); Scoliosis; Thyroid disease; Tobacco dependence; Urinary bladder disorder; and Urinary incontinence.      Health Maintenance: Reviewed and updated.      Objective:   Blood pressure 100/58, pulse 100, temperature 37 °C (98.6 °F), height 1.448 m (4' 9\"), weight 50.3 kg (111 lb), SpO2 92 %. Body mass index is 24.02 kg/m².  Physical Exam   Alert, no acute distress. Pleasant and cooperative with the examination.  Eye contact is good, affect calm.  Skin: Warm, dry, no rashes in visible areas.    Eyes: Pupils equal, round. Conjunctiva and sclera clear, lids normal.  ENT: Pinna normal.  Neck: Supple, trachea midline.  Lungs: Normal effort and respirations.Diminished sounds to auscultation bilaterally.   CV: Regular rate and rhythm.  MS/Ext: Steady gait, no edema.    Results reviewedLabs.    Assessment and Plan:   Treatment:    Mia was seen today for follow-up.    Diagnoses and all orders for this visit:    Controlled type 2 diabetes mellitus with complication, without long-term current use of insulin (CMS-HCC)  Comments:  Has been stable. She'll visit the lab today and continue current medications. She'll be notified of any recommended changes after labs are reviewed.  Orders:  -     HEMOGLOBIN A1C; Future    Hypothyroidism, postradioiodine therapy  Comments:  She'll have labs done today and continue current medications. She'll be notified of any recommended changes. We'll continue to monitor.  Orders:  -     TSH WITH REFLEX TO FT4; Future    Bilateral lower extremity edema  Comments:  Stable. Continue Lasix with potassium as needed. We'll continue to monitor.    Need for vaccination  Comments:  Given in office today.  Orders:  -     INFLUENZA VACCINE, HIGH DOSE (65+ ONLY)    The patient was counseled on the " requirements, possible side effects, and future requirements of immunizations for today.  Immunizations given by MA after completion of vaccine screening checklist, under the direction of Dr. Cindy Rueda.      Followup: Return in about 3-4  months Sooner should new symptoms or problems arise, or as previously scheduled.         Reviewed side effects, risks, and benefits of medications prescribed today.  Advised to take all medications as instructed and report any side effects.   The patient voices understanding and agrees.  Report any new or worsening symptoms.  Have labs or other diagnostic studies prior to follow up.  Keep all appointments for any referrals given.        Please note this dictation was created using voice recognition software. Every reasonable attempt has been made to correct obvious errors, however there may be errors of grammar and possibly content that were not discovered before finalizing the note.

## 2017-12-19 NOTE — ASSESSMENT & PLAN NOTE
She continues metformin twice a day. She is also on an ACE inhibitor and statin medication. Previous A1c 6.4 in August. She will visit lab today.

## 2017-12-20 RX ORDER — FLUTICASONE PROPIONATE 50 MCG
SPRAY, SUSPENSION (ML) NASAL
Qty: 3 BOTTLE | Refills: 3 | Status: SHIPPED | OUTPATIENT
Start: 2017-12-20

## 2017-12-20 NOTE — PROGRESS NOTES
"[facility]  PSYCHIATRIC FOLLOW-UP NOTE    Name: Mia Hammond  MRN: [id number]  : 1940  Age: 77 y.o.  Date of assessment: 2017  PCP: SIOBHAN Cao  Persons in attendance: Patient  REASON FOR VISIT/CHIEF COMPLAINT (as stated by Patient):  Mia Hammond is a 77 y.o., White female, attending follow-up appointment for   Chief Complaint   Patient presents with   • Medication Management     I have been feeling down and depressed after decreasing the dose cymbalta.   .      HISTORY OF PRESENT ILLNESS:    This is a 78 yo female, lives with grand daughter, seen today for follow up. The patient reported that she has been feeling down all the time, decreased motivation, and sadness for no reason. The patients grand daughter is pregnant and lives with her. She has no place to go. The patients son gave her a ride today and she has a daughter who is sixty years old in [place]. The patient complained of chronic pain in her hip and back.           PSYCHOSOCIAL CHANGES SINCE PREVIOUS CONTACT:  Depressed all the time after decreasing the dose of cymbalta.    RESPONSE TO TREATMENT:  She is taking lamotrigine 200 mg BID, Cymbalta 60 mg one a day, and abilify 5 mg per day.    MEDICATION SIDE EFFECTS:  Denied.    REVIEW OF SYSTEMS:        Constitutional positive - risk of fall.   Eyes negative   Ears/Nose/Mouth/Throat negative   Cardiovascular positive - hypertension, coronary artery disease, peripheral arterial disease.    Respiratory positive - history of lung disease, Copd.   Gastrointestinal negative   Genitourinary negative   Muscular negative   Integumentary negative   Neurological negative   Endocrine positive - hyperlipidemia, diabetes, hypothyroidism   Hematologic/Lymphatic negative       PSYCHIATRIC EXAMINATION/MENTAL STATUS  /58   Pulse 98   Temp 36.9 °C (98.4 °F)   Resp 18   Ht 1.448 m (4' 9.01\")   Wt 50.3 kg (111 lb)   BMI 24.01 kg/m²   Participation: Active verbal " participation, Attentive and Engaged  Grooming:Neat  Orientation: Alert and Fully Oriented  Eye contact: Good  Behavior:Calm   Mood: Depressed  Affect: Sad and Anxious  Thought process: Logical and Goal-directed  Thought content:  Within normal limits  Speech: Rate within normal limits and Volume within normal limits  Perception:  Within normal limits  Memory:  Poor memory for chronology of events  Insight: Good  Judgment: Good  Family/couple interaction observations:   Other:    Current risk:    Suicide: Low   Homicide: Low   Self-harm: Low  Relapse: Low  Other:   Crisis Safety Plan reviewed?Yes  If evidence of imminent risk is present, intervention/plan:    Medical Records/Labs/Diagnostic Tests Reviewed: yes.    Medical Records/Labs/Diagnostic Tests Ordered: none.    DIAGNOSTIC IMPRESSION(S):  1. Bipolar 1 disorder, depressed (CMS-HCC)    2. Chronic pain syndrome           ASSESSMENT AND PLAN:  1. Bipolar 1 disorder, depressed.  Increase duloxetine 60 mg BID  Lamotrigine 200 mg BID  Abilify 5 mg one a day.    2. Follow up in three months.  Letter for her therapy dog.    16 minutes were spent in psychotherapy.  (If greater than 16 minutes, add 81248 code)   Topics addressed in psychotherapy include:  The patient is keeping her daily routine and sleep cycle.  Good family support  Using her coping skills to work through daily stress.  Jean Marie Solis M.D.

## 2017-12-20 NOTE — TELEPHONE ENCOUNTER
Was the patient seen in the last year in this department? Yes     Does patient have an active prescription for medications requested? No     Received Request Via: Pharmacy      Pt met protocol?: Yes, OV today (12/19)

## 2017-12-26 RX ORDER — DULOXETIN HYDROCHLORIDE 60 MG/1
CAPSULE, DELAYED RELEASE ORAL
Qty: 180 CAP | Refills: 1 | Status: SHIPPED | OUTPATIENT
Start: 2017-12-26 | End: 2018-03-20

## 2017-12-27 DIAGNOSIS — I73.9 PAD (PERIPHERAL ARTERY DISEASE) (HCC): ICD-10-CM

## 2017-12-27 RX ORDER — LAMOTRIGINE 200 MG/1
TABLET ORAL
Qty: 60 TAB | Refills: 5 | Status: SHIPPED | OUTPATIENT
Start: 2017-12-27

## 2017-12-28 RX ORDER — CILOSTAZOL 100 MG/1
TABLET ORAL
Qty: 90 TAB | Refills: 2 | Status: ON HOLD | OUTPATIENT
Start: 2017-12-28 | End: 2018-03-29

## 2018-01-02 ENCOUNTER — OFFICE VISIT (OUTPATIENT)
Dept: VASCULAR LAB | Facility: MEDICAL CENTER | Age: 78
End: 2018-01-02
Attending: NURSE PRACTITIONER
Payer: MEDICARE

## 2018-01-02 VITALS
WEIGHT: 108 LBS | BODY MASS INDEX: 23.3 KG/M2 | SYSTOLIC BLOOD PRESSURE: 137 MMHG | HEART RATE: 93 BPM | DIASTOLIC BLOOD PRESSURE: 57 MMHG | HEIGHT: 57 IN

## 2018-01-02 DIAGNOSIS — E11.59 HYPERTENSION ASSOCIATED WITH DIABETES (HCC): ICD-10-CM

## 2018-01-02 DIAGNOSIS — I15.2 HYPERTENSION ASSOCIATED WITH DIABETES (HCC): ICD-10-CM

## 2018-01-02 DIAGNOSIS — I73.9 PERIPHERAL ARTERIAL DISEASE (HCC): ICD-10-CM

## 2018-01-02 DIAGNOSIS — I25.10 CORONARY ARTERY DISEASE DUE TO LIPID RICH PLAQUE: ICD-10-CM

## 2018-01-02 DIAGNOSIS — I25.83 CORONARY ARTERY DISEASE DUE TO LIPID RICH PLAQUE: ICD-10-CM

## 2018-01-02 PROCEDURE — 99213 OFFICE O/P EST LOW 20 MIN: CPT | Performed by: NURSE PRACTITIONER

## 2018-01-02 PROCEDURE — 99212 OFFICE O/P EST SF 10 MIN: CPT | Performed by: NURSE PRACTITIONER

## 2018-01-02 ASSESSMENT — ENCOUNTER SYMPTOMS
BLURRED VISION: 0
BACK PAIN: 1
WEIGHT LOSS: 0
NECK PAIN: 1
MYALGIAS: 0
BRUISES/BLEEDS EASILY: 1
BLOOD IN STOOL: 0
COUGH: 0
CLAUDICATION: 0
FALLS: 1
LOSS OF CONSCIOUSNESS: 0
DEPRESSION: 0
SEIZURES: 0
PALPITATIONS: 0
HEADACHES: 0
WEAKNESS: 1
FOCAL WEAKNESS: 0
SHORTNESS OF BREATH: 0

## 2018-01-03 NOTE — PROGRESS NOTES
"  Follow Up VASCULAR VISIT  Subjective:   Yuliet Hammond is a 75 y.o. female who presents today 1-2-17 for   Chief Complaint   Patient presents with   • Follow-Up     HPI:  Patient here for follow-up of carotid artery disease, coronary artery disease, peripheral arterial disease, dyspnea, diabetes, hypertension, smoking.  No TIA or stroke symptoms  [facility]ent [facility]-- they want to do varicose vein injection  No LE breakdown  Able to walk about 1/2 block  Leg swelling has been better-- has not been taking Lasix very often  No CP or palpitations.  Has SOB but is unchanged.  Taking crestor  Taking 1/2 tab of metformin 2x daily  Denies hypoglycemia  On ASA and pletal-- no bleeding.  Continues to smoke 1 ppd - not interested in quitting-- trying to decrease # of cigarettes per day.    DIET AND EXERCISE:  Weight Change:stable  Diet: common adult  Exercise: doing PT exercises every day    Review of Systems   Constitutional: Positive for malaise/fatigue. Negative for weight loss.   Eyes: Negative for blurred vision.   Respiratory: Negative for cough and shortness of breath.    Cardiovascular: Negative for chest pain, palpitations, claudication and leg swelling.   Gastrointestinal: Negative for blood in stool.   Genitourinary: Negative for hematuria.   Musculoskeletal: Positive for back pain, falls, joint pain and neck pain. Negative for myalgias.   Neurological: Positive for weakness. Negative for focal weakness, seizures, loss of consciousness and headaches.   Endo/Heme/Allergies: Bruises/bleeds easily.   Psychiatric/Behavioral: Negative for depression.      Objective:     Vitals:    01/02/18 1556   BP: 137/57   Pulse: 100   Weight: 49 kg (108 lb)   Height: 1.448 m (4' 9\")      Body mass index is 23.37 kg/m².  Physical Exam   Constitutional: She is oriented to person, place, and time. No distress.   Cardiovascular: Normal rate, regular rhythm and normal heart sounds.    No murmur heard.  Pulses:       Dorsalis pedis " pulses are 1+ on the right side, and 0 on the left side.   Decreased pulse on RLE  Unable to palpate LLE pulse, warm   Pulmonary/Chest: Effort normal and breath sounds normal. No respiratory distress. She has no wheezes. She has no rales.   Musculoskeletal: She exhibits no edema.   Bilat LE feet discoloration   Neurological: She is alert and oriented to person, place, and time. No cranial nerve deficit. Coordination normal.   Skin: Skin is warm and dry. She is not diaphoretic.   Psychiatric: She has a normal mood and affect.   Vitals reviewed.    Lab Results   Component Value Date    CHOLSTRLTOT 132 10/04/2017    LDL 56 10/04/2017    HDL 59 10/04/2017    TRIGLYCERIDE 83 10/04/2017    LDLPART 510 08/03/2017    LDLPART 21.1 08/03/2017    SMLLDL <90 08/03/2017    LHDLPART 11.1 08/03/2017    LVLDLPT 1.3 08/03/2017    LDLCHOL 47 08/03/2017    HDLSIZE 10.4 08/03/2017    VLDLSIZE 48.4 (H) 08/03/2017    HDLPART 29.8 (L) 08/03/2017    LPIRSCORE <25 08/03/2017         Lab Results   Component Value Date    HBA1C 6.8 (H) 12/19/2017      Lab Results   Component Value Date    SODIUM 133 (L) 10/04/2017    POTASSIUM 3.8 10/04/2017    CHLORIDE 98 10/04/2017    CO2 26 10/04/2017    GLUCOSE 95 10/04/2017    BUN 15 10/04/2017    CREATININE 0.69 10/04/2017    IFAFRICA >60 10/04/2017    IFNOTAFR >60 10/04/2017        Lab Results   Component Value Date    WBC 8.9 08/03/2017    RBC 4.28 08/03/2017    HEMOGLOBIN 13.4 08/03/2017    HEMATOCRIT 40.0 08/03/2017    MCV 93.5 08/03/2017    MCH 31.3 08/03/2017    MCHC 33.5 (L) 08/03/2017    MPV 11.2 08/03/2017      Carotid duplex March 2016  Antegrade flow in both vertebrals. Moderate right internal carotid stenosis. Left carotid endarterectomy site however focal plaque seen at the proximal end of the surgical site appearing to cause a moderate stenosis. There is also some plaque distal to the surgical site which causes some degree of stenosis    CTA Neck: April 2016  Impression        1.   Extensive calcified plaque right carotid artery bifurcation resulting in 70% stenosis at the origin the right internal carotid artery.  2.  Postoperative change left carotid artery. Ulcerated plaque but no significant stenosis identified.  3.  Marked stenosis at the origin of the left vertebral artery. Vertebral arteries otherwise are patent. No dissection.     LE Arterial Dulex sep 2016:  LES R 1.09 L 1.08   minimal aortic plaquing with no significant stenoses or aneurysmal dilatation  50% left common iliac artery stenosis  Minimal right common iliac stenosis    LE Arterial/LES 4/18/17:  Conclusions   1.  Normal resting bilateral lower extremity perfusion.    4/18/17:   Aorto-Iliac   Duplex Report     Vascular Laboratory   CONCLUSIONS   1.  No evidence of aortic aneurysm or stenosis.    2.  Possible moderate right common iliac artery stenosis, although doppler    angles appear inaccurate.   3.  No other evidence of iliac artery stenoses bilaterally.    Carotid Duplex 5/9/17:  CONCLUSIONS   Moderate stenosis of the right internal carotid (50-69%).    No stenosis left internal carotid post endarterectomy.    Medical Decision Making:  Today's Assessment / Status / Plan:     1. Hypertension associated with diabetes (CMS-HCC)     2. Coronary artery disease due to lipid rich plaque     3. Peripheral arterial disease (CMS-HCC)       Patient Type: Secondary Prevention    Etiology of Established CVD if Present:   1. carotid disease status post carotid endarterectomy in 2016  2. peripheral arterial disease status post right iliac stent   3. coronary artery disease status post CABG currently without angina    Lipid Management: Qualifies for Statin Therapy Based on 2013 ACC/AHA Guidelines: yes  Calculated 10-Year Risk of ASCVD: N/A  Currently on Statin: Yes  Lp(a) normal  Indication for at least moderate intensity statin  Has been intermittently adherent in past  Unable to afford Zetia  Plan:  - Continue crestor 10 mg daily    - Continue lifestyle modification  - Consider intensification of therapy if above goal  - Recheck lipid panel in 6-12 mths.    Blood Pressure Management:Goal: JNC8 (2013) Office BP Goal:<140/90; Under Control: yes  Blood pressure under excellent control both at home and in the office  BP has been slightly lower at home and in recent office visits.  She takes Lasix as needed.  Plan:  - Continue losartan- HCT 1/2 tab daily.  - Continue home blood pressure monitoring  - Recheck GFR electrolytes prior to next visit    Glycemic Status: Diabetic  A1c elevated on recent labs  Plan:  - Increase 1/2 tab metformin to tablet twice daily  - Recheck A1C 3 months.  - Recheck urine for albumin  - Recommended yearly flu shot    Anti-Platelet/Anti-Coagulant Tx: yes  - Continue aspirin and cilostazol    Smoking: Recommended complete smoking cessation  Not a good candidate for Chantix or Wellbutrin  Not willing to go to smoking cessation classes  Not willing to quit at this time  Recommended e-cigarette or vaping as an alternative.  Plan:  - Willing to re-try vaping.  - Try to decrease # of cigarettes per day.     Physical Activity: As much walking as possible    Weight Management and Nutrition: Dietary plan was discussed with patient at this visit including better diabetic diet    Other:     1.  carotid disease status post recent carotid endarterectomy with some evidence of residual stenosis on carotid duplex and at least moderate contralateral stenosis  - Continue medical management as above  - Continue with yearly surveillance     2. peripheral arterial disease status post right iliac stent without lifestyle limiting claudication at present. Recent angiogram showed no significant target of intervention. No evidence of critical limb ischemia. She does have evidence of small vessel disease and perhaps some vasospasm.   - Continue medical management as above.   - Continue with noninvasive surveillance.    - Call immediately if she  begins to develop any skin breakdown or rest pain (patient verbalized understanding)    3. coronary artery disease status post CABG currently without angina - continue medical management as above. Re-check BNP.    4. Hypothyroidism-  TSH elevated.  C/o worsening fatigue.  Continue thyroid repletion at current dose.  TSH in range.  Defer further management to PCP    5. Hip pain - defer management to orthopedics and PCP    6. Affective disorder - defer management to PCP    Instructed to follow-up with PCP for remainder of adult medical needs: yes  We will partner with other providers in the management of established vascular disease and cardiometabolic risk factors.    Studies to Be Obtained:   1. Carotid duplex- April 2018  2.  aortoiliac duplex, lower extremity arterial duplex, and LES - in April 2018    Labs to Be Obtained: As above prior to next visit    Follow up in: 5 months with Dr. Bloch (will have MB review recent imaging done at [facility] to see when she will need the above surveillance scans completed)    RAHUL Larson    CC:  MD Feliciano Wise MD John Hansen, MD

## 2018-01-08 ENCOUNTER — APPOINTMENT (OUTPATIENT)
Dept: VASCULAR LAB | Facility: MEDICAL CENTER | Age: 78
End: 2018-01-08
Payer: MEDICARE

## 2018-01-31 RX ORDER — TIOTROPIUM BROMIDE 18 UG/1
CAPSULE ORAL; RESPIRATORY (INHALATION)
Qty: 90 CAP | Refills: 1 | Status: SHIPPED | OUTPATIENT
Start: 2018-01-31

## 2018-02-01 ENCOUNTER — HOSPITAL ENCOUNTER (OUTPATIENT)
Dept: RADIOLOGY | Facility: MEDICAL CENTER | Age: 78
End: 2018-02-01
Attending: SURGERY
Payer: MEDICARE

## 2018-02-01 DIAGNOSIS — M81.0 AGE-RELATED OSTEOPOROSIS WITHOUT CURRENT PATHOLOGICAL FRACTURE: ICD-10-CM

## 2018-02-01 PROCEDURE — 77080 DXA BONE DENSITY AXIAL: CPT

## 2018-02-02 ENCOUNTER — HOSPITAL ENCOUNTER (OUTPATIENT)
Dept: RADIOLOGY | Facility: MEDICAL CENTER | Age: 78
End: 2018-02-02
Attending: SURGERY
Payer: MEDICARE

## 2018-02-02 DIAGNOSIS — I65.23 BILATERAL CAROTID ARTERY STENOSIS: ICD-10-CM

## 2018-02-02 PROCEDURE — 93880 EXTRACRANIAL BILAT STUDY: CPT

## 2018-02-26 ENCOUNTER — TELEPHONE (OUTPATIENT)
Dept: VASCULAR LAB | Facility: MEDICAL CENTER | Age: 78
End: 2018-02-26

## 2018-02-26 DIAGNOSIS — I73.9 PERIPHERAL ARTERIAL DISEASE (HCC): ICD-10-CM

## 2018-02-26 DIAGNOSIS — I73.9 INTERMITTENT CLAUDICATION (HCC): ICD-10-CM

## 2018-03-04 DIAGNOSIS — I15.2 HYPERTENSION DUE TO ENDOCRINE DISORDER: ICD-10-CM

## 2018-03-05 RX ORDER — LOSARTAN POTASSIUM AND HYDROCHLOROTHIAZIDE 12.5; 5 MG/1; MG/1
TABLET ORAL
Qty: 90 TAB | Refills: 1 | Status: SHIPPED | OUTPATIENT
Start: 2018-03-05

## 2018-03-05 NOTE — TELEPHONE ENCOUNTER
Refill X 6 months, sent to pharmacy.Pt. Seen in the last 6 months per protocol.   Lab Results   Component Value Date/Time    SODIUM 133 (L) 10/04/2017 10:24 AM    POTASSIUM 3.8 10/04/2017 10:24 AM    CHLORIDE 98 10/04/2017 10:24 AM    CO2 26 10/04/2017 10:24 AM    GLUCOSE 95 10/04/2017 10:24 AM    BUN 15 10/04/2017 10:24 AM    CREATININE 0.69 10/04/2017 10:24 AM    CREATININE 0.95 08/03/2010 12:00 AM    BUNCREATRAT 13 08/03/2010 12:00 AM    GLOMRATE 58 (L) 08/03/2010 12:00 AM

## 2018-03-05 NOTE — TELEPHONE ENCOUNTER
Was the patient seen in the last year in this department? Yes     Does patient have an active prescription for medications requested? No     Received Request Via: Pharmacy      Pt met protocol?: Yes pt last ov 12/17   BP Readings from Last 1 Encounters:   01/02/18 137/57

## 2018-03-18 ENCOUNTER — OFFICE VISIT (OUTPATIENT)
Dept: URGENT CARE | Facility: PHYSICIAN GROUP | Age: 78
End: 2018-03-18
Payer: MEDICARE

## 2018-03-18 VITALS
TEMPERATURE: 98.6 F | DIASTOLIC BLOOD PRESSURE: 66 MMHG | OXYGEN SATURATION: 92 % | BODY MASS INDEX: 22.65 KG/M2 | RESPIRATION RATE: 16 BRPM | SYSTOLIC BLOOD PRESSURE: 160 MMHG | WEIGHT: 105 LBS | HEART RATE: 90 BPM | HEIGHT: 57 IN

## 2018-03-18 DIAGNOSIS — J20.9 ACUTE BRONCHITIS, UNSPECIFIED ORGANISM: ICD-10-CM

## 2018-03-18 PROCEDURE — 99214 OFFICE O/P EST MOD 30 MIN: CPT | Performed by: PHYSICIAN ASSISTANT

## 2018-03-18 RX ORDER — AZITHROMYCIN 250 MG/1
TABLET, FILM COATED ORAL
Qty: 6 TAB | Refills: 0 | Status: ON HOLD | OUTPATIENT
Start: 2018-03-18 | End: 2018-03-29

## 2018-03-18 RX ORDER — BENZONATATE 100 MG/1
100-200 CAPSULE ORAL 3 TIMES DAILY PRN
Qty: 60 CAP | Refills: 0 | Status: SHIPPED | OUTPATIENT
Start: 2018-03-18 | End: 2018-03-20

## 2018-03-18 RX ORDER — ALBUTEROL SULFATE 90 UG/1
2 AEROSOL, METERED RESPIRATORY (INHALATION) EVERY 6 HOURS PRN
Qty: 8.5 G | Refills: 1 | Status: SHIPPED | OUTPATIENT
Start: 2018-03-18 | End: 2018-03-20

## 2018-03-20 ENCOUNTER — APPOINTMENT (OUTPATIENT)
Dept: RADIOLOGY | Facility: IMAGING CENTER | Age: 78
End: 2018-03-20
Attending: NURSE PRACTITIONER
Payer: MEDICARE

## 2018-03-20 ENCOUNTER — APPOINTMENT (OUTPATIENT)
Dept: RADIOLOGY | Facility: MEDICAL CENTER | Age: 78
DRG: 871 | End: 2018-03-20
Attending: EMERGENCY MEDICINE
Payer: MEDICARE

## 2018-03-20 ENCOUNTER — OFFICE VISIT (OUTPATIENT)
Dept: URGENT CARE | Facility: PHYSICIAN GROUP | Age: 78
End: 2018-03-20
Payer: MEDICARE

## 2018-03-20 ENCOUNTER — HOSPITAL ENCOUNTER (INPATIENT)
Facility: MEDICAL CENTER | Age: 78
LOS: 9 days | DRG: 871 | End: 2018-03-29
Attending: EMERGENCY MEDICINE | Admitting: INTERNAL MEDICINE
Payer: MEDICARE

## 2018-03-20 ENCOUNTER — TELEPHONE (OUTPATIENT)
Dept: VASCULAR LAB | Facility: MEDICAL CENTER | Age: 78
End: 2018-03-20

## 2018-03-20 ENCOUNTER — RESOLUTE PROFESSIONAL BILLING HOSPITAL PROF FEE (OUTPATIENT)
Dept: MEDSURG UNIT | Facility: MEDICAL CENTER | Age: 78
End: 2018-03-20
Payer: MEDICARE

## 2018-03-20 VITALS
HEIGHT: 57 IN | BODY MASS INDEX: 22.65 KG/M2 | SYSTOLIC BLOOD PRESSURE: 106 MMHG | WEIGHT: 105 LBS | TEMPERATURE: 98.2 F | HEART RATE: 94 BPM | OXYGEN SATURATION: 91 % | DIASTOLIC BLOOD PRESSURE: 52 MMHG

## 2018-03-20 DIAGNOSIS — J44.1 COPD WITH ACUTE EXACERBATION (HCC): ICD-10-CM

## 2018-03-20 DIAGNOSIS — Z91.81 RISK FOR FALLS: ICD-10-CM

## 2018-03-20 DIAGNOSIS — Z85.118 HISTORY OF LUNG CANCER: ICD-10-CM

## 2018-03-20 DIAGNOSIS — J96.01 ACUTE HYPOXEMIC RESPIRATORY FAILURE (HCC): ICD-10-CM

## 2018-03-20 DIAGNOSIS — J44.1 COPD WITH ACUTE EXACERBATION (HCC): Primary | ICD-10-CM

## 2018-03-20 DIAGNOSIS — F33.2 SEVERE EPISODE OF RECURRENT MAJOR DEPRESSIVE DISORDER, WITHOUT PSYCHOTIC FEATURES (HCC): ICD-10-CM

## 2018-03-20 LAB
ALBUMIN SERPL BCP-MCNC: 3.5 G/DL (ref 3.2–4.9)
ALBUMIN/GLOB SERPL: 1.1 G/DL
ALP SERPL-CCNC: 101 U/L (ref 30–99)
ALT SERPL-CCNC: 20 U/L (ref 2–50)
ANION GAP SERPL CALC-SCNC: 10 MMOL/L (ref 0–11.9)
APPEARANCE UR: CLEAR
AST SERPL-CCNC: 54 U/L (ref 12–45)
BACTERIA #/AREA URNS HPF: NEGATIVE /HPF
BASOPHILS # BLD AUTO: 0.3 % (ref 0–1.8)
BASOPHILS # BLD: 0.04 K/UL (ref 0–0.12)
BILIRUB SERPL-MCNC: 0.4 MG/DL (ref 0.1–1.5)
BILIRUB UR QL STRIP.AUTO: NEGATIVE
BNP SERPL-MCNC: 312 PG/ML (ref 0–100)
BUN SERPL-MCNC: 20 MG/DL (ref 8–22)
CALCIUM SERPL-MCNC: 9.2 MG/DL (ref 8.5–10.5)
CHLORIDE SERPL-SCNC: 94 MMOL/L (ref 96–112)
CO2 SERPL-SCNC: 25 MMOL/L (ref 20–33)
COLOR UR: YELLOW
CREAT SERPL-MCNC: 0.78 MG/DL (ref 0.5–1.4)
EKG IMPRESSION: NORMAL
EOSINOPHIL # BLD AUTO: 0.01 K/UL (ref 0–0.51)
EOSINOPHIL NFR BLD: 0.1 % (ref 0–6.9)
EPI CELLS #/AREA URNS HPF: ABNORMAL /HPF
ERYTHROCYTE [DISTWIDTH] IN BLOOD BY AUTOMATED COUNT: 44.4 FL (ref 35.9–50)
FLUAV RNA SPEC QL NAA+PROBE: NEGATIVE
FLUBV RNA SPEC QL NAA+PROBE: NEGATIVE
GGT SERPL-CCNC: 70 U/L (ref 7–34)
GLOBULIN SER CALC-MCNC: 3.2 G/DL (ref 1.9–3.5)
GLUCOSE SERPL-MCNC: 135 MG/DL (ref 65–99)
GLUCOSE UR STRIP.AUTO-MCNC: NEGATIVE MG/DL
HCT VFR BLD AUTO: 40.6 % (ref 37–47)
HGB BLD-MCNC: 13.8 G/DL (ref 12–16)
HYALINE CASTS #/AREA URNS LPF: ABNORMAL /LPF
IMM GRANULOCYTES # BLD AUTO: 0.06 K/UL (ref 0–0.11)
IMM GRANULOCYTES NFR BLD AUTO: 0.5 % (ref 0–0.9)
KETONES UR STRIP.AUTO-MCNC: NEGATIVE MG/DL
LACTATE BLD-SCNC: 1.7 MMOL/L (ref 0.5–2)
LACTATE BLD-SCNC: 2.2 MMOL/L (ref 0.5–2)
LEUKOCYTE ESTERASE UR QL STRIP.AUTO: NEGATIVE
LYMPHOCYTES # BLD AUTO: 1.3 K/UL (ref 1–4.8)
LYMPHOCYTES NFR BLD: 10.4 % (ref 22–41)
MCH RBC QN AUTO: 32.1 PG (ref 27–33)
MCHC RBC AUTO-ENTMCNC: 34 G/DL (ref 33.6–35)
MCV RBC AUTO: 94.4 FL (ref 81.4–97.8)
MICRO URNS: ABNORMAL
MONOCYTES # BLD AUTO: 0.85 K/UL (ref 0–0.85)
MONOCYTES NFR BLD AUTO: 6.8 % (ref 0–13.4)
NEUTROPHILS # BLD AUTO: 10.28 K/UL (ref 2–7.15)
NEUTROPHILS NFR BLD: 81.9 % (ref 44–72)
NITRITE UR QL STRIP.AUTO: NEGATIVE
NRBC # BLD AUTO: 0 K/UL
NRBC BLD-RTO: 0 /100 WBC
PH UR STRIP.AUTO: 6 [PH]
PLATELET # BLD AUTO: 257 K/UL (ref 164–446)
PMV BLD AUTO: 10 FL (ref 9–12.9)
POTASSIUM SERPL-SCNC: 4.1 MMOL/L (ref 3.6–5.5)
PROT SERPL-MCNC: 6.7 G/DL (ref 6–8.2)
PROT UR QL STRIP: 300 MG/DL
RBC # BLD AUTO: 4.3 M/UL (ref 4.2–5.4)
RBC # URNS HPF: ABNORMAL /HPF
RBC UR QL AUTO: ABNORMAL
SODIUM SERPL-SCNC: 129 MMOL/L (ref 135–145)
SP GR UR STRIP.AUTO: 1.01
UROBILINOGEN UR STRIP.AUTO-MCNC: 1 MG/DL
WBC # BLD AUTO: 12.5 K/UL (ref 4.8–10.8)
WBC #/AREA URNS HPF: ABNORMAL /HPF

## 2018-03-20 PROCEDURE — 87086 URINE CULTURE/COLONY COUNT: CPT

## 2018-03-20 PROCEDURE — 99214 OFFICE O/P EST MOD 30 MIN: CPT | Mod: 25 | Performed by: NURSE PRACTITIONER

## 2018-03-20 PROCEDURE — 85025 COMPLETE CBC W/AUTO DIFF WBC: CPT

## 2018-03-20 PROCEDURE — 94640 AIRWAY INHALATION TREATMENT: CPT | Performed by: NURSE PRACTITIONER

## 2018-03-20 PROCEDURE — 94640 AIRWAY INHALATION TREATMENT: CPT

## 2018-03-20 PROCEDURE — 84145 PROCALCITONIN (PCT): CPT

## 2018-03-20 PROCEDURE — 83036 HEMOGLOBIN GLYCOSYLATED A1C: CPT

## 2018-03-20 PROCEDURE — 87502 INFLUENZA DNA AMP PROBE: CPT

## 2018-03-20 PROCEDURE — 71046 X-RAY EXAM CHEST 2 VIEWS: CPT | Mod: TC | Performed by: NURSE PRACTITIONER

## 2018-03-20 PROCEDURE — 96367 TX/PROPH/DG ADDL SEQ IV INF: CPT

## 2018-03-20 PROCEDURE — 93005 ELECTROCARDIOGRAM TRACING: CPT | Performed by: STUDENT IN AN ORGANIZED HEALTH CARE EDUCATION/TRAINING PROGRAM

## 2018-03-20 PROCEDURE — 99291 CRITICAL CARE FIRST HOUR: CPT

## 2018-03-20 PROCEDURE — 80053 COMPREHEN METABOLIC PANEL: CPT

## 2018-03-20 PROCEDURE — 36415 COLL VENOUS BLD VENIPUNCTURE: CPT

## 2018-03-20 PROCEDURE — 83880 ASSAY OF NATRIURETIC PEPTIDE: CPT

## 2018-03-20 PROCEDURE — 84484 ASSAY OF TROPONIN QUANT: CPT

## 2018-03-20 PROCEDURE — 94760 N-INVAS EAR/PLS OXIMETRY 1: CPT | Performed by: NURSE PRACTITIONER

## 2018-03-20 PROCEDURE — 83605 ASSAY OF LACTIC ACID: CPT

## 2018-03-20 PROCEDURE — 81001 URINALYSIS AUTO W/SCOPE: CPT

## 2018-03-20 PROCEDURE — 87040 BLOOD CULTURE FOR BACTERIA: CPT

## 2018-03-20 PROCEDURE — 770022 HCHG ROOM/CARE - ICU (200)

## 2018-03-20 PROCEDURE — 700105 HCHG RX REV CODE 258: Performed by: EMERGENCY MEDICINE

## 2018-03-20 PROCEDURE — 700101 HCHG RX REV CODE 250: Performed by: EMERGENCY MEDICINE

## 2018-03-20 PROCEDURE — 96375 TX/PRO/DX INJ NEW DRUG ADDON: CPT

## 2018-03-20 PROCEDURE — 96361 HYDRATE IV INFUSION ADD-ON: CPT

## 2018-03-20 PROCEDURE — 82977 ASSAY OF GGT: CPT

## 2018-03-20 PROCEDURE — 96365 THER/PROPH/DIAG IV INF INIT: CPT

## 2018-03-20 PROCEDURE — 700111 HCHG RX REV CODE 636 W/ 250 OVERRIDE (IP): Performed by: EMERGENCY MEDICINE

## 2018-03-20 RX ORDER — DOXYCYCLINE 100 MG/1
100 TABLET ORAL EVERY 12 HOURS
Status: DISCONTINUED | OUTPATIENT
Start: 2018-03-21 | End: 2018-03-21

## 2018-03-20 RX ORDER — IPRATROPIUM BROMIDE AND ALBUTEROL SULFATE 2.5; .5 MG/3ML; MG/3ML
3 SOLUTION RESPIRATORY (INHALATION) ONCE
Status: COMPLETED | OUTPATIENT
Start: 2018-03-20 | End: 2018-03-20

## 2018-03-20 RX ORDER — SODIUM CHLORIDE 9 MG/ML
1650 INJECTION, SOLUTION INTRAVENOUS ONCE
Status: COMPLETED | OUTPATIENT
Start: 2018-03-20 | End: 2018-03-20

## 2018-03-20 RX ORDER — ARIPIPRAZOLE 10 MG/1
5 TABLET ORAL ONCE
Status: ACTIVE | OUTPATIENT
Start: 2018-03-21 | End: 2018-03-22

## 2018-03-20 RX ORDER — FLUTICASONE PROPIONATE 50 MCG
1 SPRAY, SUSPENSION (ML) NASAL 2 TIMES DAILY
Status: DISCONTINUED | OUTPATIENT
Start: 2018-03-21 | End: 2018-03-22

## 2018-03-20 RX ORDER — DULOXETIN HYDROCHLORIDE 60 MG/1
60 CAPSULE, DELAYED RELEASE ORAL DAILY
Status: DISCONTINUED | OUTPATIENT
Start: 2018-03-21 | End: 2018-03-22

## 2018-03-20 RX ORDER — ROSUVASTATIN CALCIUM 10 MG/1
10 TABLET, COATED ORAL EVERY EVENING
Status: DISCONTINUED | OUTPATIENT
Start: 2018-03-21 | End: 2018-03-22

## 2018-03-20 RX ORDER — AZITHROMYCIN 500 MG/1
500 INJECTION, POWDER, LYOPHILIZED, FOR SOLUTION INTRAVENOUS ONCE
Status: DISCONTINUED | OUTPATIENT
Start: 2018-03-20 | End: 2018-03-20

## 2018-03-20 RX ORDER — METHYLPREDNISOLONE SODIUM SUCCINATE 125 MG/2ML
125 INJECTION, POWDER, LYOPHILIZED, FOR SOLUTION INTRAMUSCULAR; INTRAVENOUS ONCE
Status: COMPLETED | OUTPATIENT
Start: 2018-03-20 | End: 2018-03-20

## 2018-03-20 RX ORDER — GUAIFENESIN/DEXTROMETHORPHAN 100-10MG/5
10 SYRUP ORAL EVERY 6 HOURS PRN
Status: DISCONTINUED | OUTPATIENT
Start: 2018-03-20 | End: 2018-03-22

## 2018-03-20 RX ORDER — FUROSEMIDE 40 MG/1
40 TABLET ORAL
Status: DISCONTINUED | OUTPATIENT
Start: 2018-03-20 | End: 2018-03-21

## 2018-03-20 RX ORDER — OXYCODONE AND ACETAMINOPHEN 10; 325 MG/1; MG/1
1 TABLET ORAL EVERY 4 HOURS PRN
Status: DISCONTINUED | OUTPATIENT
Start: 2018-03-20 | End: 2018-03-22

## 2018-03-20 RX ORDER — POLYETHYLENE GLYCOL 3350 17 G/17G
1 POWDER, FOR SOLUTION ORAL
Status: DISCONTINUED | OUTPATIENT
Start: 2018-03-20 | End: 2018-03-22

## 2018-03-20 RX ORDER — IPRATROPIUM BROMIDE AND ALBUTEROL SULFATE 2.5; .5 MG/3ML; MG/3ML
3 SOLUTION RESPIRATORY (INHALATION)
Status: COMPLETED | OUTPATIENT
Start: 2018-03-20 | End: 2018-03-20

## 2018-03-20 RX ORDER — SODIUM CHLORIDE 9 MG/ML
INJECTION, SOLUTION INTRAVENOUS CONTINUOUS
Status: DISCONTINUED | OUTPATIENT
Start: 2018-03-20 | End: 2018-03-22

## 2018-03-20 RX ORDER — DULOXETIN HYDROCHLORIDE 60 MG/1
60 CAPSULE, DELAYED RELEASE ORAL 2 TIMES DAILY
COMMUNITY

## 2018-03-20 RX ORDER — BISACODYL 10 MG
10 SUPPOSITORY, RECTAL RECTAL
Status: DISCONTINUED | OUTPATIENT
Start: 2018-03-20 | End: 2018-03-22

## 2018-03-20 RX ORDER — POTASSIUM CHLORIDE 600 MG/1
8 CAPSULE, EXTENDED RELEASE ORAL
Status: ON HOLD | COMMUNITY
End: 2018-03-29

## 2018-03-20 RX ORDER — FUROSEMIDE 40 MG/1
40 TABLET ORAL
Status: ON HOLD | COMMUNITY
End: 2018-03-29

## 2018-03-20 RX ORDER — LOSARTAN POTASSIUM AND HYDROCHLOROTHIAZIDE 12.5; 5 MG/1; MG/1
1 TABLET ORAL DAILY
Status: DISCONTINUED | OUTPATIENT
Start: 2018-03-21 | End: 2018-03-21

## 2018-03-20 RX ORDER — ACETAMINOPHEN 325 MG/1
650 TABLET ORAL EVERY 6 HOURS PRN
Status: DISCONTINUED | OUTPATIENT
Start: 2018-03-20 | End: 2018-03-22

## 2018-03-20 RX ORDER — CILOSTAZOL 100 MG/1
50 TABLET ORAL 2 TIMES DAILY
Status: DISCONTINUED | OUTPATIENT
Start: 2018-03-21 | End: 2018-03-21

## 2018-03-20 RX ORDER — ASPIRIN 81 MG/1
81 TABLET, CHEWABLE ORAL DAILY
Status: DISCONTINUED | OUTPATIENT
Start: 2018-03-21 | End: 2018-03-22

## 2018-03-20 RX ORDER — AMOXICILLIN 250 MG
2 CAPSULE ORAL 2 TIMES DAILY
Status: DISCONTINUED | OUTPATIENT
Start: 2018-03-20 | End: 2018-03-22

## 2018-03-20 RX ORDER — IPRATROPIUM BROMIDE AND ALBUTEROL SULFATE 2.5; .5 MG/3ML; MG/3ML
3 SOLUTION RESPIRATORY (INHALATION)
Status: DISCONTINUED | OUTPATIENT
Start: 2018-03-21 | End: 2018-03-22

## 2018-03-20 RX ADMIN — IPRATROPIUM BROMIDE AND ALBUTEROL SULFATE 3 ML: 2.5; .5 SOLUTION RESPIRATORY (INHALATION) at 18:35

## 2018-03-20 RX ADMIN — IPRATROPIUM BROMIDE AND ALBUTEROL SULFATE 3 ML: .5; 3 SOLUTION RESPIRATORY (INHALATION) at 20:37

## 2018-03-20 RX ADMIN — AZITHROMYCIN FOR INJECTION INJECTION, POWDER, LYOPHILIZED, FOR SOLUTION 500 MG: 500 INJECTION INTRAVENOUS at 22:03

## 2018-03-20 RX ADMIN — SODIUM CHLORIDE 1650 ML: 9 INJECTION, SOLUTION INTRAVENOUS at 20:26

## 2018-03-20 RX ADMIN — METHYLPREDNISOLONE SODIUM SUCCINATE 125 MG: 125 INJECTION, POWDER, FOR SOLUTION INTRAMUSCULAR; INTRAVENOUS at 21:15

## 2018-03-20 RX ADMIN — SODIUM CHLORIDE: 9 INJECTION, SOLUTION INTRAVENOUS at 23:36

## 2018-03-20 RX ADMIN — AMPICILLIN SODIUM AND SULBACTAM SODIUM 3 G: 2; 1 INJECTION, POWDER, FOR SOLUTION INTRAMUSCULAR; INTRAVENOUS at 21:10

## 2018-03-20 ASSESSMENT — ENCOUNTER SYMPTOMS
COUGH: 1
FEVER: 0
SPUTUM PRODUCTION: 0
SHORTNESS OF BREATH: 1
NAUSEA: 0
WEAKNESS: 1
FEVER: 0
WHEEZING: 1
SHORTNESS OF BREATH: 1
MYALGIAS: 0
CHILLS: 0
ABDOMINAL PAIN: 0
DIARRHEA: 0
MYALGIAS: 0
SORE THROAT: 0
COUGH: 1
SORE THROAT: 0
SPUTUM PRODUCTION: 1
WHEEZING: 0
DIARRHEA: 0
CHILLS: 0
RHINORRHEA: 1
DIZZINESS: 0
VOMITING: 0
MUSCULOSKELETAL NEGATIVE: 1
NAUSEA: 0
VOMITING: 0

## 2018-03-20 ASSESSMENT — LIFESTYLE VARIABLES
EVER_SMOKED: YES
DO YOU DRINK ALCOHOL: NO

## 2018-03-20 ASSESSMENT — COPD QUESTIONNAIRES
COPD: 1
DURING THE PAST 4 WEEKS HOW MUCH DID YOU FEEL SHORT OF BREATH: NONE/LITTLE OF THE TIME
HAVE YOU SMOKED AT LEAST 100 CIGARETTES IN YOUR ENTIRE LIFE: YES
COPD SCREENING SCORE: 6
DO YOU EVER COUGH UP ANY MUCUS OR PHLEGM?: NO/ONLY WITH OCCASIONAL COLDS OR INFECTIONS
COPD: 1

## 2018-03-20 ASSESSMENT — PAIN SCALES - GENERAL: PAINLEVEL_OUTOF10: 6

## 2018-03-20 NOTE — PROGRESS NOTES
"Subjective:      Mia Hammond is a 77 y.o. female who presents with Cough (C/o unproductive cough, chest congestion, SOB x1 wk got worse yesterday.  Has COPD)            Cough   This is a new problem. The current episode started 1 to 4 weeks ago (1 week). The problem has been unchanged. The problem occurs every few minutes. The cough is non-productive. Associated symptoms include shortness of breath. Pertinent negatives include no chest pain, chills, ear pain, fever, myalgias, nasal congestion, rash, sore throat or wheezing. Nothing aggravates the symptoms. She has tried OTC cough suppressant for the symptoms. The treatment provided mild relief. Her past medical history is significant for bronchitis and COPD. There is no history of asthma or pneumonia.     Patient presents to urgent care reporting a 1 week history of a productive cough. She is a longtime smoker with COPD and has had difficulty breathing as a result of her coughing fits. No fevers, chills, body aches, chest pain, productive cough, or dizziness. No known sick contacts or recent use of antibiotics.     Review of Systems   Constitutional: Negative for chills and fever.   HENT: Negative for congestion, ear pain and sore throat.    Respiratory: Positive for cough and shortness of breath. Negative for sputum production and wheezing.    Cardiovascular: Negative for chest pain.   Gastrointestinal: Negative for abdominal pain, diarrhea, nausea and vomiting.   Genitourinary: Negative.    Musculoskeletal: Negative.  Negative for myalgias.   Skin: Negative for rash.   Neurological: Negative for dizziness.          Objective:     /66   Pulse 90   Temp 37 °C (98.6 °F)   Resp 16   Ht 1.448 m (4' 9\")   Wt 47.6 kg (105 lb)   SpO2 92%   BMI 22.72 kg/m²      Physical Exam   Constitutional: She is oriented to person, place, and time. She appears well-developed and well-nourished. No distress.   HENT:   Head: Normocephalic and atraumatic.   Right Ear: " Hearing, tympanic membrane, external ear and ear canal normal.   Left Ear: Hearing, tympanic membrane, external ear and ear canal normal.   Mouth/Throat: Oropharynx is clear and moist. No oropharyngeal exudate.   Eyes: Conjunctivae are normal. Pupils are equal, round, and reactive to light. Right eye exhibits no discharge. Left eye exhibits no discharge.   Neck: Normal range of motion.   Cardiovascular: Normal rate, regular rhythm and normal heart sounds.    No murmur heard.  Pulmonary/Chest: Effort normal. No respiratory distress. She has wheezes. She has no rales.   Decreased breath sounds throughout   Musculoskeletal: Normal range of motion.   Lymphadenopathy:     She has no cervical adenopathy.   Neurological: She is alert and oriented to person, place, and time.   Skin: Skin is warm and dry. She is not diaphoretic.   Psychiatric: She has a normal mood and affect. Her behavior is normal.   Nursing note and vitals reviewed.            PMH:  has a past medical history of Anxiety; Arthritis; Asthma; Cancer (CMS-HCC) (2008); Cataract; COPD (chronic obstructive pulmonary disease) (CMS-HCC); Dental disorder; Depression (7/27/2010); Diabetes; Encounter for long-term (current) use of other medications; Hyperlipidemia (7/27/2010); Hypertension; Muscle disorder; PAD (peripheral artery disease) (CMS-HCC); Pain (12-10-15); S/P CABG (coronary artery bypass graft) (7/27/2010); Scoliosis; Thyroid disease; Tobacco dependence; Urinary bladder disorder; and Urinary incontinence.  MEDS:   Current Outpatient Prescriptions:   •  guaiFENesin (ROBITUSSIN) 100 MG/5ML Solution, Take 10 mL by mouth every four hours as needed., Disp: , Rfl:   •  benzonatate (TESSALON) 100 MG Cap, Take 1-2 Caps by mouth 3 times a day as needed for Cough., Disp: 60 Cap, Rfl: 0  •  azithromycin (ZITHROMAX) 250 MG Tab, Take 2 tablets by mouth on day one, then 1 tablet by mouth on days two through five, Disp: 6 Tab, Rfl: 0  •  albuterol 108 (90 Base) MCG/ACT  Aero Soln inhalation aerosol, Inhale 2 Puffs by mouth every 6 hours as needed for Shortness of Breath., Disp: 8.5 g, Rfl: 1  •  losartan-hydrochlorothiazide (HYZAAR) 50-12.5 MG per tablet, TAKE 1 TABLET BY MOUTH ONCE DAILY, Disp: 90 Tab, Rfl: 1  •  SPIRIVA HANDIHALER 18 MCG Cap, INHALE THE CONTENTS OF 1 CAPSULE BY MOUTH VIA HANDIHALER ONCE DAILY, Disp: 90 Cap, Rfl: 1  •  cilostazol (PLETAL) 100 MG Tab, TAKE 1 TABLET BY MOUTH TWICE DAILY FOR LEG PAIN, Disp: 90 Tab, Rfl: 2  •  lamotrigine (LAMICTAL) 200 MG tablet, TAKE 1 TABLET BY MOUTH TWICE DAILY, Disp: 60 Tab, Rfl: 5  •  duloxetine (CYMBALTA) 60 MG Cap DR Particles delayed-release capsule, TAKE 2 CAPSULES BY MOUTH ONCE DAILY AT BEDTIME( GENERALIZED ANXIETY DISORDER, MAJOR DEPRESSIVE DISORDER), Disp: 180 Cap, Rfl: 1  •  fluticasone (FLONASE) 50 MCG/ACT nasal spray, SHAKE LIQUID AND USE 1 SPRAY IN EACH NOSTRIL TWICE DAILY, Disp: 3 Bottle, Rfl: 3  •  Blood Glucose Monitoring Suppl Supplies Misc, Test strips order: Test strips for one touch ultra meter.Sig: use once daily and prn ssx high or low sugar #100 RF x 3 DX: E11, Disp: 100 Each, Rfl: 3  •  aripiprazole (ABILIFY) 5 MG tablet, TAKE 1 TABLET BY MOUTH ONCE DAILY IN THE MORNING, Disp: 90 Tab, Rfl: 1  •  levothyroxine (SYNTHROID) 100 MCG Tab, Take 1 Tab by mouth Every morning on an empty stomach., Disp: 30 Tab, Rfl: 11  •  furosemide (LASIX) 40 MG Tab, Take 1 Tab by mouth every day., Disp: 30 Tab, Rfl: 2  •  metformin (GLUCOPHAGE) 500 MG Tab, Take 500 mg by mouth 2 times a day, with meals., Disp: , Rfl:   •  rosuvastatin (CRESTOR) 10 MG Tab, Take 1 Tab by mouth every evening., Disp: 30 Tab, Rfl: 11  •  lorazepam (ATIVAN) 0.5 MG Tab, TAKE 1 TABLET BY MOUTH EVERY 12 HOURS AS NEEDED FOR ANXIETY, Disp: 30 Tab, Rfl: 0  •  mupirocin calcium (BACTROBAN) 2 % Cream, Apply small amount to affected area bid for up to 10 days., Disp: 1 Tube, Rfl: 0  •  albuterol 108 (90 BASE) MCG/ACT Aero Soln inhalation aerosol, Inhale 1-2  Puffs by mouth every 6 hours as needed. Give albuterol that is patient or insurance preference please, Disp: 8.5 g, Rfl: 1  •  KLOR-CON 8 MEQ tablet, TAKE 1 TABLET BY MOUTH ONCE DAILY, Disp: 90 Tab, Rfl: 1  •  Oxycodone-Acetaminophen (PERCOCET-10)  MG Tab, Take 1 Tab by mouth every four hours as needed for Severe Pain., Disp: , Rfl:   •  senna-docusate (PERICOLACE OR SENOKOT S) 8.6-50 MG Tab, Take 1 Tab by mouth every day., Disp: , Rfl:   •  baclofen (LIORESAL) 10 MG TABS, Take 10 mg by mouth 2 times a day., Disp: , Rfl:   •  Diclofenac Sodium (VOLTAREN) 1 % GEL, Apply thin layer to affected area tid, prn., Disp: 1 Tube, Rfl: 1  •  aspirin 81 MG tablet, Take 81 mg by mouth every day., Disp: , Rfl:   ALLERGIES:   Allergies   Allergen Reactions   • Codeine Vomiting   • Latex Hives   • Sulfa Drugs Hives   • Tape      Paper ok     SURGHX:   Past Surgical History:   Procedure Laterality Date   • RECOVERY  1/15/2016    Procedure: IR1 VASCULAR CASE BERNABE ABDOMINAL AORTOGRAM WITH RUNOFF, POSSIBLE INTERVENTION;  Surgeon: [facility];  Location: [place] [facility] [place];  Service:    • CAROTID ENDARTERECTOMY Left 12/16/2015    Procedure: CAROTID ENDARTERECTOMY;  Surgeon: Feliciano Bernabe M.D.;  Location: SURGERY [facility];  Service:    • OTHER ORTHOPEDIC SURGERY      repair left leg femur fx   • PB DEST,PARAVERTEBRAL,L/S,SINGLE  4/11/2011    Performed by LILIBETH FUNES at SURGERY SURGICAL [facility] ORS   • PB INJ DX/THER AGNT PARAVERT FACET JOINT, MANSOOR*  4/4/2011    Performed by LILIBETH FUNES at [facility] ORS   • PB INJ DX/THER AGNT PARAVERT FACET JOINT, MANSOOR*  3/21/2011    Performed by LILIBETH FUNES at [facility] ORS   • MULTIPLE CORONARY ARTERY BYPASS  2008   • LOBECTOMY  2008    left upper lobectomy for lung cancer   • APPENDECTOMY     • LAMINOTOMY     • VAGINAL HYSTERECTOMY TOTAL      1980's, dysplasia     SOCHX:  reports that she has been smoking Cigarettes.  She  started smoking about 57 years ago. She has a 55.00 pack-year smoking history. She has never used smokeless tobacco. She reports that she does not drink alcohol or use drugs.  FH: family history includes Diabetes in her brother, father, and sister; No Known Problems in her mother.    Assessment/Plan:     1. Acute bronchitis, unspecified organism  - benzonatate (TESSALON) 100 MG Cap; Take 1-2 Caps by mouth 3 times a day as needed for Cough.  Dispense: 60 Cap; Refill: 0  - azithromycin (ZITHROMAX) 250 MG Tab; Take 2 tablets by mouth on day one, then 1 tablet by mouth on days two through five  Dispense: 6 Tab; Refill: 0   - Complete full course of antibiotics as prescribed   - albuterol 108 (90 Base) MCG/ACT Aero Soln inhalation aerosol; Inhale 2 Puffs by mouth every 6 hours as needed for Shortness of Breath.  Dispense: 8.5 g; Refill: 1    Call or return to office if symptoms persist or worsen. The patient demonstrated a good understanding and agreed with the treatment plan.

## 2018-03-20 NOTE — TELEPHONE ENCOUNTER
Carotid duplex reviewed.  Continue medical management  Repeat duplex in one year.    Michael Bloch, MD  [facility]    Cc:  CLEO Bernabe

## 2018-03-21 ENCOUNTER — APPOINTMENT (OUTPATIENT)
Dept: RADIOLOGY | Facility: MEDICAL CENTER | Age: 78
DRG: 871 | End: 2018-03-21
Attending: INTERNAL MEDICINE
Payer: MEDICARE

## 2018-03-21 PROBLEM — R65.21 SEPTIC SHOCK (HCC): Status: ACTIVE | Noted: 2018-03-21

## 2018-03-21 PROBLEM — E87.20 LACTIC ACIDOSIS: Status: RESOLVED | Noted: 2018-03-21 | Resolved: 2018-03-21

## 2018-03-21 PROBLEM — A41.9 SEPTIC SHOCK (HCC): Status: ACTIVE | Noted: 2018-03-21

## 2018-03-21 PROBLEM — R79.89 ELEVATED TROPONIN: Status: ACTIVE | Noted: 2018-03-21

## 2018-03-21 PROBLEM — J18.9 CAP (COMMUNITY ACQUIRED PNEUMONIA): Status: ACTIVE | Noted: 2018-03-21

## 2018-03-21 PROBLEM — E87.1 HYPONATREMIA: Status: ACTIVE | Noted: 2018-03-21

## 2018-03-21 PROBLEM — J96.01 ACUTE HYPOXEMIC RESPIRATORY FAILURE (HCC): Status: ACTIVE | Noted: 2018-03-21

## 2018-03-21 PROBLEM — E87.20 LACTIC ACIDOSIS: Status: ACTIVE | Noted: 2018-03-21

## 2018-03-21 PROBLEM — E03.9 HYPOTHYROIDISM: Status: ACTIVE | Noted: 2018-03-21

## 2018-03-21 PROBLEM — E87.1 HYPONATREMIA: Status: RESOLVED | Noted: 2018-03-21 | Resolved: 2018-03-21

## 2018-03-21 LAB
ALBUMIN SERPL BCP-MCNC: 3.1 G/DL (ref 3.2–4.9)
ALBUMIN/GLOB SERPL: 1.2 G/DL
ALP SERPL-CCNC: 88 U/L (ref 30–99)
ALT SERPL-CCNC: 16 U/L (ref 2–50)
ANION GAP SERPL CALC-SCNC: 9 MMOL/L (ref 0–11.9)
APTT PPP: 30.1 SEC (ref 24.7–36)
AST SERPL-CCNC: 41 U/L (ref 12–45)
BASOPHILS # BLD AUTO: 0.1 % (ref 0–1.8)
BASOPHILS # BLD: 0.01 K/UL (ref 0–0.12)
BILIRUB SERPL-MCNC: 0.3 MG/DL (ref 0.1–1.5)
BUN SERPL-MCNC: 11 MG/DL (ref 8–22)
CALCIUM SERPL-MCNC: 8 MG/DL (ref 8.5–10.5)
CHLORIDE SERPL-SCNC: 101 MMOL/L (ref 96–112)
CO2 SERPL-SCNC: 25 MMOL/L (ref 20–33)
CREAT SERPL-MCNC: 0.59 MG/DL (ref 0.5–1.4)
EOSINOPHIL # BLD AUTO: 0 K/UL (ref 0–0.51)
EOSINOPHIL NFR BLD: 0 % (ref 0–6.9)
ERYTHROCYTE [DISTWIDTH] IN BLOOD BY AUTOMATED COUNT: 44.4 FL (ref 35.9–50)
EST. AVERAGE GLUCOSE BLD GHB EST-MCNC: 134 MG/DL
GLOBULIN SER CALC-MCNC: 2.6 G/DL (ref 1.9–3.5)
GLUCOSE BLD-MCNC: 196 MG/DL (ref 65–99)
GLUCOSE BLD-MCNC: 309 MG/DL (ref 65–99)
GLUCOSE SERPL-MCNC: 197 MG/DL (ref 65–99)
HBA1C MFR BLD: 6.3 % (ref 0–5.6)
HCT VFR BLD AUTO: 34.2 % (ref 37–47)
HGB BLD-MCNC: 11.6 G/DL (ref 12–16)
IMM GRANULOCYTES # BLD AUTO: 0.05 K/UL (ref 0–0.11)
IMM GRANULOCYTES NFR BLD AUTO: 0.5 % (ref 0–0.9)
INR PPP: 1.02 (ref 0.87–1.13)
LACTATE BLD-SCNC: 1.1 MMOL/L (ref 0.5–2)
LACTATE BLD-SCNC: 1.3 MMOL/L (ref 0.5–2)
LV EJECT FRACT  99904: 60
LV EJECT FRACT MOD 2C 99903: 63.62
LV EJECT FRACT MOD 4C 99902: 59.81
LV EJECT FRACT MOD BP 99901: 59.78
LYMPHOCYTES # BLD AUTO: 0.4 K/UL (ref 1–4.8)
LYMPHOCYTES NFR BLD: 3.8 % (ref 22–41)
MCH RBC QN AUTO: 32 PG (ref 27–33)
MCHC RBC AUTO-ENTMCNC: 33.9 G/DL (ref 33.6–35)
MCV RBC AUTO: 94.5 FL (ref 81.4–97.8)
MONOCYTES # BLD AUTO: 0.11 K/UL (ref 0–0.85)
MONOCYTES NFR BLD AUTO: 1.1 % (ref 0–13.4)
NEUTROPHILS # BLD AUTO: 9.85 K/UL (ref 2–7.15)
NEUTROPHILS NFR BLD: 94.5 % (ref 44–72)
NRBC # BLD AUTO: 0 K/UL
NRBC BLD-RTO: 0 /100 WBC
PLATELET # BLD AUTO: 240 K/UL (ref 164–446)
PMV BLD AUTO: 10 FL (ref 9–12.9)
POTASSIUM SERPL-SCNC: 3.4 MMOL/L (ref 3.6–5.5)
PROCALCITONIN SERPL-MCNC: 0.18 NG/ML
PROT SERPL-MCNC: 5.7 G/DL (ref 6–8.2)
PROTHROMBIN TIME: 13.1 SEC (ref 12–14.6)
RBC # BLD AUTO: 3.62 M/UL (ref 4.2–5.4)
SODIUM SERPL-SCNC: 135 MMOL/L (ref 135–145)
TROPONIN I SERPL-MCNC: 0.12 NG/ML (ref 0–0.04)
TROPONIN I SERPL-MCNC: 0.14 NG/ML (ref 0–0.04)
TROPONIN I SERPL-MCNC: 0.21 NG/ML (ref 0–0.04)
WBC # BLD AUTO: 10.4 K/UL (ref 4.8–10.8)

## 2018-03-21 PROCEDURE — 92610 EVALUATE SWALLOWING FUNCTION: CPT

## 2018-03-21 PROCEDURE — 700101 HCHG RX REV CODE 250: Performed by: STUDENT IN AN ORGANIZED HEALTH CARE EDUCATION/TRAINING PROGRAM

## 2018-03-21 PROCEDURE — 700111 HCHG RX REV CODE 636 W/ 250 OVERRIDE (IP): Performed by: STUDENT IN AN ORGANIZED HEALTH CARE EDUCATION/TRAINING PROGRAM

## 2018-03-21 PROCEDURE — G8997 SWALLOW GOAL STATUS: HCPCS | Mod: CH

## 2018-03-21 PROCEDURE — 94640 AIRWAY INHALATION TREATMENT: CPT

## 2018-03-21 PROCEDURE — A9270 NON-COVERED ITEM OR SERVICE: HCPCS | Performed by: INTERNAL MEDICINE

## 2018-03-21 PROCEDURE — 700111 HCHG RX REV CODE 636 W/ 250 OVERRIDE (IP): Performed by: INTERNAL MEDICINE

## 2018-03-21 PROCEDURE — 700101 HCHG RX REV CODE 250: Performed by: EMERGENCY MEDICINE

## 2018-03-21 PROCEDURE — 83605 ASSAY OF LACTIC ACID: CPT

## 2018-03-21 PROCEDURE — A9270 NON-COVERED ITEM OR SERVICE: HCPCS | Performed by: STUDENT IN AN ORGANIZED HEALTH CARE EDUCATION/TRAINING PROGRAM

## 2018-03-21 PROCEDURE — B543ZZA ULTRASONOGRAPHY OF RIGHT JUGULAR VEINS, GUIDANCE: ICD-10-PCS | Performed by: INTERNAL MEDICINE

## 2018-03-21 PROCEDURE — G8996 SWALLOW CURRENT STATUS: HCPCS | Mod: CJ

## 2018-03-21 PROCEDURE — 02HV33Z INSERTION OF INFUSION DEVICE INTO SUPERIOR VENA CAVA, PERCUTANEOUS APPROACH: ICD-10-PCS | Performed by: INTERNAL MEDICINE

## 2018-03-21 PROCEDURE — 700105 HCHG RX REV CODE 258: Performed by: INTERNAL MEDICINE

## 2018-03-21 PROCEDURE — 80053 COMPREHEN METABOLIC PANEL: CPT

## 2018-03-21 PROCEDURE — 96366 THER/PROPH/DIAG IV INF ADDON: CPT

## 2018-03-21 PROCEDURE — C1751 CATH, INF, PER/CENT/MIDLINE: HCPCS

## 2018-03-21 PROCEDURE — 770022 HCHG ROOM/CARE - ICU (200)

## 2018-03-21 PROCEDURE — 93306 TTE W/DOPPLER COMPLETE: CPT | Mod: 26 | Performed by: INTERNAL MEDICINE

## 2018-03-21 PROCEDURE — 85730 THROMBOPLASTIN TIME PARTIAL: CPT

## 2018-03-21 PROCEDURE — 84484 ASSAY OF TROPONIN QUANT: CPT

## 2018-03-21 PROCEDURE — 700102 HCHG RX REV CODE 250 W/ 637 OVERRIDE(OP): Performed by: STUDENT IN AN ORGANIZED HEALTH CARE EDUCATION/TRAINING PROGRAM

## 2018-03-21 PROCEDURE — 700105 HCHG RX REV CODE 258: Performed by: EMERGENCY MEDICINE

## 2018-03-21 PROCEDURE — 71275 CT ANGIOGRAPHY CHEST: CPT

## 2018-03-21 PROCEDURE — 85025 COMPLETE CBC W/AUTO DIFF WBC: CPT

## 2018-03-21 PROCEDURE — 700105 HCHG RX REV CODE 258: Performed by: STUDENT IN AN ORGANIZED HEALTH CARE EDUCATION/TRAINING PROGRAM

## 2018-03-21 PROCEDURE — 700117 HCHG RX CONTRAST REV CODE 255: Performed by: INTERNAL MEDICINE

## 2018-03-21 PROCEDURE — 93306 TTE W/DOPPLER COMPLETE: CPT

## 2018-03-21 PROCEDURE — 36556 INSERT NON-TUNNEL CV CATH: CPT

## 2018-03-21 PROCEDURE — 96372 THER/PROPH/DIAG INJ SC/IM: CPT

## 2018-03-21 PROCEDURE — 85610 PROTHROMBIN TIME: CPT

## 2018-03-21 PROCEDURE — 700102 HCHG RX REV CODE 250 W/ 637 OVERRIDE(OP): Performed by: INTERNAL MEDICINE

## 2018-03-21 PROCEDURE — 82962 GLUCOSE BLOOD TEST: CPT

## 2018-03-21 RX ORDER — METHYLPREDNISOLONE SODIUM SUCCINATE 40 MG/ML
40 INJECTION, POWDER, LYOPHILIZED, FOR SOLUTION INTRAMUSCULAR; INTRAVENOUS EVERY 8 HOURS
Status: COMPLETED | OUTPATIENT
Start: 2018-03-21 | End: 2018-03-21

## 2018-03-21 RX ORDER — SODIUM CHLORIDE 9 MG/ML
1000 INJECTION, SOLUTION INTRAVENOUS ONCE
Status: COMPLETED | OUTPATIENT
Start: 2018-03-21 | End: 2018-03-21

## 2018-03-21 RX ORDER — OMEPRAZOLE 20 MG/1
20 CAPSULE, DELAYED RELEASE ORAL DAILY
Status: DISCONTINUED | OUTPATIENT
Start: 2018-03-21 | End: 2018-03-22

## 2018-03-21 RX ORDER — SODIUM CHLORIDE 9 MG/ML
500 INJECTION, SOLUTION INTRAVENOUS
Status: COMPLETED | OUTPATIENT
Start: 2018-03-21 | End: 2018-03-21

## 2018-03-21 RX ORDER — AZITHROMYCIN 250 MG/1
250 TABLET, FILM COATED ORAL DAILY
Status: DISCONTINUED | OUTPATIENT
Start: 2018-03-22 | End: 2018-03-22

## 2018-03-21 RX ORDER — LAMOTRIGINE 100 MG/1
200 TABLET ORAL DAILY
Status: DISCONTINUED | OUTPATIENT
Start: 2018-03-21 | End: 2018-03-22

## 2018-03-21 RX ORDER — HEPARIN SODIUM 5000 [USP'U]/ML
5000 INJECTION, SOLUTION INTRAVENOUS; SUBCUTANEOUS EVERY 8 HOURS
Status: DISCONTINUED | OUTPATIENT
Start: 2018-03-21 | End: 2018-03-22

## 2018-03-21 RX ORDER — AZITHROMYCIN 250 MG/1
250 TABLET, FILM COATED ORAL DAILY
Status: DISCONTINUED | OUTPATIENT
Start: 2018-03-22 | End: 2018-03-21

## 2018-03-21 RX ORDER — IPRATROPIUM BROMIDE AND ALBUTEROL SULFATE 2.5; .5 MG/3ML; MG/3ML
3 SOLUTION RESPIRATORY (INHALATION)
Status: COMPLETED | OUTPATIENT
Start: 2018-03-21 | End: 2018-03-21

## 2018-03-21 RX ORDER — TIOTROPIUM BROMIDE 18 UG/1
1 CAPSULE ORAL; RESPIRATORY (INHALATION)
Status: DISCONTINUED | OUTPATIENT
Start: 2018-03-21 | End: 2018-03-21

## 2018-03-21 RX ORDER — LOSARTAN POTASSIUM 50 MG/1
50 TABLET ORAL
Status: DISCONTINUED | OUTPATIENT
Start: 2018-03-21 | End: 2018-03-21

## 2018-03-21 RX ORDER — BUDESONIDE 0.5 MG/2ML
0.5 INHALANT ORAL
Status: DISCONTINUED | OUTPATIENT
Start: 2018-03-21 | End: 2018-03-22

## 2018-03-21 RX ORDER — HYDROCHLOROTHIAZIDE 12.5 MG/1
12.5 TABLET ORAL
Status: DISCONTINUED | OUTPATIENT
Start: 2018-03-21 | End: 2018-03-21

## 2018-03-21 RX ORDER — DEXTROSE MONOHYDRATE 25 G/50ML
25 INJECTION, SOLUTION INTRAVENOUS
Status: DISCONTINUED | OUTPATIENT
Start: 2018-03-21 | End: 2018-03-22

## 2018-03-21 RX ORDER — ALBUTEROL SULFATE 90 UG/1
2 AEROSOL, METERED RESPIRATORY (INHALATION)
Status: DISCONTINUED | OUTPATIENT
Start: 2018-03-21 | End: 2018-03-22

## 2018-03-21 RX ORDER — LEVOTHYROXINE SODIUM 88 UG/1
88 TABLET ORAL
Status: DISCONTINUED | OUTPATIENT
Start: 2018-03-21 | End: 2018-03-22

## 2018-03-21 RX ORDER — TIOTROPIUM BROMIDE 18 UG/1
1 CAPSULE ORAL; RESPIRATORY (INHALATION)
Status: DISCONTINUED | OUTPATIENT
Start: 2018-03-21 | End: 2018-03-22

## 2018-03-21 RX ORDER — BENZONATATE 100 MG/1
100 CAPSULE ORAL 3 TIMES DAILY PRN
Status: DISCONTINUED | OUTPATIENT
Start: 2018-03-21 | End: 2018-03-21

## 2018-03-21 RX ORDER — SODIUM CHLORIDE 9 MG/ML
30 INJECTION, SOLUTION INTRAVENOUS
Status: DISCONTINUED | OUTPATIENT
Start: 2018-03-21 | End: 2018-03-21

## 2018-03-21 RX ORDER — PREDNISONE 20 MG/1
40 TABLET ORAL DAILY
Status: DISCONTINUED | OUTPATIENT
Start: 2018-03-21 | End: 2018-03-21

## 2018-03-21 RX ORDER — PREDNISONE 20 MG/1
40 TABLET ORAL DAILY
Status: DISCONTINUED | OUTPATIENT
Start: 2018-03-22 | End: 2018-03-22

## 2018-03-21 RX ADMIN — SODIUM CHLORIDE 500 ML: 9 INJECTION, SOLUTION INTRAVENOUS at 21:49

## 2018-03-21 RX ADMIN — OMEPRAZOLE 20 MG: 20 CAPSULE, DELAYED RELEASE ORAL at 08:52

## 2018-03-21 RX ADMIN — SODIUM CHLORIDE: 9 INJECTION, SOLUTION INTRAVENOUS at 08:42

## 2018-03-21 RX ADMIN — AMPICILLIN SODIUM AND SULBACTAM SODIUM 3 G: 2; 1 INJECTION, POWDER, FOR SOLUTION INTRAMUSCULAR; INTRAVENOUS at 20:01

## 2018-03-21 RX ADMIN — FLUTICASONE PROPIONATE 50 MCG: 50 SPRAY, METERED NASAL at 08:52

## 2018-03-21 RX ADMIN — SODIUM CHLORIDE: 9 INJECTION, SOLUTION INTRAVENOUS at 20:07

## 2018-03-21 RX ADMIN — HEPARIN SODIUM 5000 UNITS: 5000 INJECTION, SOLUTION INTRAVENOUS; SUBCUTANEOUS at 13:11

## 2018-03-21 RX ADMIN — BUDESONIDE 0.5 MG: 0.5 SUSPENSION RESPIRATORY (INHALATION) at 22:51

## 2018-03-21 RX ADMIN — IPRATROPIUM BROMIDE AND ALBUTEROL SULFATE 3 ML: .5; 3 SOLUTION RESPIRATORY (INHALATION) at 00:17

## 2018-03-21 RX ADMIN — HEPARIN SODIUM 5000 UNITS: 5000 INJECTION, SOLUTION INTRAVENOUS; SUBCUTANEOUS at 05:16

## 2018-03-21 RX ADMIN — IOHEXOL 60 ML: 350 INJECTION, SOLUTION INTRAVENOUS at 04:06

## 2018-03-21 RX ADMIN — STANDARDIZED SENNA CONCENTRATE AND DOCUSATE SODIUM 2 TABLET: 8.6; 5 TABLET, FILM COATED ORAL at 20:00

## 2018-03-21 RX ADMIN — METHYLPREDNISOLONE SODIUM SUCCINATE 40 MG: 40 INJECTION, POWDER, FOR SOLUTION INTRAMUSCULAR; INTRAVENOUS at 20:04

## 2018-03-21 RX ADMIN — AMPICILLIN SODIUM AND SULBACTAM SODIUM 3 G: 2; 1 INJECTION, POWDER, FOR SOLUTION INTRAMUSCULAR; INTRAVENOUS at 08:50

## 2018-03-21 RX ADMIN — METHYLPREDNISOLONE SODIUM SUCCINATE 40 MG: 40 INJECTION, POWDER, FOR SOLUTION INTRAMUSCULAR; INTRAVENOUS at 13:11

## 2018-03-21 RX ADMIN — OXYCODONE HYDROCHLORIDE AND ACETAMINOPHEN 1 TABLET: 10; 325 TABLET ORAL at 04:13

## 2018-03-21 RX ADMIN — OXYCODONE HYDROCHLORIDE AND ACETAMINOPHEN 1 TABLET: 10; 325 TABLET ORAL at 20:31

## 2018-03-21 RX ADMIN — GUAIFENESIN AND DEXTROMETHORPHAN 10 ML: 100; 10 SYRUP ORAL at 20:31

## 2018-03-21 RX ADMIN — IPRATROPIUM BROMIDE AND ALBUTEROL SULFATE 3 ML: .5; 3 SOLUTION RESPIRATORY (INHALATION) at 11:50

## 2018-03-21 RX ADMIN — IPRATROPIUM BROMIDE AND ALBUTEROL SULFATE 3 ML: .5; 3 SOLUTION RESPIRATORY (INHALATION) at 22:51

## 2018-03-21 RX ADMIN — STANDARDIZED SENNA CONCENTRATE AND DOCUSATE SODIUM 2 TABLET: 8.6; 5 TABLET, FILM COATED ORAL at 08:51

## 2018-03-21 RX ADMIN — OXYCODONE HYDROCHLORIDE AND ACETAMINOPHEN 1 TABLET: 10; 325 TABLET ORAL at 13:12

## 2018-03-21 RX ADMIN — FLUTICASONE PROPIONATE 50 MCG: 50 SPRAY, METERED NASAL at 20:01

## 2018-03-21 RX ADMIN — IPRATROPIUM BROMIDE AND ALBUTEROL SULFATE 3 ML: .5; 3 SOLUTION RESPIRATORY (INHALATION) at 09:11

## 2018-03-21 RX ADMIN — HEPARIN SODIUM 5000 UNITS: 5000 INJECTION, SOLUTION INTRAVENOUS; SUBCUTANEOUS at 20:01

## 2018-03-21 RX ADMIN — AMPICILLIN SODIUM AND SULBACTAM SODIUM 3 G: 2; 1 INJECTION, POWDER, FOR SOLUTION INTRAMUSCULAR; INTRAVENOUS at 04:14

## 2018-03-21 RX ADMIN — TIOTROPIUM BROMIDE 1 CAPSULE: 18 CAPSULE ORAL; RESPIRATORY (INHALATION) at 09:19

## 2018-03-21 RX ADMIN — AMPICILLIN SODIUM AND SULBACTAM SODIUM 3 G: 2; 1 INJECTION, POWDER, FOR SOLUTION INTRAMUSCULAR; INTRAVENOUS at 15:50

## 2018-03-21 RX ADMIN — AZITHROMYCIN FOR INJECTION INJECTION, POWDER, LYOPHILIZED, FOR SOLUTION 500 MG: 500 INJECTION INTRAVENOUS at 08:50

## 2018-03-21 RX ADMIN — IPRATROPIUM BROMIDE AND ALBUTEROL SULFATE 3 ML: .5; 3 SOLUTION RESPIRATORY (INHALATION) at 19:55

## 2018-03-21 RX ADMIN — ASPIRIN 81 MG: 81 TABLET, CHEWABLE ORAL at 08:51

## 2018-03-21 RX ADMIN — PREDNISONE 40 MG: 20 TABLET ORAL at 08:52

## 2018-03-21 RX ADMIN — DOXYCYCLINE 100 MG: 100 TABLET ORAL at 01:35

## 2018-03-21 RX ADMIN — SODIUM CHLORIDE 1000 ML: 9 INJECTION, SOLUTION INTRAVENOUS at 00:07

## 2018-03-21 RX ADMIN — DULOXETINE HYDROCHLORIDE 60 MG: 60 CAPSULE, DELAYED RELEASE ORAL at 08:51

## 2018-03-21 RX ADMIN — ROSUVASTATIN CALCIUM 10 MG: 20 TABLET, FILM COATED ORAL at 01:35

## 2018-03-21 RX ADMIN — LAMOTRIGINE 200 MG: 100 TABLET ORAL at 13:11

## 2018-03-21 RX ADMIN — IPRATROPIUM BROMIDE AND ALBUTEROL SULFATE 3 ML: .5; 3 SOLUTION RESPIRATORY (INHALATION) at 16:38

## 2018-03-21 RX ADMIN — NICOTINE POLACRILEX 2 MG: 2 GUM, CHEWING BUCCAL at 04:13

## 2018-03-21 RX ADMIN — INSULIN HUMAN 6 UNITS: 100 INJECTION, SOLUTION PARENTERAL at 21:31

## 2018-03-21 RX ADMIN — ROSUVASTATIN CALCIUM 10 MG: 20 TABLET, FILM COATED ORAL at 20:01

## 2018-03-21 RX ADMIN — LEVOTHYROXINE SODIUM 88 MCG: 88 TABLET ORAL at 08:52

## 2018-03-21 ASSESSMENT — ENCOUNTER SYMPTOMS
FOCAL WEAKNESS: 0
SPUTUM PRODUCTION: 1
ABDOMINAL PAIN: 0
COUGH: 1
DEPRESSION: 0
SINUS PAIN: 0
WEIGHT LOSS: 1
SHORTNESS OF BREATH: 1
EYE DISCHARGE: 0
WEAKNESS: 1
EYE PAIN: 0
FALLS: 0
HEADACHES: 0
CHILLS: 1
LOSS OF CONSCIOUSNESS: 0
SPEECH CHANGE: 0
FEVER: 1
PALPITATIONS: 0
BACK PAIN: 1
HEMOPTYSIS: 0
VOMITING: 0
SEIZURES: 0
SORE THROAT: 0
WHEEZING: 1
NAUSEA: 0
DIARRHEA: 0

## 2018-03-21 ASSESSMENT — PAIN SCALES - GENERAL
PAINLEVEL_OUTOF10: 0
PAINLEVEL_OUTOF10: 7
PAINLEVEL_OUTOF10: 4
PAINLEVEL_OUTOF10: 0
PAINLEVEL_OUTOF10: 4
PAINLEVEL_OUTOF10: 6

## 2018-03-21 ASSESSMENT — PATIENT HEALTH QUESTIONNAIRE - PHQ9
1. LITTLE INTEREST OR PLEASURE IN DOING THINGS: NOT AT ALL
SUM OF ALL RESPONSES TO PHQ9 QUESTIONS 1 AND 2: 0
2. FEELING DOWN, DEPRESSED, IRRITABLE, OR HOPELESS: NOT AT ALL
2. FEELING DOWN, DEPRESSED, IRRITABLE, OR HOPELESS: NOT AT ALL
SUM OF ALL RESPONSES TO PHQ9 QUESTIONS 1 AND 2: 0
1. LITTLE INTEREST OR PLEASURE IN DOING THINGS: NOT AT ALL

## 2018-03-21 ASSESSMENT — LIFESTYLE VARIABLES
SUBSTANCE_ABUSE: 0
EVER_SMOKED: YES
DO YOU DRINK ALCOHOL: NO
DO YOU DRINK ALCOHOL: NO

## 2018-03-21 NOTE — ED NOTES
CT-CTA CHEST PULMONARY ARTERY W/ RECONS (Edited Result - FINAL)   Result time 03/21/18 04:34:53   Addendum 1 of 1    Right central venous catheter with tip in the mid SVC.

## 2018-03-21 NOTE — H&P
DATE OF ADMISSION:  03/20/2018    CHIEF COMPLAINT:  Shortness of breath.    HISTORY OF PRESENT ILLNESS:  The patient is a 77-year-old female with a past   medical history significant for coronary arterial disease status post CABG,   lung cancer status post lobectomy, and non-oxygen dependent COPD, who   presented to the emergency department, sent from an urgent care after found to   have an oxygen saturation of 70% on room air.  For the last 2 weeks, patient   has had increasing gradual worsening of shortness of breath, productive cough,   generalized weakness, and malaise.  She was treated with a Z-Rogelio from an   urgent care visit for 3 days, but when not improving, returned to the urgent   care today where she was found to have the significant hypoxia.  She was given   nebulized treatment en route to the ER.  In the emergency department, patient   was treated for COPD exacerbation and given antibiotics and 2 liters of   crystalloid.  After the fluids, patient's blood pressure dropped even more   from the one-teens down into the 80s.  Of note, her initial blood pressure   upon arrival was 69/50.  She has been admitted to the intensive care unit for   septic shock from a pulmonary source.    PAST MEDICAL HISTORY:  1.  Coronary arterial disease.  2.  Non-oxygen dependent COPD.  3.  Lung cancer, status post lobectomy.  4.  Anxiety.  5.  Osteoarthritis.  6.  Depression.  7.  Non-insulin-dependent diabetes.  8.  Hypertension.  9.  Hyperlipidemia.  10.  Peripheral arterial disease.  11.  Hypothyroidism.    ALLERGIES:  TO CODEINE, LATEX, SULFA, AND TAPE.    OUTPATIENT MEDICATIONS:  Includes Z-Rogelio, Hyzaar, Spiriva, Pletal, Lamictal,   Flonase, Abilify, Synthroid, Crestor, Ativan, albuterol, diclofenac, Cymbalta,   Lasix, potassium chloride, metformin, Percocet, and aspirin.    PAST SURGICAL HISTORY:  Includes:  1.  CABG x1 back in 2008.  2.  Carotid endarterectomy.  3.  Left femur fracture repair.  4.  Spinal surgery.  5.   Left upper lobe lobectomy in 2008 for lung cancer.  6.  Appendectomy.  7.  Hysterectomy.    SOCIAL HISTORY:  Remarkable for tobacco use, continues to smoke 1 pack a day   for the last 55 years.  Negative alcohol, negative illicit drugs, and she is   .    FAMILY HISTORY:  Remarkable for diabetes, coronary arterial disease, and   hypertension.    REVIEW OF SYSTEMS:  Please see history of present illness, otherwise positive   for subjective fevers, chills.  No recent sick contacts.  No chest pain, no   hemoptysis.  No lower extremity edema.  No syncope, no palpitations.  No calf   pain.  Remainder review of systems is negative.    PHYSICAL EXAMINATION:  VITAL SIGNS:  Temperature afebrile, heart rate of 105, respiratory rate of 18,   oxygen saturation 96% on 4 L facemask, and blood pressure 87/41.  GENERAL:  Thin elderly frail female, in mild respiratory distress.  HEENT:  Normocephalic, atraumatic.  Pupils are equal, round and reactive to   light without scleral icterus nor conjunctival pallor.  Dry oral mucosal   membranes without oropharyngeal erythema.  NECK:  Supple.  Trachea is midline.  There is no stridor nor jugular venous   distention.  She has flat neck veins.  CARDIOVASCULAR:  Tachycardic with a holosystolic murmur in the left sternal   border.  Sternotomy scar noted.  Laterally displaced PMI.  Radial pulses are   1+ and symmetric with brisk capillary refill.  PULMONARY:  Diminished breath sounds in the bases with mild expiratory wheeze,   mildly tachypneic, but not using accessory muscles, unable to speak in full   sentences.  Increased AP diameter of the chest.  ABDOMEN:  Soft, nontender, nondistended.  Normal bowel sounds.  GENITOURINARY:  Deferred.  EXTREMITIES:  Mild clubbing, no cyanosis, no edema.  No calf asymmetry.  No   palpable cord.  NEUROLOGIC:  Alert and oriented x4.  Cranial nerves II-XII are intact.  No   focal deficits.  Generalized weakness.  SKIN:  Warm, pink, and dry without  lesions or rash.    LABORATORY DATA:  CBC with a white count of 13,000, hemoglobin of 14,   platelets of 257 with a left shift on the differential.  Chemistries   remarkable for sodium of 129, chloride of 94, BUN of 20, creatinine 0.78,   glucose 135, calcium 9.2.  LFTs normal except for an AST of 54, alkaline   phosphatase 101, albumin of 3.5.  Lactic acid went from 1.7 up to 2.2.    Troponin of 0.21 with a brain natriuretic peptide of 312.  Urinalysis showing   small occult blood with 300 protein, otherwise normal.  Procalcitonin is 0.18.    Influenza screen is negative.    IMAGIN.  Chest x-ray showing emphysema with interstitial infiltrates, left upper   lobe nodule, sternotomy wires noted.  2.  EKG, sinus tachycardia with a rate of 109 with left atrial enlargement,   right axis deviation and a QTC interval normal at 475.    ASSESSMENT:  1.  Acute hypoxemic respiratory failure.  2.  Acute exacerbation of non-oxygen dependent chronic obstructive pulmonary   disease.  3.  Septic shock from pulmonary source with associated cardiac failure and   lactic acidosis.  4.  Intravascular volume depletion.  5.  Hypovolemic hyponatremia.  6.  Mild elevation in liver enzymes of unclear etiology.  7.  Elevated troponin, likely secondary to demand ischemia.  8.  Coronary arterial disease.  9.  History of systemic arterial hypertension.  10.  Hyperlipidemia.  11.  Depression.    PLAN:  Patient is critically ill at this time and will be admitted to the   intensive care unit for close observation and management.  I will continue her   on the sepsis protocol with ongoing fluid resuscitation targeting a mean   arterial pressure greater than 65 and normalization of her lactate and   adequate urine output.  She will be continued on antibiotics to cover for   community-acquired pneumonia with blood and sputum cultures to better guide   that therapy.  I will continue her on systemic steroids as well as scheduled   and p.r.n.  bronchodilators for her COPD exacerbation.  Although her   hypotension, elevated troponin, lactic acid level, and tachycardia may all be   explained from the pneumonia and sepsis, a pulmonary embolism is also on the   differential.  Unfortunately, a D-dimer will not be specific enough in her   condition and we will thus perform a CT angio of her lungs to rule out   pulmonary embolism, which will also better evaluate her left upper lobe nodule   seen on plain chest x-ray.  I will trend her troponin and an echocardiogram   will be ordered as well.  A central line will be placed to monitor central   venous pressures targeting a goal of 8-10 with the fluids and through which we   can initiate a norepinephrine infusion to maintain an adequate blood   pressure, should the fluid resuscitation continued to be unsuccessful.  She   will be placed on DVT prophylaxis per ICU protocol.  Patient is critically ill   at this time and I appreciate the opportunity to assist in her care and we   will continue to follow along closely with you.    Critical care time 35 minutes.  No time overlap.  Procedures are not included   in this time.    [id number].       ____________________________________     Jeremy Gonda, MD JG / DEANA    DD:  03/21/2018 03:37:22  DT:  03/21/2018 05:15:31    D#:  [id number]  Job#:  [id number]

## 2018-03-21 NOTE — THERAPY
"Speech Language Therapy Clinical Swallow Evaluation completed.  Functional Status: Clinical swallow evaluation completed on this date.  Patient pleasant and agreeable.  She followed directives to the oral [facility] exam with no gross deficits appreciated.  Presentation of PO included ice chips, thins, purees, and soft solids with mixed consistencies. The patient presented with functional mastication and bolus manipulation, timely initiation of swallow trigger and adequate laryngeal elevation upon palpation.  She had choking with mixed consistencies x2 and with a third bite the patient stated, \"I feel it trying to go wrong but I won't let it.\" She had no overt s/sx of aspiration with soft solids or any other consistency consumed.  RN at bedside who passed pills whole and floated in puree without difficulty. At this time, recommend diet downgrade to dysphagia 2 with thin liquids with direct supervision and assistance with managing oxymask during PO intake.  Please hold PO intake with any difficulty or s/sx of aspiration. SLP following closely   Recommendations - Diet:  Dysphagia 2/thins                          Strategies: Direct supervision during meals, No Straws and Head of Bed at 90 Degrees                          Medication Administration:    Plan of Care: Will benefit from Speech Therapy 3 times per week  Post-Acute Therapy: To be determined  See \"Rehab Therapy-Acute\" Patient Summary Report for complete documentation.           "

## 2018-03-21 NOTE — PROGRESS NOTES
[facility] ICU Progress Note      Admit Date: 3/20/2018  ICU Day: 2    Resident(s): Dionisio Miller  Attending: JULIEN FELDMAN/ Dr. Claire    Date & Time:   3/21/2018   2:46 PM       Patient ID:    Name:             Mia Hammond   YOB: 1940  Age:                 77 y.o.  female   MRN:               [id number]    ID:  77 year old female with a PMH of current tobacco use, COPD not on home 02 lung cancer s/p lobectomy, coronary artery disease s/p CABG presented to the ED on 3/20/2018 with complaints of SOB, cough productive of green sputum. Patient was seen at urgent care 3 days prior to admit and placed on azithromycin which did not relieve the patient's symptoms. Her symptoms continued to worsen and returned to urgent care. She was found to be hypoxic on room air Sp02 in 70's and was sent to the ED here at [facility]. Patient was found to be hypotensive in the ED and after 1.6L IV fluid bolus per sepsis protocol her blood pressure remained low. At that time a central line in right IJ was placed for pressor therapy which she only required briefly in the ICU. Oxygen requirement's increased from 4 L nasal cannula to 4 L oxymask and lung auscultation revealed diffuse scattered wheezing. She was initiated on IV unasyn and azithromycin and started on IV steroids. Patient was placed in ICU for pressors and close observation.    Consultants:  PMA: Dr. Claire    Interval Events:  SOB feels improved this AM, less wheezing, less cough  Son at bedside  Levophed required only briefly, now off  Long discussion regarding code status. Patient amenable to intubation if needed, but did not want chest compression or defibrillation  NSR, sinus tachy overnight on tele  Good urine output  Lost 15 lbs the past few years, hx night sweats      Review of Systems   Constitutional: Positive for chills, fever, malaise/fatigue and weight loss.   Respiratory: Positive for cough, sputum production, shortness of breath and wheezing.  "Negative for hemoptysis.    Cardiovascular: Negative for chest pain, palpitations and leg swelling.   Gastrointestinal: Negative for nausea and vomiting.   Musculoskeletal: Positive for back pain. Negative for falls.   Neurological: Negative for speech change, focal weakness, loss of consciousness and headaches.       PHYSICAL EXAM  Vitals:    03/21/18 1000 03/21/18 1100 03/21/18 1200 03/21/18 1204   BP:       Pulse: (!) 118 (!) 104 (!) 104 (!) 104   Resp: (!) 22 (!) 22 (!) 22 (!) 28   Temp: 36.7 °C (98 °F)  36.8 °C (98.2 °F)    SpO2: 90% 92% 88% 92%   Weight:       Height:         Body mass index is 21.86 kg/m².  BP (!) 69/50   Pulse (!) 104   Temp 36.8 °C (98.2 °F)   Resp (!) 28   Ht 1.499 m (4' 11\")   Wt 49.1 kg (108 lb 3.9 oz)   SpO2 92%   BMI 21.86 kg/m²   O2 therapy: Pulse Oximetry: 92 %, O2 (LPM): 5, O2 Delivery: Silicone Nasal Cannula    Physical Exam  Constitutional: She is oriented to person, place, and time. No distress.   Head: Normocephalic and atraumatic.   Central line placed on RIJ   Eyes: Pupils are equal, round, and reactive to light. Right eye exhibits no discharge. Left eye exhibits no discharge. No scleral icterus.   Neck: No JVD present. No tracheal deviation present.   Cardiovascular: Regular rhythm and normal heart sounds. Murmur noted.  Pulmonary/Chest: Diffusely decreased breath sounds. Effort normal. No stridor. She has rales. She exhibits no tenderness. Mild clubbing.  Abdominal: There is no tenderness. There is no rebound and no guarding.   Musculoskeletal: No edema. She exhibits no tenderness.   Neurological: She is alert and oriented to person, place, and time.   Skin: Rash noted. She is not diaphoretic.       Respiratory:     Respiration: (!) 28, Pulse Oximetry: 92 %, O2 Daily Delivery Respiratory : Silicone Nasal Cannula    Chest Tube Drains:          Invalid input(s): [id number]    HemoDynamics:  Pulse: (!) 104, Heart Rate (Monitored): (!) 103 Blood Pressure : (!) 69/50, " NIBP: 113/61        Fluids:    Date 18 0700 - 18 0659   Shift [id number] 8558-5226 8030-0659 24 Hour Total   I  N  T  A  K  E   P.O. 480   480      P.O. 480   480    I.V. 0   0      Norepinephrine Volume 0   0    Shift Total 480   480   O  U  T  P  U  T   Urine 900   900      Number of Times Voided 3 x   3 x      Void (ml) 900   900    Stool          Number of Times Stooled 1 x   1 x    Shift Total 900   900   NET -420   -420        Intake/Output Summary (Last 24 hours) at 18 1446  Last data filed at 18 1200   Gross per 24 hour   Intake              480 ml   Output              900 ml   Net             -420 ml       Weight: 49.1 kg (108 lb 3.9 oz)  Body mass index is 21.86 kg/m².    Recent Labs      18   0445   SODIUM  129*  135   POTASSIUM  4.1  3.4*   CHLORIDE  94*  101   CO2  25  25   BUN  20  11   CREATININE  0.78  0.59   CALCIUM  9.2  8.0*       GI/Nutrition:  Recent Labs      185  18   0445   ALTSGPT  20   --   16   ASTSGOT  54*   --   41   ALKPHOSPHAT  101*   --   88   TBILIRUBIN  0.4   --   0.3   GAMMAGT   --   70*   --    GLUCOSE  135*   --   197*       Heme:  Recent Labs      18   0445   RBC  4.30  3.62*   HEMOGLOBIN  13.8  11.6*   HEMATOCRIT  40.6  34.2*   PLATELETCT  257  240   PROTHROMBTM   --   13.1   APTT   --   30.1   INR   --   1.02       Infectious Disease:  Temp  Av.8 °C (98.3 °F)  Min: 36.6 °C (97.8 °F)  Max: 37.4 °C (99.3 °F)  Recent Labs      18   0445   WBC  12.5*  10.4   NEUTSPOLYS  81.90*  94.50*   LYMPHOCYTES  10.40*  3.80*   MONOCYTES  6.80  1.10   EOSINOPHILS  0.10  0.00   BASOPHILS  0.30  0.10   ASTSGOT  54*  41   ALTSGPT  20  16   ALKPHOSPHAT  101*  88   TBILIRUBIN  0.4  0.3       Meds:  • NORepinephrine (LEVOPHED) infusion  0-30 mcg/min Stopped (18 2989)   • heparin  5,000 Units     • NS  500 mL     • albuterol  2 Puff     • lamotrigine  200 mg     •  levothyroxine  88 mcg     • glucose 4 g  16 g      And   • dextrose 50%  25 mL     • tiotropium  1 Cap     • omeprazole  20 mg     • [START ON 3/22/2018] azithromycin  250 mg     • methylPREDNISolone  40 mg     • [START ON 3/22/2018] predniSONE  40 mg     • senna-docusate  2 Tab      And   • polyethylene glycol/lytes  1 Packet      And   • magnesium hydroxide  30 mL      And   • bisacodyl  10 mg     • Respiratory Care per Protocol       • guaiFENesin dextromethorphan  10 mL     • acetaminophen  650 mg     • NS   75 mL/hr at 03/21/18 0842   • ampicillin-sulbactam (UNASYN) IV  3 g Stopped (03/21/18 0920)   • ipratropium-albuterol  3 mL     • aripiprazole  5 mg     • aspirin  81 mg     • DULoxetine  60 mg     • fluticasone  1 Spray     • rosuvastatin  10 mg     • oxyCODONE-acetaminophen  1 Tab          Procedures:  Right IJ central catheter 3/21    Imaging:  ECHOCARDIOGRAM COMP W/O CONT   Final Result      CT-CTA CHEST PULMONARY ARTERY W/ RECONS   Final Result   Addendum 1 of 1   Right central venous catheter with tip in the mid SVC.      Final         1. No CT evidence of pulmonary embolism.      2. Groundglass and airspace opacities in the left lingula and left lower lobe. Small areas of peripheral groundglass opacities in the right lung as well. These are concerning for multifocal pneumonia. The largest area is in the left lower lobe.      3. Postsurgical change in the left upper lobe with nodular opacities. Comparison to prior study could be helpful for interval change.          Problem and Plan:      * Septic shock (CMS-HCC)- (present on admission)   Assessment & Plan    - SIRS 3/4 on admit (left shift, HR, RR)  -   Presented with SOB, cough productive green sputum  -   Hx COPD 2/2 tobacco, lung cancer s/p lobectomy 2008  -  Not on home O2 therapy  - Lactic acid mildly elevated, now resolved  - Levophed PRN, not currently requiring  - CXR mild interstitial prominence  - CT shows scattered groundglass opacities  -  Blood, urine, sputum cx pending  - Continue unasyn and azithromycin for now  - IV steroids today  - Continue gentle fluids today  - Tele  - Supplemental 02  - Echocardiogram with normal EF, pulm HTN           Acute hypoxemic respiratory failure (CMS-[facility])- (present on admission)   Assessment & Plan    - Hx COPD 2/2 tobacco use, cancer s/p lobectomy  - Echo shows pulm HTN  - Now requiring supplemental 02  - Duoneb PRN  - IV abx, IV steroids, duoneb PRN  - Respiratory protocol  - Patient's code status updated to reflect the fact that she is amenable to intubation if needed, but does not want chest compressions or defibrillation        Elevated troponin- (present on admission)   Assessment & Plan    - Negative for ischemic changes in the EKG, denies chest pain   - Trop was 0.21 on admit. Trending down.  - Likely demand ischemia, treat lung pathology  - Tele  - Monitor        CAP (community acquired pneumonia)- (present on admission)   Assessment & Plan    - CT shows scattered groundglass opacities that may indicate pneumonia  - Currently being treated with IV unasyn, azithromycin  - Monitor for response to treatment  - Procalcitonin WNL, WBC count normal, afebrile        COPD with acute exacerbation (CMS-HCC)- (present on admission)   Assessment & Plan    - 55 pack year tobacco history  - Continues to use tobacco  - Hypoxic on room, not on home 02  - Supplemental 02 PRN  - Azithromycin, unasyn  - Duoneb scheduled  - IS, expectorants  - Recommend tobacco cessation        History of lung cancer- (present on admission)   Assessment & Plan    - S/p right upper lobectomy in 2008 during same surgery CABG was performed. Performed at [facility].  - Not followed by oncology  - CT chest shows scattered opacities  - Continues to use tobacco        Hypothyroidism- (present on admission)   Assessment & Plan    - TSH WNL  - Continue home med        Diabetes mellitus type 2, controlled (CMS-HCC)- (present on  admission)   Assessment & Plan    - Held home metformin  - ISS/hypoglycemic protocol inpatient and monitor glucose level  - Last Hb A1C 6.8        Severe episode of recurrent major depressive disorder, without psychotic features (CMS-HCC)- (present on admission)   Assessment & Plan    - Continue home medications            DISPO: ICU    CODE STATUS: No chest compression, no defibrillations, ok to intubate of required    Quality Measures:  [place]cido Catheter: No  DVT Prophylaxis: Heparin  Ulcer Prophylaxis: Omeprazole  Antibiotics: IV unasyn, PO azithromycin (day 2)  Lines: PIV, Right IJ central line

## 2018-03-21 NOTE — ASSESSMENT & PLAN NOTE
"- 55 pack year tobacco history, current smoker  - End stage COPD, per palliative: hospice recommended  Plan:   - Please see \"acute hypoxemic respiratory failure\"  "

## 2018-03-21 NOTE — SENIOR ADMIT NOTE
Mr Hammond is a 77 year old female with history of COPD who presents with productive cough, shortness of breath and leukocytosis. This patient initially did quite well in the ED with stable blood pressure following weight based administration of normal saline per sepsis protocol; however, she then developed crackles on exam, rising lactate, worsening hypoxia and refractory hypotension despite weight based fluid resuscitation at which point the ICU team was consulted for admission. We will transfer care to the ICU.

## 2018-03-21 NOTE — ED NOTES
PT back from CT, no changes, VSS.   MAP >65 no interventions at this time.  Awaiting CL placement verification with PE Chest CT read

## 2018-03-21 NOTE — ED NOTES
Pt lactic resulted 2.2 after sepsis bolus of NS. Telephone orders for NS at 150 ml/hf. Fluid running as ordered.

## 2018-03-21 NOTE — ED PROVIDER NOTES
"ED Provider Note    Scribed for Yolanda Buckley M.D. by Hannah Escoto. 3/20/2018, 8:24 PM.    Primary care provider: SIOBHAN Cao  Means of arrival: EMS  History obtained from: Patient  History limited by: none    CHIEF COMPLAINT  Chief Complaint   Patient presents with   • Shortness of Breath     Pt reports cough and SOB for past two weeks. Productive cough with green sputum. Increased SOB today, went to Urgent Care. Urgent care found O2 was 70% on RA. Pt RA is 82%. Pt placed on 4L NC. Pt used albuterol inhaler 4 times at home, recieved 1 neb treatment at urgent care.   • Cough     Productive cough. Green sputum.   • Sweats     Reports \"hot and cold sweats\"       HPI  Mia Hammond is a 77 y.o. female who presents to the Emergency Department by EMS for a cough with green sputum production and associated shortness of breath and diaphoresis. Patient was seen at Urgent Care three days ago and treated with Azithromycin. She noted that her shortness of breath was increased today which prompted her visit to Urgent care. At Urgent care she was 70% on room air and was given a nebulizer breathing treatment which improved her oxygen saturation to 77%. Patient states the breathing treatment improved her shortness of breath as well.  She is a current smoker. Patient has history of hypertension and COPD. She takes Losartan. She has history of CABG. Patient is a current smoker. She is not on oxygen at home.     REVIEW OF SYSTEMS  See HPI for further details. All other systems are negative.   C.    PAST MEDICAL HISTORY   has a past medical history of Anxiety; Arthritis; Asthma; Cancer (CMS-[facility]) (2008); Cataract; COPD (chronic obstructive pulmonary disease) (CMS-HCC); Dental disorder; Depression (7/27/2010); Diabetes; Encounter for long-term (current) use of other medications; Hyperlipidemia (7/27/2010); Hypertension; Muscle disorder; PAD (peripheral artery disease) (CMS-HCC); Pain (12-10-15); S/P CABG " "(coronary artery bypass graft) (7/27/2010); Scoliosis; Thyroid disease; Tobacco dependence; Urinary bladder disorder; and Urinary incontinence.    SURGICAL HISTORY   has a past surgical history that includes multiple coronary artery bypass (2008); vaginal hysterectomy total; laminotomy; appendectomy; inj dx/ther agnt paravert facet joint, george* (3/21/2011); inj dx/ther agnt paravert facet joint, george* (4/4/2011); dest,paravertebral,l/s,single (4/11/2011); other orthopedic surgery (); lobectomy (2008); carotid endarterectomy (Left, 12/16/2015); and recovery (1/15/2016).    SOCIAL HISTORY  Social History   Substance Use Topics   • Smoking status: Current Every Day Smoker     Packs/day: 1.00     Years: 55.00     Types: Cigarettes     Start date: 1/1/1961   • Smokeless tobacco: Never Used      Comment: 1 PPD cigarettes daily   • Alcohol use No      History   Drug Use No       FAMILY HISTORY  Family History   Problem Relation Age of Onset   • Diabetes Father    • Diabetes Sister    • Diabetes Brother    • No Known Problems Mother    • Lung Disease Neg Hx    • Cancer Neg Hx    • Heart Disease Neg Hx    • Hypertension Neg Hx    • Hyperlipidemia Neg Hx    • Stroke Neg Hx    • Alcohol/Drug Neg Hx        CURRENT MEDICATIONS  Reviewed. See Encounter Summary.     ALLERGIES  Allergies   Allergen Reactions   • Codeine Vomiting   • Latex Hives   • Sulfa Drugs Hives   • Tape      Paper ok       PHYSICAL EXAM  VITAL SIGNS: BP (!) 69/50   Pulse 98   Temp 37.4 °C (99.3 °F)   Resp 18   Ht 1.473 m (4' 10\")   Wt 55 kg (121 lb 4.1 oz)   SpO2 94%   BMI 25.34 kg/m²    Pulse ox interpretation: I interpret this pulse ox as low normal on oxygen   Constitutional: Alert in mild distress  HENT: Normocephalic atraumatic, mildly dry mucus membranes  Eyes: PERR, Conjunctiva normal, Non-icteric.   Neck: Normal range of motion, No tenderness, Supple, No stridor.   Lymphatic: No lymphadenopathy noted.   Cardiovascular: Regular rate and " rhythm, no murmurs.   Thorax & Lungs: Diffuse course breath sounds bilaterally, crackles in left lower lobe, No respiratory distress, No wheezing, No chest tenderness.   Abdomen: Bowel sounds normal, Soft, No tenderness, No pulsatile masses. No peritoneal signs.  Skin: Warm, Dry, No erythema, No rash.   Back: No bony tenderness, No CVA tenderness.   Extremities: Intact distal pulses, 1+ pitting edema bilaterally, No tenderness, No cyanosis  Musculoskeletal: Good range of motion in all major joints. No tenderness to palpation or major deformities noted.   Neurologic: Alert and oriented, No focal deficits noted.       DIFFERENTIAL DIAGNOSIS AND WORK UP PLAN    8:24 PM Patient seen and examined at bedside. The patient presents with SOB and hypoxia and the differential diagnosis includes but is not limited to Viral syndrome , bronchitis , COPD exacerbation, community acquired  pneumonia, fluid overload component, ACS. Ordered for lactic acid, CBC, CMP, urinalysis, urine culture, blood culture, DX-chest to evaluate. Patient will be treated with IV fluids for dry mucus membranes and hypotension, 3g ampicillin 3g in NS 100ml, 500mg Zithromax, 3ml duoneb, 125mg solu-medrol for her symptoms.     DIAGNOSTIC STUDIES / PROCEDURES     LABS  CBC with an elevated white blood cell count left shift, CMP consistent with hyponatremia and hypochloremia and normal renal function, urinalysis without signs of infection 1st lactate normal repeat lactate elevated mildly elevated BNP at 312 with a positive troponin at 0.21  All labs were reviewed by me.      COURSE & MEDICAL DECISION MAKING  Pertinent Labs & Imaging studies reviewed. (See chart for details)    9:46 PM Recheck: Patient re-evaluated at [facility]. Patient's blood pressure has improved after IV fluids. Discussed patient's condition and treatment plan for admission. Patient's lab and radiology results discussed. The patient understood and is in agreement for admission.       9:51 PM  - I discussed the patient's case and the above findings with [facility] who agrees to admit the patient.    Patient is 77-year-old female presenting with COPD-like exacerbation and possible community-acquired bronchitis-like bacterial infection especially with worsening difficulty breathing hypoxia and elevated lactic acid. Patient will be admitted the hospital she is improved after 30/kg bolus started on antibiotics and is only requiring a few liters nasal cannula at this time.    12:13 AM Patient is hypotensive and will be treated with IV fluids.     12:33 AM  Finds him to the bedside stating that she is not improved after DuoNeb her lungs are sounding more wet at this time we will call to the IV fluid she's been receiving she continues to be hypotensive we'll place a central line was likely start patient believe that discussed with [facility] med team to have them discuss with [facility] ICU team    DISPOSITION:  Patient will be admitted to [facility] Internal [facility] in guarded condition.      FINAL IMPRESSION  1. COPD exacerbation  2. Bronchitis  3. Hyponatremia  4. Lactic acidosis  5. Hypotension, requiring pressors      IHannah (Scribe), am scribing for, and in the presence of, Yolanda Buckley M.D..    Electronically signed by: Hannah Escoto ([employer]), 3/20/2018    IYolanda M.D. personally performed the services described in this documentation, as scribed by Hannah Escoto in my presence, and it is both accurate and complete.    The note accurately reflects work and decisions made by me.  Yolanda Buckley  3/21/2018  12:55 AM    This dictation has been created using voice recognition software and/or scribmeir. The accuracy of the dictation is limited by the abilities of the software and the expertise of the scribes. I expect there may be some errors of grammar and possibly content. I made every attempt to manually correct the errors within my dictation. However, errors  related to voice recognition software and/or scribes may still exist and should be interpreted within the appropriate context.

## 2018-03-21 NOTE — H&P
Internal Medicine Admitting History and Physical    Note Author: Jayda Villalta M.D.       Name Mia Hammond     1940   Age/Sex 77 y.o. female   MRN [id number]   Code Status DNR/ patient can receive intubation for short time     After 5PM or if no immediate response to page, please call for cross-coverage  Attending/Team: Dr. Gonda See Patient List for primary contact information  Call [phone] to page      1nd Call - Day Sr. Resident (R2/R3):   Pawan       Chief Complaint:  Productive cough and shortness of breath for 2 weeks duration     HPI:  Mia Hammond is a 77 y.o. Smoker Female, Smoke one pack/day with past medical history of lung cancer status post-lobectomy, coronary artery disease status post open heart surgery, COPD not on home oxygen, she use albuterol, and Spiriva presented to the hospital after found hypoxic at the urgent care, she has a fever, cough with green color sputum over the last two weeks, worsening shortness of breath, patient received an Azithromycin three days ago at urgent care. Patient denies recent travel, chest pain, hemoptysis, flu test was negative. The patient admits having a sick family member her grandson who is now hospitalized due to respiratory issues.     Patient found hypotensive at the ED received a 2 L IV fluid per sepsis protocol however blood pressure remains low, central line in RIJ was placed for possible need to use pressor/ systolic blood pressure keeps fluctuate between 70s-90s, oxygen requirement increased from 4 L nasal cannula to 4 L mask, lung start become wet after giving the fluid, blood pressure was low then gradually improving to 90s/50s then drop again to 70s/40s. Her home baseline blood pressure 120s/80s patient is making good urine output, alert and oriented, no labored breathing, no increased effort, agreed for intubation if needed however the code status is DNR, a lactic acid increased compared with the first test at  the presentation despite giving the fluid.    Chest x-ray showed increase interstitial marking suggest possible atypical pneumonia however typical pneumonia can't exclude at the time of taking the first X-RAY  As the patient was dehydrated and likely changes will start to be more visible after improving the circulatory status,     The patient admitted to the ICU due to septic shock failed to respond to the IV fluid, worsening lung sounds, CT PE for the tachycardia with the risk of poor mobility, continue antibiotics and IV fluid for now      Review of Systems   Constitutional: Positive for fever and malaise/fatigue.   HENT: Negative for hearing loss, sinus pain and sore throat.    Eyes: Negative for pain and discharge.   Respiratory: Positive for cough, sputum production, shortness of breath and wheezing. Negative for hemoptysis.    Cardiovascular: Negative for chest pain, palpitations and leg swelling.   Gastrointestinal: Negative for abdominal pain, diarrhea, nausea and vomiting.   Genitourinary: Negative for dysuria, frequency and urgency.   Musculoskeletal: Positive for back pain.   Skin: Positive for itching and rash.   Neurological: Positive for weakness. Negative for speech change, seizures and headaches.   Psychiatric/Behavioral: Negative for depression and substance abuse.             Past Medical History:   Past Medical History:   Diagnosis Date   • Anxiety    • Arthritis     osteo   • Asthma    • Cancer (CMS-HCC) 2008    lung   • Cataract     removed bilat   • COPD (chronic obstructive pulmonary disease) (CMS-HCC)    • Dental disorder     upper denture   • Depression 7/27/2010   • Diabetes     oral meds   • Encounter for long-term (current) use of other medications    • Hyperlipidemia 7/27/2010   • Hypertension    • Muscle disorder     chronic muscle spasms   • PAD (peripheral artery disease) (CMS-HCC)    • Pain 12-10-15    back and legs, 6/10   • S/P CABG (coronary artery bypass graft) 7/27/2010   •  Scoliosis    • Thyroid disease    • Tobacco dependence    • Urinary bladder disorder    • Urinary incontinence        Past Surgical History:  Past Surgical History:   Procedure Laterality Date   • RECOVERY  1/15/2016    Procedure: IR1 VASCULAR CASE BERNABE ABDOMINAL AORTOGRAM WITH RUNOFF, POSSIBLE INTERVENTION;  Surgeon: Recoveryonly Surgery;  Location: SURGERY PRE-POST PROC UNIT [facility] [place];  Service:    • CAROTID ENDARTERECTOMY Left 12/16/2015    Procedure: CAROTID ENDARTERECTOMY;  Surgeon: Feliciano Bernabe M.D.;  Location: SURGERY [facility];  Service:    • OTHER ORTHOPEDIC SURGERY      repair left leg femur fx   • PB DEST,PARAVERTEBRAL,L/S,SINGLE  4/11/2011    Performed by LILIBETH FUNES at SURGERY SURGICAL [facility] ORS   • PB INJ DX/THER AGNT PARAVERT FACET JOINT, MANSOOR*  4/4/2011    Performed by LILIBETH FUNES at SURGERY SURGICAL [facility] ORS   • PB INJ DX/THER AGNT PARAVERT FACET JOINT, MANSOOR*  3/21/2011    Performed by LILIBETH FUNES at SURGERY SURGICAL [facility] ORS   • MULTIPLE CORONARY ARTERY BYPASS  2008   • LOBECTOMY  2008    left upper lobectomy for lung cancer   • APPENDECTOMY     • LAMINOTOMY     • VAGINAL HYSTERECTOMY TOTAL      1980's, dysplasia       Current Outpatient Medications:  Home Medications     Reviewed by Karan Richard (Pharmacy Tech) on 03/20/18 at 2240  Med List Status: Complete   Medication Last Dose Status   albuterol 108 (90 BASE) MCG/ACT Aero Soln inhalation aerosol 3/20/2018 Active   aripiprazole (ABILIFY) 5 MG tablet 3/19/2018 Active   aspirin 81 MG tablet 3/19/2018 Active   azithromycin (ZITHROMAX) 250 MG Tab 3/19/2018 Active   cilostazol (PLETAL) 100 MG Tab 3/19/2018 Active   Diclofenac Sodium (VOLTAREN) 1 % Gel 3/19/2018 Active   DULoxetine (CYMBALTA) 60 MG Cap DR Particles delayed-release capsule 3/19/2018 Active   fluticasone (FLONASE) 50 MCG/ACT nasal spray 3/19/2018 Active   furosemide (LASIX) 40 MG Tab > 4 DAYS Active   lamotrigine (LAMICTAL) 200 MG tablet 3/19/2018 Active    levothyroxine (SYNTHROID) 100 MCG Tab 3/19/2018 Active   lorazepam (ATIVAN) 0.5 MG Tab > 1 week Active   losartan-hydrochlorothiazide (HYZAAR) 50-12.5 MG per tablet 3/19/2018 Active   metformin (GLUCOPHAGE) 500 MG Tab 3/19/2018 Active   Oxycodone-Acetaminophen (PERCOCET-10)  MG Tab 3/20/2018 Active   Potassium Chloride CR (MICRO-K) 8 MEQ Cap CR capsule > 4 days Active   rosuvastatin (CRESTOR) 10 MG Tab 3/19/2018 Active   SPIRIVA HANDIHALER 18 MCG Cap 3/20/2018 Active                Medication Allergy/Sensitivities:  Allergies   Allergen Reactions   • Codeine Vomiting   • Latex Hives   • Sulfa Drugs Hives   • Tape      Paper ok         Family History:  Family History   Problem Relation Age of Onset   • Diabetes Father    • Diabetes Sister    • Diabetes Brother    • No Known Problems Mother    • Lung Disease Neg Hx    • Cancer Neg Hx    • Heart Disease Neg Hx    • Hypertension Neg Hx    • Hyperlipidemia Neg Hx    • Stroke Neg Hx    • Alcohol/Drug Neg Hx        Social History:  Social History     Social History   • Marital status:      Spouse name: N/A   • Number of children: N/A   • Years of education: N/A     Occupational History   • Not on file.     Social History Main Topics   • Smoking status: Current Every Day Smoker     Packs/day: 1.00     Years: 55.00     Types: Cigarettes     Start date: 1/1/1961   • Smokeless tobacco: Never Used      Comment: 1 PPD cigarettes daily   • Alcohol use No   • Drug use: No   • Sexual activity: No     Other Topics Concern   • Not on file     Social History Narrative   • No narrative on file     Living situation: [place]  PCP : KIMBER CaoRCARL       Physical Exam     Vitals:    03/21/18 0315 03/21/18 0405 03/21/18 0406 03/21/18 0416   BP:       Pulse: (!) 103   (!) 106   Resp: (!) 27 (!) 30 (!) 28 (!) 25   Temp:       SpO2: 96% 96% 96% 91%   Weight:       Height:         Body mass index is 25.34 kg/m².  BP (!) 69/50   Pulse (!) 106   Temp 37.4 °C (99.3 °F)    "Resp (!) 25   Ht 1.473 m (4' 10\")   Wt 55 kg (121 lb 4.1 oz)   SpO2 91%   BMI 25.34 kg/m²   O2 therapy: Pulse Oximetry: 91 %, O2 (LPM): 5, O2 Delivery: Oxymask    Physical Exam   Constitutional: She is oriented to person, place, and time. No distress.   HENT:   Head: Normocephalic and atraumatic.   Central line placed on RIJ   Eyes: Pupils are equal, round, and reactive to light. Right eye exhibits no discharge. Left eye exhibits no discharge. No scleral icterus.   Neck: No JVD present. No tracheal deviation present.   Cardiovascular: Regular rhythm and normal heart sounds.    Pulmonary/Chest: Effort normal. No stridor. She has wheezes. She has rales. She exhibits no tenderness.   Abdominal: She exhibits distension. There is no tenderness. There is no rebound and no guarding.   Musculoskeletal: She exhibits edema. She exhibits no tenderness.   Neurological: She is alert and oriented to person, place, and time.   Skin: Rash noted. She is not diaphoretic.   ecthymatous rash bilateral both lower extremities look like psoriasis status post steroid therapy with resolve scales      Data Review       Old Records Request:   Completed  Current Records review and summary: Completed    Lab Data Review:  Recent Results (from the past 24 hour(s))   Lactic acid (lactate)    Collection Time: 03/20/18  8:15 PM   Result Value Ref Range    Lactic Acid 1.7 0.5 - 2.0 mmol/L   CBC WITH DIFFERENTIAL    Collection Time: 03/20/18  8:15 PM   Result Value Ref Range    WBC 12.5 (H) 4.8 - 10.8 K/uL    RBC 4.30 4.20 - 5.40 M/uL    Hemoglobin 13.8 12.0 - 16.0 g/dL    Hematocrit 40.6 37.0 - 47.0 %    MCV 94.4 81.4 - 97.8 fL    MCH 32.1 27.0 - 33.0 pg    MCHC 34.0 33.6 - 35.0 g/dL    RDW 44.4 35.9 - 50.0 fL    Platelet Count 257 164 - 446 K/uL    MPV 10.0 9.0 - 12.9 fL    Neutrophils-Polys 81.90 (H) 44.00 - 72.00 %    Lymphocytes 10.40 (L) 22.00 - 41.00 %    Monocytes 6.80 0.00 - 13.40 %    Eosinophils 0.10 0.00 - 6.90 %    Basophils 0.30 0.00 " - 1.80 %    Immature Granulocytes 0.50 0.00 - 0.90 %    Nucleated RBC 0.00 /100 WBC    Neutrophils (Absolute) 10.28 (H) 2.00 - 7.15 K/uL    Lymphs (Absolute) 1.30 1.00 - 4.80 K/uL    Monos (Absolute) 0.85 0.00 - 0.85 K/uL    Eos (Absolute) 0.01 0.00 - 0.51 K/uL    Baso (Absolute) 0.04 0.00 - 0.12 K/uL    Immature Granulocytes (abs) 0.06 0.00 - 0.11 K/uL    NRBC (Absolute) 0.00 K/uL   COMP METABOLIC PANEL    Collection Time: 03/20/18  8:15 PM   Result Value Ref Range    Sodium 129 (L) 135 - 145 mmol/L    Potassium 4.1 3.6 - 5.5 mmol/L    Chloride 94 (L) 96 - 112 mmol/L    Co2 25 20 - 33 mmol/L    Anion Gap 10.0 0.0 - 11.9    Glucose 135 (H) 65 - 99 mg/dL    Bun 20 8 - 22 mg/dL    Creatinine 0.78 0.50 - 1.40 mg/dL    Calcium 9.2 8.5 - 10.5 mg/dL    AST(SGOT) 54 (H) 12 - 45 U/L    ALT(SGPT) 20 2 - 50 U/L    Alkaline Phosphatase 101 (H) 30 - 99 U/L    Total Bilirubin 0.4 0.1 - 1.5 mg/dL    Albumin 3.5 3.2 - 4.9 g/dL    Total Protein 6.7 6.0 - 8.2 g/dL    Globulin 3.2 1.9 - 3.5 g/dL    A-G Ratio 1.1 g/dL   ESTIMATED GFR    Collection Time: 03/20/18  8:15 PM   Result Value Ref Range    GFR If African American >60 >60 mL/min/1.73 m 2    GFR If Non African American >60 >60 mL/min/1.73 m 2   URINALYSIS    Collection Time: 03/20/18  9:58 PM   Result Value Ref Range    Color Yellow     Character Clear     Specific Gravity 1.013 <1.035    Ph 6.0 5.0 - 8.0    Glucose Negative Negative mg/dL    Ketones Negative Negative mg/dL    Protein 300 (A) Negative mg/dL    Bilirubin Negative Negative    Urobilinogen, Urine 1.0 Negative    Nitrite Negative Negative    Leukocyte Esterase Negative Negative    Occult Blood Small (A) Negative    Micro Urine Req Microscopic    URINE MICROSCOPIC (W/UA)    Collection Time: 03/20/18  9:58 PM   Result Value Ref Range    WBC 0-2 /hpf    RBC 10-20 (A) /hpf    Bacteria Negative None /hpf    Epithelial Cells Few /hpf    Hyaline Cast 0-2 /lpf   INFLUENZA A/B BY PCR    Collection Time: 03/20/18 10:40 PM    Result Value Ref Range    Influenza virus A RNA Negative Negative    Influenza virus B, PCR Negative Negative   Lactic acid (lactate)    Collection Time: 18 10:45 PM   Result Value Ref Range    Lactic Acid 2.2 (H) 0.5 - 2.0 mmol/L   TROPONIN    Collection Time: 18 10:45 PM   Result Value Ref Range    Troponin I 0.21 (H) 0.00 - 0.04 ng/mL   BTYPE NATRIURETIC PEPTIDE    Collection Time: 18 10:45 PM   Result Value Ref Range    B Natriuretic Peptide 312 (H) 0 - 100 pg/mL   PROCALCITONIN    Collection Time: 18 10:45 PM   Result Value Ref Range    Procalcitonin 0.18 <0.25 ng/mL   GAMMA GT (GGT)    Collection Time: 18 10:45 PM   Result Value Ref Range    Gamma Gt 70 (H) 7 - 34 U/L   EKG    Collection Time: 18 10:56 PM   Result Value Ref Range    Report       [facility] Emergency [facility]    Test Date:  2018  Pt Name:    TERRELL SANDOVAL                 Department: ER  MRN:        [id number]                      Room:       [facility]  Gender:     Female                       Technician: [id number]  :        1940                   Requested By:KRIS MARTINEZ  Order #:    [id number]                    Reading MD:    Measurements  Intervals                                Axis  Rate:       109                          P:          70  RI:         156                          QRS:        98  QRSD:       92                           T:          31  QT:         352  QTc:        475    Interpretive Statements  SINUS TACHYCARDIA  PROBABLE LEFT ATRIAL ABNORMALITY  RIGHT AXIS DEVIATION  Compared to ECG 2015 14:47:37  Sinus rhythm no longer present  T-wave abnormality no longer present         Imaging/Procedures Review:    ndependant Imaging Review: Completed  CT-CTA CHEST PULMONARY ARTERY W/ RECONS   Final Result         1. No CT evidence of pulmonary embolism.      2. Groundglass and airspace opacities in the left lingula and left lower lobe. Small areas of peripheral groundglass  opacities in the right lung as well. These are concerning for multifocal pneumonia. The largest area is in the left lower lobe.      3. Postsurgical change in the left upper lobe with nodular opacities. Comparison to prior study could be helpful for interval change.      ECHOCARDIOGRAM COMP W/O CONT    (Results Pending)        EKG:   EKG Independant Review: Completed  QTc: less than 500,  , Sinus tachycardia, no ST/T changes    (yes) Records reviewed and summarized in current documentation           Assessment/Plan     #  Sepsis/ septic shock/ CAP Pneumonia/ COPD exacerbation/ Acute hypoxemic respiratory failure  -  patietn presented with SOB, cough, green sputum  -  history of COPD, lung cancer status post lobectomy   -  Not on home O2 therapy  -  + smoking history active 1 pack/day , denies seizure, loss of consciousness,    -  left shift in WBC,   -  on CXR showed: bilateral lung increase interstitial markings bases,    -  BNP was 312  -  3/4 SIRS criteria: left shift, HR, RR,    - CURB 65: 3 positive for RR, BUN and age  - Hypoxic on room air  - CURB 65 score was 3    -  Received Unasyn and azithromycin in the ED, methyldpresdnisone 125mg iv, 2 L IV fluid  -  procalcitonine is normal 0.18 likely viral infection  -  mild increase of lactic acid after giving the IV fluid expected to decline later     Plan  -  Admitted to ICU  -  continue pulse oxymetry   -  RT per protocol/ currently 4 L mask  -  speech for swallowing evaluate   -  Continue Unasyn and azithromycin, switched to prednisone 40 mg   Instead of methylprednisolone 125 mg iv,  -  IVF base on the central line monitor MAP greater than 65  -  ipratropium-albuterol (DUONEB) nebulizer solution 3 mL 3 mL, Nebulization, RT-EVERY 4 HOURS   - albuterol inhaler 2 Puff 2 Puff, Inhalation, EVERY 4 HOURS PRN    - 2 x blood culture  - sputum culture   - urine culture   - CBC and BMP tomorrow  - echocardiogram pending  - concern for PE CT PE was ordered to rule  out PE with the poor mobility, tachycardia and hypoxia and elevated trops      # History of hypertension  - Hold home blood pressure meds    # Elevated Trop  - Negative for ischemic changes in the EKG, no chest pain   - Trop was 0.21  -  will  trend     # Hyponatremia   - No confusion   - On IV fluid   - CTM      # Elevated liver enzymes  - AST, ALP and  gamma GT  - Patient deneis any alcohol intake   - ctm     # Microscopic hemamturia  # Proteinuria     # Diabetes Type 2, non-insulin dependent    - held home oral hypoglycemic agents( metformin)  - manage with ISS/hypoglycemic protocol inpatient and monitor glucose level  - last Hb A1C 6.8    # Hypothyroidism   - continue home med        Anticipated Hospital stay:  >2 midnights  Quality Measures  Quality-Core Measures   Reviewed items::  EKG reviewed, Labs reviewed and Medications reviewed  Salcido catheter::  Critically Ill - Requiring Accurate Measurement of Urinary Output  DVT prophylaxis pharmacological::  Heparin  DVT prophylaxis - mechanical:  SCDs  Ulcer Prophylaxis::  Yes

## 2018-03-21 NOTE — ASSESSMENT & PLAN NOTE
Home meds include cymbalta BID, lamictal BID, abilify qpm,  xanax BID PRN  - Home meds resumed as above

## 2018-03-21 NOTE — ASSESSMENT & PLAN NOTE
- Negative for ischemic changes in the EKG, denied chest pain   - Trop was 0.21 on admission and trended down.  - Likely was due to demand ischemia from lung pathology

## 2018-03-21 NOTE — ED NOTES
Pt transported to [facility] with Paolo CHAIDEZ. Pt on monitor, on O2. All belongings accounted for. Vital signs consistent. Bedside report given.

## 2018-03-21 NOTE — ASSESSMENT & PLAN NOTE
CT 3/21 showed scattered groundglass opacities that were suggestive of pneumonia. She finished course IV unasyn on 3/24

## 2018-03-21 NOTE — ED TRIAGE NOTES
"Mia Hammond   BP (!) 69/50   Pulse 98   Temp 37.4 °C (99.3 °F)   Resp 18   Ht 1.473 m (4' 10\")   Wt 55 kg (121 lb 4.1 oz)   SpO2 94%   BMI 25.34 kg/m²     Chief Complaint   Patient presents with   • Shortness of Breath     Pt reports cough and SOB for past two weeks. Productive cough with green sputum. Increased SOB today, went to Urgent Care. Urgent care found O2 was 70% on RA. Pt RA is 82%. Pt placed on 4L NC. Pt used albuterol inhaler 4 times at home, recieved 1 neb treatment at urgent care.   • Cough     Productive cough. Green sputum.   • Sweats     Reports \"hot and cold sweats\"     Pt BIB [facility], sent by Urgent Care. Pt takes percocet 10/325 Q4. Last dose was at 1700 today. Afebrile at this time. Code Sepsis initiated.   "

## 2018-03-21 NOTE — ASSESSMENT & PLAN NOTE
Patient with  COPD secondary to tobacco use (not on home O2), cancer s/p lobectomy.   - Echo 3/21 showed pulm HTN  - No longer requiring oxygen  - IV Unasyn course completed on 3/24  - Patient received duonebs prn, albuterol prn, and RT protocol, and supplemental O2. IV steroids were changed to PO steroids and a taper was initiated  Plan:  - On discharge, we recommend completing the prednisone taper as follows starting on 3/30: 30 mg x 2 days, 20 mg x 3 days, 10 mg x 3 days, 5 mg x 2 days  - We recommend supplemental O2 as needed (1-2 L with ambulation currently)  - We recommend symbicort and spiriva inhalers on discharge

## 2018-03-21 NOTE — ASSESSMENT & PLAN NOTE
- Last Hb A1C 6.8 3/2018. Home metformin resumed 3/27 with ISS/hypoglycemic protocol inpatient and monitor glucose level  - We recommend continuing metformin on discharge

## 2018-03-21 NOTE — PROGRESS NOTES
"Subjective:      Mia Hammond is a 77 y.o. female who presents with Shortness of Breath            Pt c/o shortness of breath, productive cough. Pt has hx of copd, and cardiac.  Pt has a great grandson in the hospital with flu and rsv      Shortness of Breath   This is a new problem. The current episode started in the past 7 days. The problem has been gradually worsening. Associated symptoms include coryza, rhinorrhea, sputum production and wheezing. Pertinent negatives include no ear pain, fever, sore throat or vomiting. She has tried beta agonist inhalers for the symptoms. The treatment provided no relief. Her past medical history is significant for CAD and COPD.       Review of Systems   Constitutional: Positive for malaise/fatigue. Negative for chills and fever.   HENT: Positive for congestion and rhinorrhea. Negative for ear discharge, ear pain and sore throat.    Respiratory: Positive for cough, sputum production, shortness of breath and wheezing.    Gastrointestinal: Negative for diarrhea, nausea and vomiting.   Musculoskeletal: Negative for myalgias.   Neurological: Positive for weakness.          Objective:     /52   Pulse 94   Temp 36.8 °C (98.2 °F)   Ht 1.448 m (4' 9\")   Wt 47.6 kg (105 lb)   SpO2 91% Comment: on 2 lt   BMI 22.72 kg/m²      Physical Exam   Constitutional: She appears well-developed and well-nourished. No distress.   HENT:   Head: Normocephalic and atraumatic.   Right Ear: Tympanic membrane and ear canal normal.   Left Ear: Tympanic membrane and ear canal normal.   Nose: Rhinorrhea present.   Mouth/Throat: Uvula is midline and mucous membranes are normal. No trismus in the jaw. No uvula swelling. Posterior oropharyngeal edema and posterior oropharyngeal erythema present. No oropharyngeal exudate.   Eyes: Conjunctivae are normal. Pupils are equal, round, and reactive to light.   Neck: Neck supple.   Cardiovascular: Normal rate, regular rhythm and normal heart sounds.  "   Pulmonary/Chest: Accessory muscle usage present. Tachypnea noted. No respiratory distress. She has decreased breath sounds. She has wheezes. She has rales.   Lymphadenopathy:     She has cervical adenopathy.   Neurological: She is alert.   Skin: Skin is warm and dry. Capillary refill takes less than 2 seconds.   Psychiatric: She has a normal mood and affect. Her behavior is normal.   Vitals reviewed.              Assessment/Plan:       1. COPD with acute exacerbation (CMS-[facility])  ipratropium-albuterol (DUONEB) nebulizer solution 3 mL    DX-CHEST-2 VIEWS       Pt with O2 sat 71% on RA she was placed on O2  2 l nc with  O2 2 l NC    Xray of chest -  Reviewed film and radiology interpretation:   Postsurgical changes in the left hemithorax. Ill-defined density at the left lung apex may be related to scarring. Additional process is not excluded. Follow-up is recommended. Further evaluation can be performed with CT chest.  Cardiomegaly.  Atherosclerotic plaque.  Mild interstitial prominence is likely chronic    In Clinic Medication : duoneb  I personally administered the ordered medication/vaccine with assistance of the MA  .  Pt take off of O2 for 15 mins O2 sat 77% on room air    D/w pt/family recommendation to transfer to ED for evaluation and treatment not available at this facility, they verbalize agreement and request [facility]  D/w Dr Buckley  who accepted patient   Recommended transport by ambulance pt/family agreed. Report [facility] paramedics. Pt condition unchanged.

## 2018-03-21 NOTE — ASSESSMENT & PLAN NOTE
- S/p right upper lobectomy in 2008 during same surgery CABG was performed. Performed at [facility] in [facility].  - Not followed by oncology  - CT chest 3/21 showed scattered opacities

## 2018-03-21 NOTE — PROCEDURES
Date: 3/21/2018   Time: 3:30 am  Indication: Hemodynamic unstable patient with septic shock   Resident: Dr. Jayda Villalta  Attending: Dr. Gonda  A time-out was completed verifying correct patient, procedure, site, positioning, and special equipment if applicable. The patient was placed in a dependent position appropriate for central line placement based on the vein to be cannulated. The patient’s right neck was prepped and draped in sterile fashion. 1% Lidocaine was used to anesthetize the surrounding skin area. A triple lumen 9-B[place]ud[place] Cordis catheter was introduced into the the internal jugular using the Seldinger technique and under ultrasound guidance. The catheter was threaded smoothly over the guide wire and appropriate blood return was obtained. Each lumen of the catheter was evacuated of air and flushed with sterile saline. The catheter was then sutured in place to the skin and a sterile dressing applied. Perfusion to the extremity distal to the point of catheter insertion was checked and found to be adequate. Attending was present for the entire procedure.  Estimated Blood Loss: 5 ml  The patient tolerated the procedure well and there were no complications.

## 2018-03-21 NOTE — ASSESSMENT & PLAN NOTE
- held home oral hypoglycemic agents( metformin)  - manage with ISS/hypoglycemic protocol inpatient and monitor glucose level  - last Hb A1C 6.8

## 2018-03-21 NOTE — ASSESSMENT & PLAN NOTE
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

## 2018-03-22 ENCOUNTER — APPOINTMENT (OUTPATIENT)
Dept: RADIOLOGY | Facility: MEDICAL CENTER | Age: 78
DRG: 871 | End: 2018-03-22
Attending: INTERNAL MEDICINE
Payer: MEDICARE

## 2018-03-22 LAB
ALBUMIN SERPL BCP-MCNC: 3.3 G/DL (ref 3.2–4.9)
ALBUMIN/GLOB SERPL: 1.1 G/DL
ALP SERPL-CCNC: 109 U/L (ref 30–99)
ALT SERPL-CCNC: 20 U/L (ref 2–50)
ANION GAP SERPL CALC-SCNC: 8 MMOL/L (ref 0–11.9)
AST SERPL-CCNC: 39 U/L (ref 12–45)
BACTERIA UR CULT: NORMAL
BASOPHILS # BLD AUTO: 0 % (ref 0–1.8)
BASOPHILS # BLD: 0 K/UL (ref 0–0.12)
BILIRUB SERPL-MCNC: 0.3 MG/DL (ref 0.1–1.5)
BUN SERPL-MCNC: 12 MG/DL (ref 8–22)
CALCIUM SERPL-MCNC: 8.1 MG/DL (ref 8.5–10.5)
CHLORIDE SERPL-SCNC: 105 MMOL/L (ref 96–112)
CO2 SERPL-SCNC: 25 MMOL/L (ref 20–33)
CREAT SERPL-MCNC: 0.66 MG/DL (ref 0.5–1.4)
EOSINOPHIL # BLD AUTO: 0 K/UL (ref 0–0.51)
EOSINOPHIL NFR BLD: 0 % (ref 0–6.9)
ERYTHROCYTE [DISTWIDTH] IN BLOOD BY AUTOMATED COUNT: 44.8 FL (ref 35.9–50)
GLOBULIN SER CALC-MCNC: 3 G/DL (ref 1.9–3.5)
GLUCOSE BLD-MCNC: 138 MG/DL (ref 65–99)
GLUCOSE BLD-MCNC: 173 MG/DL (ref 65–99)
GLUCOSE SERPL-MCNC: 177 MG/DL (ref 65–99)
HCT VFR BLD AUTO: 36.5 % (ref 37–47)
HGB BLD-MCNC: 12.1 G/DL (ref 12–16)
LYMPHOCYTES # BLD AUTO: 0.72 K/UL (ref 1–4.8)
LYMPHOCYTES NFR BLD: 5.2 % (ref 22–41)
MAGNESIUM SERPL-MCNC: 1.6 MG/DL (ref 1.5–2.5)
MANUAL DIFF BLD: NORMAL
MCH RBC QN AUTO: 31.5 PG (ref 27–33)
MCHC RBC AUTO-ENTMCNC: 33.2 G/DL (ref 33.6–35)
MCV RBC AUTO: 95.1 FL (ref 81.4–97.8)
MONOCYTES # BLD AUTO: 0.61 K/UL (ref 0–0.85)
MONOCYTES NFR BLD AUTO: 4.4 % (ref 0–13.4)
MORPHOLOGY BLD-IMP: NORMAL
NEUTROPHILS # BLD AUTO: 12.48 K/UL (ref 2–7.15)
NEUTROPHILS NFR BLD: 90.4 % (ref 44–72)
NRBC # BLD AUTO: 0 K/UL
NRBC BLD-RTO: 0 /100 WBC
PHOSPHATE SERPL-MCNC: 2.6 MG/DL (ref 2.5–4.5)
PLATELET # BLD AUTO: 275 K/UL (ref 164–446)
PLATELET BLD QL SMEAR: NORMAL
PMV BLD AUTO: 10.4 FL (ref 9–12.9)
POTASSIUM SERPL-SCNC: 3.4 MMOL/L (ref 3.6–5.5)
PROT SERPL-MCNC: 6.3 G/DL (ref 6–8.2)
RBC # BLD AUTO: 3.84 M/UL (ref 4.2–5.4)
RBC BLD AUTO: NORMAL
SIGNIFICANT IND 70042: NORMAL
SITE SITE: NORMAL
SODIUM SERPL-SCNC: 138 MMOL/L (ref 135–145)
SOURCE SOURCE: NORMAL
TROPONIN I SERPL-MCNC: 0.1 NG/ML (ref 0–0.04)
WBC # BLD AUTO: 13.8 K/UL (ref 4.8–10.8)

## 2018-03-22 PROCEDURE — 700101 HCHG RX REV CODE 250: Performed by: STUDENT IN AN ORGANIZED HEALTH CARE EDUCATION/TRAINING PROGRAM

## 2018-03-22 PROCEDURE — 83735 ASSAY OF MAGNESIUM: CPT

## 2018-03-22 PROCEDURE — 700102 HCHG RX REV CODE 250 W/ 637 OVERRIDE(OP): Performed by: INTERNAL MEDICINE

## 2018-03-22 PROCEDURE — 700102 HCHG RX REV CODE 250 W/ 637 OVERRIDE(OP): Performed by: STUDENT IN AN ORGANIZED HEALTH CARE EDUCATION/TRAINING PROGRAM

## 2018-03-22 PROCEDURE — 82962 GLUCOSE BLOOD TEST: CPT | Mod: 91

## 2018-03-22 PROCEDURE — 85027 COMPLETE CBC AUTOMATED: CPT

## 2018-03-22 PROCEDURE — G8997 SWALLOW GOAL STATUS: HCPCS | Mod: CH

## 2018-03-22 PROCEDURE — A9270 NON-COVERED ITEM OR SERVICE: HCPCS | Performed by: STUDENT IN AN ORGANIZED HEALTH CARE EDUCATION/TRAINING PROGRAM

## 2018-03-22 PROCEDURE — 92526 ORAL FUNCTION THERAPY: CPT

## 2018-03-22 PROCEDURE — A9270 NON-COVERED ITEM OR SERVICE: HCPCS | Performed by: INTERNAL MEDICINE

## 2018-03-22 PROCEDURE — 80053 COMPREHEN METABOLIC PANEL: CPT

## 2018-03-22 PROCEDURE — G8996 SWALLOW CURRENT STATUS: HCPCS | Mod: CJ

## 2018-03-22 PROCEDURE — 92612 ENDOSCOPY SWALLOW (FEES) VID: CPT

## 2018-03-22 PROCEDURE — 770022 HCHG ROOM/CARE - ICU (200)

## 2018-03-22 PROCEDURE — 71045 X-RAY EXAM CHEST 1 VIEW: CPT

## 2018-03-22 PROCEDURE — 700111 HCHG RX REV CODE 636 W/ 250 OVERRIDE (IP): Performed by: STUDENT IN AN ORGANIZED HEALTH CARE EDUCATION/TRAINING PROGRAM

## 2018-03-22 PROCEDURE — 700111 HCHG RX REV CODE 636 W/ 250 OVERRIDE (IP): Performed by: INTERNAL MEDICINE

## 2018-03-22 PROCEDURE — 84100 ASSAY OF PHOSPHORUS: CPT

## 2018-03-22 PROCEDURE — 84484 ASSAY OF TROPONIN QUANT: CPT

## 2018-03-22 PROCEDURE — 700105 HCHG RX REV CODE 258: Performed by: INTERNAL MEDICINE

## 2018-03-22 PROCEDURE — 94640 AIRWAY INHALATION TREATMENT: CPT

## 2018-03-22 PROCEDURE — 85007 BL SMEAR W/DIFF WBC COUNT: CPT

## 2018-03-22 PROCEDURE — 700105 HCHG RX REV CODE 258: Performed by: STUDENT IN AN ORGANIZED HEALTH CARE EDUCATION/TRAINING PROGRAM

## 2018-03-22 RX ORDER — GLYCOPYRROLATE 1 MG/1
1 TABLET ORAL 3 TIMES DAILY PRN
Status: DISCONTINUED | OUTPATIENT
Start: 2018-03-22 | End: 2018-03-29 | Stop reason: HOSPADM

## 2018-03-22 RX ORDER — MORPHINE SULFATE 4 MG/ML
1 INJECTION, SOLUTION INTRAMUSCULAR; INTRAVENOUS EVERY 4 HOURS PRN
Status: DISCONTINUED | OUTPATIENT
Start: 2018-03-22 | End: 2018-03-22

## 2018-03-22 RX ORDER — MORPHINE SULFATE 10 MG/ML
10 INJECTION, SOLUTION INTRAMUSCULAR; INTRAVENOUS
Status: DISCONTINUED | OUTPATIENT
Start: 2018-03-22 | End: 2018-03-22

## 2018-03-22 RX ORDER — LORAZEPAM 2 MG/ML
1 INJECTION INTRAMUSCULAR
Status: DISCONTINUED | OUTPATIENT
Start: 2018-03-22 | End: 2018-03-23

## 2018-03-22 RX ORDER — IPRATROPIUM BROMIDE AND ALBUTEROL SULFATE 2.5; .5 MG/3ML; MG/3ML
3 SOLUTION RESPIRATORY (INHALATION)
Status: DISCONTINUED | OUTPATIENT
Start: 2018-03-22 | End: 2018-03-22

## 2018-03-22 RX ORDER — MORPHINE SULFATE 10 MG/ML
5 INJECTION, SOLUTION INTRAMUSCULAR; INTRAVENOUS
Status: DISCONTINUED | OUTPATIENT
Start: 2018-03-22 | End: 2018-03-22

## 2018-03-22 RX ORDER — ATROPINE SULFATE 10 MG/ML
2 SOLUTION/ DROPS OPHTHALMIC EVERY 4 HOURS PRN
Status: DISCONTINUED | OUTPATIENT
Start: 2018-03-22 | End: 2018-03-22

## 2018-03-22 RX ORDER — MORPHINE SULFATE 100 MG/5ML
10 SOLUTION ORAL
Status: DISCONTINUED | OUTPATIENT
Start: 2018-03-22 | End: 2018-03-23

## 2018-03-22 RX ORDER — FUROSEMIDE 10 MG/ML
20 INJECTION INTRAMUSCULAR; INTRAVENOUS
Status: DISCONTINUED | OUTPATIENT
Start: 2018-03-22 | End: 2018-03-22

## 2018-03-22 RX ORDER — HYDRALAZINE HYDROCHLORIDE 20 MG/ML
20 INJECTION INTRAMUSCULAR; INTRAVENOUS EVERY 4 HOURS PRN
Status: DISCONTINUED | OUTPATIENT
Start: 2018-03-22 | End: 2018-03-22

## 2018-03-22 RX ORDER — SCOLOPAMINE TRANSDERMAL SYSTEM 1 MG/1
1 PATCH, EXTENDED RELEASE TRANSDERMAL
Status: DISCONTINUED | OUTPATIENT
Start: 2018-03-22 | End: 2018-03-23

## 2018-03-22 RX ORDER — LORAZEPAM 2 MG/ML
1 CONCENTRATE ORAL
Status: DISCONTINUED | OUTPATIENT
Start: 2018-03-22 | End: 2018-03-23

## 2018-03-22 RX ORDER — LORAZEPAM 2 MG/ML
INJECTION INTRAMUSCULAR
Status: ACTIVE
Start: 2018-03-22 | End: 2018-03-22

## 2018-03-22 RX ORDER — ATROPINE SULFATE 10 MG/ML
2 SOLUTION/ DROPS OPHTHALMIC EVERY 4 HOURS PRN
Status: DISCONTINUED | OUTPATIENT
Start: 2018-03-22 | End: 2018-03-24

## 2018-03-22 RX ORDER — MORPHINE SULFATE 10 MG/ML
5 INJECTION, SOLUTION INTRAMUSCULAR; INTRAVENOUS
Status: DISCONTINUED | OUTPATIENT
Start: 2018-03-22 | End: 2018-03-23

## 2018-03-22 RX ORDER — METHYLPREDNISOLONE SODIUM SUCCINATE 40 MG/ML
40 INJECTION, POWDER, LYOPHILIZED, FOR SOLUTION INTRAMUSCULAR; INTRAVENOUS ONCE
Status: COMPLETED | OUTPATIENT
Start: 2018-03-22 | End: 2018-03-22

## 2018-03-22 RX ORDER — MORPHINE SULFATE 100 MG/5ML
10 SOLUTION ORAL
Status: DISCONTINUED | OUTPATIENT
Start: 2018-03-22 | End: 2018-03-22

## 2018-03-22 RX ORDER — METHYLPREDNISOLONE SODIUM SUCCINATE 40 MG/ML
40 INJECTION, POWDER, LYOPHILIZED, FOR SOLUTION INTRAMUSCULAR; INTRAVENOUS ONCE
Status: DISCONTINUED | OUTPATIENT
Start: 2018-03-22 | End: 2018-03-22

## 2018-03-22 RX ORDER — LORAZEPAM 2 MG/ML
0.5 INJECTION INTRAMUSCULAR EVERY 4 HOURS PRN
Status: DISCONTINUED | OUTPATIENT
Start: 2018-03-22 | End: 2018-03-22

## 2018-03-22 RX ORDER — FUROSEMIDE 10 MG/ML
20 INJECTION INTRAMUSCULAR; INTRAVENOUS ONCE
Status: COMPLETED | OUTPATIENT
Start: 2018-03-22 | End: 2018-03-22

## 2018-03-22 RX ORDER — MORPHINE SULFATE 10 MG/ML
10 INJECTION, SOLUTION INTRAMUSCULAR; INTRAVENOUS
Status: DISCONTINUED | OUTPATIENT
Start: 2018-03-22 | End: 2018-03-23

## 2018-03-22 RX ORDER — MORPHINE SULFATE 4 MG/ML
2 INJECTION, SOLUTION INTRAMUSCULAR; INTRAVENOUS ONCE
Status: COMPLETED | OUTPATIENT
Start: 2018-03-22 | End: 2018-03-22

## 2018-03-22 RX ADMIN — IPRATROPIUM BROMIDE AND ALBUTEROL SULFATE 3 ML: .5; 3 SOLUTION RESPIRATORY (INHALATION) at 09:58

## 2018-03-22 RX ADMIN — IPRATROPIUM BROMIDE AND ALBUTEROL SULFATE 3 ML: .5; 3 SOLUTION RESPIRATORY (INHALATION) at 06:14

## 2018-03-22 RX ADMIN — AMPICILLIN SODIUM AND SULBACTAM SODIUM 3 G: 2; 1 INJECTION, POWDER, FOR SOLUTION INTRAMUSCULAR; INTRAVENOUS at 15:15

## 2018-03-22 RX ADMIN — AZITHROMYCIN 250 MG: 250 TABLET, FILM COATED ORAL at 09:45

## 2018-03-22 RX ADMIN — MORPHINE SULFATE 2 MG: 4 INJECTION INTRAVENOUS at 17:16

## 2018-03-22 RX ADMIN — MORPHINE SULFATE 5 MG: 10 INJECTION INTRAVENOUS at 18:40

## 2018-03-22 RX ADMIN — MORPHINE SULFATE 1 MG/HR: 50 INJECTION, SOLUTION, CONCENTRATE INTRAVENOUS at 18:31

## 2018-03-22 RX ADMIN — HEPARIN SODIUM 5000 UNITS: 5000 INJECTION, SOLUTION INTRAVENOUS; SUBCUTANEOUS at 15:15

## 2018-03-22 RX ADMIN — FUROSEMIDE 20 MG: 10 INJECTION, SOLUTION INTRAMUSCULAR; INTRAVENOUS at 08:26

## 2018-03-22 RX ADMIN — LEVOTHYROXINE SODIUM 88 MCG: 88 TABLET ORAL at 05:55

## 2018-03-22 RX ADMIN — HYDRALAZINE HYDROCHLORIDE 20 MG: 20 INJECTION INTRAMUSCULAR; INTRAVENOUS at 17:03

## 2018-03-22 RX ADMIN — AMPICILLIN SODIUM AND SULBACTAM SODIUM 3 G: 2; 1 INJECTION, POWDER, FOR SOLUTION INTRAMUSCULAR; INTRAVENOUS at 04:01

## 2018-03-22 RX ADMIN — ASPIRIN 81 MG: 81 TABLET, CHEWABLE ORAL at 09:45

## 2018-03-22 RX ADMIN — BUDESONIDE 0.5 MG: 0.5 SUSPENSION RESPIRATORY (INHALATION) at 06:19

## 2018-03-22 RX ADMIN — LORAZEPAM 0.5 MG: 2 INJECTION INTRAMUSCULAR; INTRAVENOUS at 15:15

## 2018-03-22 RX ADMIN — IPRATROPIUM BROMIDE AND ALBUTEROL SULFATE 3 ML: .5; 3 SOLUTION RESPIRATORY (INHALATION) at 14:32

## 2018-03-22 RX ADMIN — INSULIN HUMAN 2 UNITS: 100 INJECTION, SOLUTION PARENTERAL at 06:01

## 2018-03-22 RX ADMIN — AMPICILLIN SODIUM AND SULBACTAM SODIUM 3 G: 2; 1 INJECTION, POWDER, FOR SOLUTION INTRAMUSCULAR; INTRAVENOUS at 08:08

## 2018-03-22 RX ADMIN — FLUTICASONE PROPIONATE 50 MCG: 50 SPRAY, METERED NASAL at 10:00

## 2018-03-22 RX ADMIN — LORAZEPAM 0.5 MG: 2 INJECTION INTRAMUSCULAR; INTRAVENOUS at 06:41

## 2018-03-22 RX ADMIN — MORPHINE SULFATE 1 MG: 4 INJECTION INTRAVENOUS at 17:03

## 2018-03-22 RX ADMIN — MORPHINE SULFATE 1 MG: 4 INJECTION INTRAVENOUS at 13:47

## 2018-03-22 RX ADMIN — LORAZEPAM 1 MG: 2 INJECTION INTRAMUSCULAR; INTRAVENOUS at 18:25

## 2018-03-22 RX ADMIN — METHYLPREDNISOLONE SODIUM SUCCINATE 40 MG: 40 INJECTION, POWDER, FOR SOLUTION INTRAMUSCULAR; INTRAVENOUS at 17:14

## 2018-03-22 RX ADMIN — HEPARIN SODIUM 5000 UNITS: 5000 INJECTION, SOLUTION INTRAVENOUS; SUBCUTANEOUS at 05:55

## 2018-03-22 RX ADMIN — METHYLPREDNISOLONE SODIUM SUCCINATE 40 MG: 40 INJECTION, POWDER, FOR SOLUTION INTRAMUSCULAR; INTRAVENOUS at 09:45

## 2018-03-22 RX ADMIN — FUROSEMIDE 20 MG: 10 INJECTION, SOLUTION INTRAMUSCULAR; INTRAVENOUS at 17:00

## 2018-03-22 RX ADMIN — TIOTROPIUM BROMIDE 1 CAPSULE: 18 CAPSULE ORAL; RESPIRATORY (INHALATION) at 09:59

## 2018-03-22 ASSESSMENT — PAIN SCALES - GENERAL
PAINLEVEL_OUTOF10: 0
PAINLEVEL_OUTOF10: 8
PAINLEVEL_OUTOF10: 0
PAINLEVEL_OUTOF10: 2

## 2018-03-22 ASSESSMENT — ENCOUNTER SYMPTOMS
WHEEZING: 1
CHILLS: 1
FALLS: 0
SHORTNESS OF BREATH: 1
COUGH: 1
BACK PAIN: 1
FEVER: 1
PALPITATIONS: 0
SPEECH CHANGE: 0
WEIGHT LOSS: 1
FOCAL WEAKNESS: 0
HEMOPTYSIS: 0
SPUTUM PRODUCTION: 1
NAUSEA: 0
HEADACHES: 0
VOMITING: 0
LOSS OF CONSCIOUSNESS: 0

## 2018-03-22 NOTE — PROGRESS NOTES
RN noted Nursing Communication for [facility] Accu-checks. No order found in MAR. RN spoke with Alex from pharmacy regarding lack of order, was advised to speak to [facility] resident. RN spoke with Dr. Villalta regarding need for [facility] order and parameters for sliding scale. New orders received.

## 2018-03-22 NOTE — CARE PLAN
Problem: Venous Thromboembolism (VTW)/Deep Vein Thrombosis (DVT) Prevention:  Goal: Patient will participate in Venous Thrombosis (VTE)/Deep Vein Thrombosis (DVT)Prevention Measures  Outcome: PROGRESSING AS EXPECTED    Intervention: Ensure patient wears graduated elastic stockings (KELLIE hose) and/or SCDs, if ordered, when in bed or chair (Remove at least once per shift for skin check)  Patient has SCDs in place. Patient has Heparin SQ for VTE prophyalxis.      Problem: Fluid Volume:  Goal: Will maintain balanced intake and output  Outcome: PROGRESSING AS EXPECTED  Patient drinking plenty of PO liquids. Patient has NS IVFs at 75/hr. Patient with adequate UOP. SBP stable.

## 2018-03-22 NOTE — PROGRESS NOTES
[facility] ICU Progress Note      Admit Date: 3/20/2018  ICU Day: 3    Resident(s): Suzie Tello  Attending: JULIEN FELDMAN/ Dr. Claire    Date & Time:   3/22/2018   1:30 PM       Patient ID:    Name:             Mia Hammond   YOB: 1940  Age:                 77 y.o.  female   MRN:               [id number]    ID:  77 year old female with a PMH of current tobacco use, COPD not on home 02 lung cancer s/p lobectomy, coronary artery disease s/p CABG presented to the ED on 3/20/2018 with complaints of SOB, cough productive of green sputum. Patient was seen at urgent care 3 days prior to admit and placed on azithromycin which did not relieve the patient's symptoms. Her symptoms continued to worsen and returned to urgent care. She was found to be hypoxic on room air Sp02 in 70's and was sent to the ED here at [facility]. Patient was found to be hypotensive in the ED and after 1.6L IV fluid bolus per sepsis protocol her blood pressure remained low. At that time a central line in right IJ was placed for pressor therapy which she only required briefly in the ICU. Oxygen requirement's increased from 4 L nasal cannula to 4 L oxymask and lung auscultation revealed diffuse scattered wheezing. She was initiated on IV unasyn and azithromycin and started on IV steroids. Patient was placed in ICU for pressors and close observation.    Consultants:  PMA: Dr. Claire    Interval Events:  - Had an episode of anxiety this morning, relieved with 1 dose of ativan.  - AOx4, breathing has been better, however has cough and becomes short of breath on minimal ambulation.  - Failed swallow eval this morning, needs to be NPO pending FEES evaluation  - Received 1 dose of 20 mg of lasix this am  - continue antibiotics and steroids for now, has morphine to help with relative air hunger.    Review of Systems   Constitutional: Positive for chills, fever, malaise/fatigue and weight loss.   Respiratory: Positive for cough, sputum production,  "shortness of breath and wheezing. Negative for hemoptysis.    Cardiovascular: Negative for chest pain, palpitations and leg swelling.   Gastrointestinal: Negative for nausea and vomiting.   Musculoskeletal: Positive for back pain. Negative for falls.   Neurological: Negative for speech change, focal weakness, loss of consciousness and headaches.       PHYSICAL EXAM  Vitals:    03/22/18 0645 03/22/18 0800 03/22/18 0900 03/22/18 1000   BP:       Pulse: (!) 142 (!) 109 (!) 113 (!) 118   Resp: (!) 28 19  (!) 31   Temp:       SpO2: 94%   100%   Weight:       Height:         Body mass index is 21.86 kg/m².  BP (!) 69/50   Pulse (!) 118   Temp 37.1 °C (98.8 °F)   Resp (!) 31   Ht 1.499 m (4' 11\")   Wt 49.1 kg (108 lb 3.9 oz)   SpO2 100%   Breastfeeding? No   BMI 21.86 kg/m²    O2 therapy: Pulse Oximetry: 100 %, O2 (LPM): 10, O2 Delivery: Oxymask    Physical Exam  Constitutional: She is oriented to person, place, and time. No distress.   Head: Normocephalic and atraumatic.   Central line placed on RIJ   Eyes: Pupils are equal, round, and reactive to light. Right eye exhibits no discharge. Left eye exhibits no discharge. No scleral icterus.   Neck: No JVD present. No tracheal deviation present.   Cardiovascular: Regular rhythm and normal heart sounds. Murmur noted.  Pulmonary/Chest: Diffusely decreased breath sounds. Effort normal. No stridor. She has rales. She exhibits no tenderness. Mild clubbing.  Abdominal: There is no tenderness. There is no rebound and no guarding.   Musculoskeletal: No edema. She exhibits no tenderness.   Neurological: She is alert and oriented to person, place, and time.   Skin: Rash noted. She is not diaphoretic.       Respiratory:     Respiration: (!) 31, Pulse Oximetry: 100 %, O2 Daily Delivery Respiratory : OxyMask    Chest Tube Drains:          Invalid input(s): [id number]    HemoDynamics:  Pulse: (!) 118, Heart Rate (Monitored): (!) 118 NIBP: (!) 194/103        Fluids:    Date " 18 0700 - 18 0659   Shift 4431-7124 6973-1075 3255-3970 24 Hour Total   I  N  T  A  K  E   Shift Total       O  U  T  P  U  T   Urine          Number of Times Incontinent of Urine 3 x   3 x    Stool          Number of Times Stooled 0 x   0 x    Shift Total       NET            Intake/Output Summary (Last 24 hours) at 18 1446  Last data filed at 18 1200   Gross per 24 hour   Intake              480 ml   Output              900 ml   Net             -420 ml          Body mass index is 21.86 kg/m².    Recent Labs      18   0445  18   0610   SODIUM  129*  135  138   POTASSIUM  4.1  3.4*  3.4*   CHLORIDE  94*  101  105   CO2  25  25  25   BUN  20  11  12   CREATININE  0.78  0.59  0.66   MAGNESIUM   --    --   1.6   PHOSPHORUS   --    --   2.6   CALCIUM  9.2  8.0*  8.1*       GI/Nutrition:  Recent Labs      185  18   0610   ALTSGPT  20   --   16  20   ASTSGOT  54*   --   41  39   ALKPHOSPHAT  101*   --   88  109*   TBILIRUBIN  0.4   --   0.3  0.3   GAMMAGT   --   70*   --    --    GLUCOSE  135*   --   197*  177*       Heme:  Recent Labs      185  18   0610   RBC  4.30  3.62*  3.84*   HEMOGLOBIN  13.8  11.6*  12.1   HEMATOCRIT  40.6  34.2*  36.5*   PLATELETCT  257  240  275   PROTHROMBTM   --   13.1   --    APTT   --   30.1   --    INR   --   1.02   --        Infectious Disease:  Temp  Av.9 °C (98.4 °F)  Min: 36.7 °C (98 °F)  Max: 37.1 °C (98.8 °F)  Recent Labs      185  18   0610   WBC  12.5*  10.4  13.8*   NEUTSPOLYS  81.90*  94.50*  90.40*   LYMPHOCYTES  10.40*  3.80*  5.20*   MONOCYTES  6.80  1.10  4.40   EOSINOPHILS  0.10  0.00  0.00   BASOPHILS  0.30  0.10  0.00   ASTSGOT  54*  41  39   ALTSGPT  20  16  20   ALKPHOSPHAT  101*  88  109*   TBILIRUBIN  0.4  0.3  0.3       Meds:  • LORazepam  0.5 mg     • LORazepam       • methylPREDNISolone  40  mg     • morphine injection  1 mg     • ampicillin-sulbactam (UNASYN) IV  3 g     • heparin  5,000 Units     • albuterol  2 Puff     • lamotrigine  200 mg     • levothyroxine  88 mcg     • glucose 4 g  16 g      And   • dextrose 50%  25 mL     • tiotropium  1 Cap     • omeprazole  20 mg     • azithromycin  250 mg     • insulin regular  2-9 Units      And   • glucose 4 g  16 g      And   • dextrose 50%  25 mL     • budesonide  0.5 mg     • senna-docusate  2 Tab      And   • polyethylene glycol/lytes  1 Packet      And   • magnesium hydroxide  30 mL      And   • bisacodyl  10 mg     • Respiratory Care per Protocol       • guaiFENesin dextromethorphan  10 mL     • acetaminophen  650 mg     • NS   50 mL/hr at 03/22/18 0914   • ipratropium-albuterol  3 mL     • aspirin  81 mg     • DULoxetine  60 mg     • fluticasone  1 Spray     • rosuvastatin  10 mg     • oxyCODONE-acetaminophen  1 Tab          Procedures:  Right IJ central catheter 3/21    Imaging:  DX-CHEST-PORTABLE (1 VIEW)   Final Result      Moderate diffuse interstitial opacities increased when compared to previous and consistent with edema or pneumonitis. No pneumothorax.   Ill-defined opacities left lung apex appears similar to recent findings. Continue follow-up.      ECHOCARDIOGRAM COMP W/O CONT   Final Result      CT-CTA CHEST PULMONARY ARTERY W/ RECONS   Final Result   Addendum 1 of 1   Right central venous catheter with tip in the mid SVC.      Final         1. No CT evidence of pulmonary embolism.      2. Groundglass and airspace opacities in the left lingula and left lower lobe. Small areas of peripheral groundglass opacities in the right lung as well. These are concerning for multifocal pneumonia. The largest area is in the left lower lobe.      3. Postsurgical change in the left upper lobe with nodular opacities. Comparison to prior study could be helpful for interval change.          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, lung cancer s/p lobectomy 2008  -  Not on home O2 therapy  - Lactic acid mildly elevated, now resolved  - Levophed PRN, not currently requiring  - CXR mild interstitial prominence  - CT shows scattered groundglass opacities  - Blood, urine, sputum cx pending  - Continue unasyn and azithromycin for now  - IV steroids today  - Continue gentle fluids   - Tele  - Supplemental 02  - Echocardiogram with normal EF, pulm HTN           Acute hypoxemic respiratory failure (CMS-HCC)- (present on admission)   Assessment & Plan    - Hx COPD secondary to tobacco use, cancer s/p lobectomy  - Echo shows pulm HTN  - Now requiring supplemental 02  - Duoneb PRN  - IV abx, IV steroids, duoneb PRN  - Respiratory protocol  - Patient's code status updated to reflect the fact that she is amenable to intubation if needed, but does not want chest compressions or defibrillation          Elevated troponin- (present on admission)   Assessment & Plan    - Negative for ischemic changes in the EKG, denies chest pain   - Trop was 0.21 on admit. Trending down.  - Likely demand ischemia, treat lung pathology  - Tele  - Monitor         CAP (community acquired pneumonia)- (present on admission)   Assessment & Plan    - CT shows scattered groundglass opacities that may indicate pneumonia  - Currently being treated with IV unasyn, azithromycin  - Monitor for response to treatment  - Procalcitonin WNL, WBC count normal, afebrile         COPD with acute exacerbation (CMS-[facility])- (present on admission)   Assessment & Plan    - 55 pack year tobacco history  - Continues to use tobacco  - Hypoxic on room, not on home 02  - Supplemental 02 PRN  - Azithromycin, unasyn  - Steroids on board  - Duoneb scheduled  - IS, expectorants  - Recommend tobacco cessation         History of lung cancer- (present on  admission)   Assessment & Plan    - S/p right upper lobectomy in 2008 during same surgery CABG was performed. Performed at [facility] in [facility].  - Not followed by oncology  - CT chest shows scattered opacities  - Continues to use tobacco         Hypothyroidism- (present on admission)   Assessment & Plan    - TSH WNL    - Continue home med        Diabetes mellitus type 2, controlled (CMS-HCC)- (present on admission)   Assessment & Plan    - Held home metformin  - ISS/hypoglycemic protocol inpatient and monitor glucose level  - Last Hb A1C 6.8         Severe episode of recurrent major depressive disorder, without psychotic features (CMS-[facility])- (present on admission)   Assessment & Plan    - Continue home medications             DISPO: ICU    CODE STATUS: No chest compression, no defibrillations, ok to intubate of required    Quality Measures:  [place] Catheter: No  DVT Prophylaxis: Heparin  Ulcer Prophylaxis: Omeprazole  Antibiotics: IV unasyn, PO azithromycin (day 3)  Lines: PIV, Right IJ central line

## 2018-03-22 NOTE — PROGRESS NOTES
Patient complaining of SOB, Tachypnea, Anxiety, chest tightness. SBP 240s-130s, HR: 153.  RN notified Dr. Claire. New orders received for Anxiolytic.

## 2018-03-22 NOTE — THERAPY
"Speech Language Therapy dysphagia treatment completed.   Functional Status:  Patient seen as a high follow up for dysphagia therapy on this date.  Patient stated she was having anxiety due to difficulty breathing.  However, SpO2 was maintained above 95% but she is having increased oxygen needs; currently on 10L via oxymask as compared to 5L yesterday.  Presentation of PO included soft solids, purees, nectars, and thin liquids.  The patient presented with s/sx concerning for penetration/aspiration as seen by coughing/choking with ~30% of soft solids and 75% of thin liquids.  RN at bedside with 2 pills floated in puree which resulted in delayed coughing. She had increased WOB and wheezing as the session progressed even with purees and nectars, therefore, PO was discontinued.    Recommendations: At this time, recommend the patient become NPO pending FEES this morning.     Plan of Care: Will benefit from Speech Therapy 3 times per week  Post-Acute Therapy: To be determined.     See \"Rehab Therapy-Acute\" Patient Summary Report for complete documentation.     "

## 2018-03-22 NOTE — THERAPY
"Speech Language Therapy FEES completed.  Functional Status: FEES procedure explained, the patient agreed to proceed and tolerated well.  Upon insertion of the scope, mild arytenoid edema was noted but the vocal cords appeared symmetrical and complete vocal cord adduction was achieved during phonation.  Of note, there was a pronounced gap between the true vocal folds and the ventricular folds. Presentation of PO included ice chips, nectars, purees, pudding, soft and mixed solids, dry solids, and thin liquids.  The patient presented with mild pharyngeal dysphagia as seen by reduced tongue base retraction, and impaired laryngeal elevation.  Subsequent premature spillage was observed to the valleculae and pyriform sinuses with all consistencies as well as mild residue with soft solids, pudding, and purees but cleared with cues to a double swallow.  Penetration over the backside of the epiglottis was visualized before the swallow with nectars and thins and over the right lateral edge of the laryngeal rim with juice from fruit.  NO aspiration was observed with any consistency consumed.  At this time, recommend Dysphagia II/thins with close monitoring during meals.  SLP following    Recommendations - Diet: Diet / Liquid Recommendation: Dysphagia II/thins                          Strategies: Monitor during meals, Assistance needed for meal tray set-up, No Straws and Head of Bed at 90 Degrees                          Medication Administration: Medication Administration : Float Whole with Puree  Plan of Care: Will benefit from Speech Therapy 3 times per week  Post-Acute Therapy: To be determined.     See \"Rehab Therapy-Acute\" Patient Summary Report for complete documentation.   "

## 2018-03-23 ENCOUNTER — APPOINTMENT (OUTPATIENT)
Dept: VASCULAR LAB | Facility: MEDICAL CENTER | Age: 78
End: 2018-03-23
Payer: MEDICARE

## 2018-03-23 ENCOUNTER — APPOINTMENT (OUTPATIENT)
Dept: RADIOLOGY | Facility: MEDICAL CENTER | Age: 78
DRG: 871 | End: 2018-03-23
Attending: INTERNAL MEDICINE
Payer: MEDICARE

## 2018-03-23 PROBLEM — E87.8 ELECTROLYTE ABNORMALITY: Status: ACTIVE | Noted: 2018-03-23

## 2018-03-23 PROBLEM — R13.10 DYSPHAGIA: Status: ACTIVE | Noted: 2018-03-23

## 2018-03-23 PROBLEM — E87.6 HYPOKALEMIA: Status: ACTIVE | Noted: 2018-03-23

## 2018-03-23 LAB
ALBUMIN SERPL BCP-MCNC: 3.1 G/DL (ref 3.2–4.9)
ALBUMIN SERPL BCP-MCNC: 3.2 G/DL (ref 3.2–4.9)
ALBUMIN/GLOB SERPL: 1.1 G/DL
ALBUMIN/GLOB SERPL: 1.1 G/DL
ALP SERPL-CCNC: 103 U/L (ref 30–99)
ALP SERPL-CCNC: 88 U/L (ref 30–99)
ALT SERPL-CCNC: 21 U/L (ref 2–50)
ALT SERPL-CCNC: 21 U/L (ref 2–50)
ANION GAP SERPL CALC-SCNC: 10 MMOL/L (ref 0–11.9)
ANION GAP SERPL CALC-SCNC: 13 MMOL/L (ref 0–11.9)
AST SERPL-CCNC: 52 U/L (ref 12–45)
AST SERPL-CCNC: 60 U/L (ref 12–45)
BASOPHILS # BLD AUTO: 0 % (ref 0–1.8)
BASOPHILS # BLD: 0 K/UL (ref 0–0.12)
BILIRUB SERPL-MCNC: 0.3 MG/DL (ref 0.1–1.5)
BILIRUB SERPL-MCNC: 0.3 MG/DL (ref 0.1–1.5)
BUN SERPL-MCNC: 12 MG/DL (ref 8–22)
BUN SERPL-MCNC: 13 MG/DL (ref 8–22)
CALCIUM SERPL-MCNC: 8.1 MG/DL (ref 8.5–10.5)
CALCIUM SERPL-MCNC: 8.4 MG/DL (ref 8.5–10.5)
CHLORIDE SERPL-SCNC: 97 MMOL/L (ref 96–112)
CHLORIDE SERPL-SCNC: 98 MMOL/L (ref 96–112)
CO2 SERPL-SCNC: 29 MMOL/L (ref 20–33)
CO2 SERPL-SCNC: 30 MMOL/L (ref 20–33)
CREAT SERPL-MCNC: 0.51 MG/DL (ref 0.5–1.4)
CREAT SERPL-MCNC: 0.66 MG/DL (ref 0.5–1.4)
EOSINOPHIL # BLD AUTO: 0 K/UL (ref 0–0.51)
EOSINOPHIL NFR BLD: 0 % (ref 0–6.9)
ERYTHROCYTE [DISTWIDTH] IN BLOOD BY AUTOMATED COUNT: 45.2 FL (ref 35.9–50)
GLOBULIN SER CALC-MCNC: 2.7 G/DL (ref 1.9–3.5)
GLOBULIN SER CALC-MCNC: 3 G/DL (ref 1.9–3.5)
GLUCOSE BLD-MCNC: 146 MG/DL (ref 65–99)
GLUCOSE BLD-MCNC: 154 MG/DL (ref 65–99)
GLUCOSE BLD-MCNC: 173 MG/DL (ref 65–99)
GLUCOSE BLD-MCNC: 189 MG/DL (ref 65–99)
GLUCOSE SERPL-MCNC: 158 MG/DL (ref 65–99)
GLUCOSE SERPL-MCNC: 164 MG/DL (ref 65–99)
HCT VFR BLD AUTO: 35.4 % (ref 37–47)
HGB BLD-MCNC: 11.7 G/DL (ref 12–16)
LACTATE BLD-SCNC: 1.2 MMOL/L (ref 0.5–2)
LYMPHOCYTES # BLD AUTO: 1.41 K/UL (ref 1–4.8)
LYMPHOCYTES NFR BLD: 9.4 % (ref 22–41)
MAGNESIUM SERPL-MCNC: 1.3 MG/DL (ref 1.5–2.5)
MANUAL DIFF BLD: NORMAL
MCH RBC QN AUTO: 31.1 PG (ref 27–33)
MCHC RBC AUTO-ENTMCNC: 33.1 G/DL (ref 33.6–35)
MCV RBC AUTO: 94.1 FL (ref 81.4–97.8)
METAMYELOCYTES NFR BLD MANUAL: 0.9 %
MONOCYTES # BLD AUTO: 0.51 K/UL (ref 0–0.85)
MONOCYTES NFR BLD AUTO: 3.4 % (ref 0–13.4)
MORPHOLOGY BLD-IMP: NORMAL
NEUTROPHILS # BLD AUTO: 12.95 K/UL (ref 2–7.15)
NEUTROPHILS NFR BLD: 86.3 % (ref 44–72)
NRBC # BLD AUTO: 0 K/UL
NRBC BLD-RTO: 0 /100 WBC
PHOSPHATE SERPL-MCNC: 3.5 MG/DL (ref 2.5–4.5)
PLATELET # BLD AUTO: 312 K/UL (ref 164–446)
PLATELET BLD QL SMEAR: NORMAL
PMV BLD AUTO: 9.3 FL (ref 9–12.9)
POTASSIUM SERPL-SCNC: 2.9 MMOL/L (ref 3.6–5.5)
POTASSIUM SERPL-SCNC: 3.4 MMOL/L (ref 3.6–5.5)
PROT SERPL-MCNC: 5.8 G/DL (ref 6–8.2)
PROT SERPL-MCNC: 6.2 G/DL (ref 6–8.2)
RBC # BLD AUTO: 3.76 M/UL (ref 4.2–5.4)
RBC BLD AUTO: NORMAL
SODIUM SERPL-SCNC: 137 MMOL/L (ref 135–145)
SODIUM SERPL-SCNC: 140 MMOL/L (ref 135–145)
WBC # BLD AUTO: 15 K/UL (ref 4.8–10.8)

## 2018-03-23 PROCEDURE — 700111 HCHG RX REV CODE 636 W/ 250 OVERRIDE (IP): Performed by: INTERNAL MEDICINE

## 2018-03-23 PROCEDURE — 700105 HCHG RX REV CODE 258: Performed by: STUDENT IN AN ORGANIZED HEALTH CARE EDUCATION/TRAINING PROGRAM

## 2018-03-23 PROCEDURE — 85027 COMPLETE CBC AUTOMATED: CPT

## 2018-03-23 PROCEDURE — 80053 COMPREHEN METABOLIC PANEL: CPT | Mod: 91

## 2018-03-23 PROCEDURE — 71045 X-RAY EXAM CHEST 1 VIEW: CPT

## 2018-03-23 PROCEDURE — 84100 ASSAY OF PHOSPHORUS: CPT

## 2018-03-23 PROCEDURE — 700101 HCHG RX REV CODE 250: Performed by: INTERNAL MEDICINE

## 2018-03-23 PROCEDURE — 83605 ASSAY OF LACTIC ACID: CPT

## 2018-03-23 PROCEDURE — 85007 BL SMEAR W/DIFF WBC COUNT: CPT

## 2018-03-23 PROCEDURE — 770022 HCHG ROOM/CARE - ICU (200)

## 2018-03-23 PROCEDURE — 700102 HCHG RX REV CODE 250 W/ 637 OVERRIDE(OP): Performed by: INTERNAL MEDICINE

## 2018-03-23 PROCEDURE — A9270 NON-COVERED ITEM OR SERVICE: HCPCS | Performed by: INTERNAL MEDICINE

## 2018-03-23 PROCEDURE — 700111 HCHG RX REV CODE 636 W/ 250 OVERRIDE (IP): Performed by: STUDENT IN AN ORGANIZED HEALTH CARE EDUCATION/TRAINING PROGRAM

## 2018-03-23 PROCEDURE — 700105 HCHG RX REV CODE 258: Performed by: INTERNAL MEDICINE

## 2018-03-23 PROCEDURE — 82962 GLUCOSE BLOOD TEST: CPT | Mod: 91

## 2018-03-23 PROCEDURE — 83735 ASSAY OF MAGNESIUM: CPT

## 2018-03-23 PROCEDURE — 94640 AIRWAY INHALATION TREATMENT: CPT

## 2018-03-23 RX ORDER — FUROSEMIDE 10 MG/ML
20 INJECTION INTRAMUSCULAR; INTRAVENOUS ONCE
Status: COMPLETED | OUTPATIENT
Start: 2018-03-23 | End: 2018-03-23

## 2018-03-23 RX ORDER — CALCIUM CARBONATE 500 MG/1
500 TABLET, CHEWABLE ORAL DAILY
Status: DISCONTINUED | OUTPATIENT
Start: 2018-03-24 | End: 2018-03-23

## 2018-03-23 RX ORDER — MAGNESIUM SULFATE HEPTAHYDRATE 40 MG/ML
2 INJECTION, SOLUTION INTRAVENOUS ONCE
Status: COMPLETED | OUTPATIENT
Start: 2018-03-23 | End: 2018-03-23

## 2018-03-23 RX ORDER — HYDRALAZINE HYDROCHLORIDE 20 MG/ML
20 INJECTION INTRAMUSCULAR; INTRAVENOUS EVERY 6 HOURS PRN
Status: DISCONTINUED | OUTPATIENT
Start: 2018-03-23 | End: 2018-03-29 | Stop reason: HOSPADM

## 2018-03-23 RX ORDER — OXYCODONE HYDROCHLORIDE AND ACETAMINOPHEN 5; 325 MG/1; MG/1
1 TABLET ORAL EVERY 4 HOURS PRN
Status: DISCONTINUED | OUTPATIENT
Start: 2018-03-23 | End: 2018-03-29 | Stop reason: HOSPADM

## 2018-03-23 RX ORDER — POTASSIUM CHLORIDE 20 MEQ/1
40 TABLET, EXTENDED RELEASE ORAL ONCE
Status: DISCONTINUED | OUTPATIENT
Start: 2018-03-23 | End: 2018-03-23

## 2018-03-23 RX ORDER — METHYLPREDNISOLONE SODIUM SUCCINATE 125 MG/2ML
62.5 INJECTION, POWDER, LYOPHILIZED, FOR SOLUTION INTRAMUSCULAR; INTRAVENOUS EVERY 12 HOURS
Status: DISCONTINUED | OUTPATIENT
Start: 2018-03-23 | End: 2018-03-23

## 2018-03-23 RX ORDER — ALPRAZOLAM 0.25 MG/1
0.25 TABLET ORAL NIGHTLY PRN
Status: DISCONTINUED | OUTPATIENT
Start: 2018-03-23 | End: 2018-03-24

## 2018-03-23 RX ORDER — BUDESONIDE 0.5 MG/2ML
0.5 INHALANT ORAL
Status: DISCONTINUED | OUTPATIENT
Start: 2018-03-23 | End: 2018-03-26

## 2018-03-23 RX ORDER — QUETIAPINE FUMARATE 25 MG/1
25 TABLET, FILM COATED ORAL EVERY EVENING
Status: DISCONTINUED | OUTPATIENT
Start: 2018-03-23 | End: 2018-03-28

## 2018-03-23 RX ORDER — TIOTROPIUM BROMIDE 18 UG/1
1 CAPSULE ORAL; RESPIRATORY (INHALATION)
Status: DISCONTINUED | OUTPATIENT
Start: 2018-03-23 | End: 2018-03-27

## 2018-03-23 RX ORDER — FUROSEMIDE 20 MG/1
20 TABLET ORAL
Status: DISCONTINUED | OUTPATIENT
Start: 2018-03-23 | End: 2018-03-29 | Stop reason: HOSPADM

## 2018-03-23 RX ORDER — LORAZEPAM 2 MG/ML
INJECTION INTRAMUSCULAR
Status: COMPLETED
Start: 2018-03-23 | End: 2018-03-23

## 2018-03-23 RX ORDER — IPRATROPIUM BROMIDE AND ALBUTEROL SULFATE 2.5; .5 MG/3ML; MG/3ML
3 SOLUTION RESPIRATORY (INHALATION)
Status: DISCONTINUED | OUTPATIENT
Start: 2018-03-23 | End: 2018-03-24

## 2018-03-23 RX ORDER — POTASSIUM CHLORIDE 20 MEQ/1
20 TABLET, EXTENDED RELEASE ORAL DAILY
Status: DISCONTINUED | OUTPATIENT
Start: 2018-03-23 | End: 2018-03-29 | Stop reason: HOSPADM

## 2018-03-23 RX ORDER — METHYLPREDNISOLONE SODIUM SUCCINATE 125 MG/2ML
62.5 INJECTION, POWDER, LYOPHILIZED, FOR SOLUTION INTRAMUSCULAR; INTRAVENOUS EVERY 8 HOURS
Status: DISCONTINUED | OUTPATIENT
Start: 2018-03-23 | End: 2018-03-24

## 2018-03-23 RX ORDER — DEXTROSE MONOHYDRATE 25 G/50ML
25 INJECTION, SOLUTION INTRAVENOUS
Status: DISCONTINUED | OUTPATIENT
Start: 2018-03-23 | End: 2018-03-29 | Stop reason: HOSPADM

## 2018-03-23 RX ORDER — IPRATROPIUM BROMIDE AND ALBUTEROL SULFATE 2.5; .5 MG/3ML; MG/3ML
3 SOLUTION RESPIRATORY (INHALATION)
Status: DISCONTINUED | OUTPATIENT
Start: 2018-03-23 | End: 2018-03-26

## 2018-03-23 RX ORDER — CALCIUM CARBONATE 500 MG/1
500 TABLET, CHEWABLE ORAL EVERY 4 HOURS PRN
Status: DISCONTINUED | OUTPATIENT
Start: 2018-03-23 | End: 2018-03-29 | Stop reason: HOSPADM

## 2018-03-23 RX ORDER — LOSARTAN POTASSIUM 50 MG/1
50 TABLET ORAL
Status: DISCONTINUED | OUTPATIENT
Start: 2018-03-23 | End: 2018-03-29 | Stop reason: HOSPADM

## 2018-03-23 RX ADMIN — MAGNESIUM SULFATE IN WATER 2 G: 40 INJECTION, SOLUTION INTRAVENOUS at 10:40

## 2018-03-23 RX ADMIN — FUROSEMIDE 20 MG: 10 INJECTION, SOLUTION INTRAMUSCULAR; INTRAVENOUS at 22:00

## 2018-03-23 RX ADMIN — ALPRAZOLAM 0.25 MG: 0.25 TABLET ORAL at 23:17

## 2018-03-23 RX ADMIN — POTASSIUM CHLORIDE 40 MEQ: 2 INJECTION, SOLUTION, CONCENTRATE INTRAVENOUS at 13:08

## 2018-03-23 RX ADMIN — FUROSEMIDE 20 MG: 10 INJECTION, SOLUTION INTRAMUSCULAR; INTRAVENOUS at 01:13

## 2018-03-23 RX ADMIN — IPRATROPIUM BROMIDE AND ALBUTEROL SULFATE 3 ML: .5; 3 SOLUTION RESPIRATORY (INHALATION) at 08:11

## 2018-03-23 RX ADMIN — IPRATROPIUM BROMIDE AND ALBUTEROL SULFATE 3 ML: .5; 3 SOLUTION RESPIRATORY (INHALATION) at 11:00

## 2018-03-23 RX ADMIN — METHYLPREDNISOLONE SODIUM SUCCINATE 62.5 MG: 125 INJECTION, POWDER, FOR SOLUTION INTRAMUSCULAR; INTRAVENOUS at 15:20

## 2018-03-23 RX ADMIN — LOSARTAN POTASSIUM 50 MG: 50 TABLET, FILM COATED ORAL at 20:20

## 2018-03-23 RX ADMIN — BUDESONIDE 0.5 MG: 0.5 SUSPENSION RESPIRATORY (INHALATION) at 19:28

## 2018-03-23 RX ADMIN — AMPICILLIN SODIUM AND SULBACTAM SODIUM 3 G: 2; 1 INJECTION, POWDER, FOR SOLUTION INTRAMUSCULAR; INTRAVENOUS at 13:19

## 2018-03-23 RX ADMIN — OXYCODONE HYDROCHLORIDE AND ACETAMINOPHEN 1 TABLET: 5; 325 TABLET ORAL at 21:24

## 2018-03-23 RX ADMIN — AMPICILLIN SODIUM AND SULBACTAM SODIUM 3 G: 2; 1 INJECTION, POWDER, FOR SOLUTION INTRAMUSCULAR; INTRAVENOUS at 23:41

## 2018-03-23 RX ADMIN — NICOTINE 7 MG: 7 PATCH, EXTENDED RELEASE TRANSDERMAL at 18:10

## 2018-03-23 RX ADMIN — HYDRALAZINE HYDROCHLORIDE 20 MG: 20 INJECTION INTRAMUSCULAR; INTRAVENOUS at 21:24

## 2018-03-23 RX ADMIN — INSULIN HUMAN 2 UNITS: 100 INJECTION, SOLUTION PARENTERAL at 18:28

## 2018-03-23 RX ADMIN — METHYLPREDNISOLONE SODIUM SUCCINATE 62.5 MG: 125 INJECTION, POWDER, FOR SOLUTION INTRAMUSCULAR; INTRAVENOUS at 01:13

## 2018-03-23 RX ADMIN — ANTACID TABLETS 500 MG: 500 TABLET, CHEWABLE ORAL at 23:41

## 2018-03-23 RX ADMIN — AMPICILLIN SODIUM AND SULBACTAM SODIUM 3 G: 2; 1 INJECTION, POWDER, FOR SOLUTION INTRAMUSCULAR; INTRAVENOUS at 06:06

## 2018-03-23 RX ADMIN — METHYLPREDNISOLONE SODIUM SUCCINATE 62.5 MG: 125 INJECTION, POWDER, FOR SOLUTION INTRAMUSCULAR; INTRAVENOUS at 19:56

## 2018-03-23 RX ADMIN — BUDESONIDE 0.5 MG: 0.5 SUSPENSION RESPIRATORY (INHALATION) at 08:11

## 2018-03-23 RX ADMIN — QUETIAPINE FUMARATE 25 MG: 25 TABLET ORAL at 19:53

## 2018-03-23 RX ADMIN — AMPICILLIN SODIUM AND SULBACTAM SODIUM 3 G: 2; 1 INJECTION, POWDER, FOR SOLUTION INTRAMUSCULAR; INTRAVENOUS at 18:19

## 2018-03-23 RX ADMIN — FUROSEMIDE 20 MG: 40 TABLET ORAL at 19:53

## 2018-03-23 RX ADMIN — INSULIN HUMAN 2 UNITS: 100 INJECTION, SOLUTION PARENTERAL at 20:24

## 2018-03-23 RX ADMIN — IPRATROPIUM BROMIDE AND ALBUTEROL SULFATE 3 ML: .5; 3 SOLUTION RESPIRATORY (INHALATION) at 14:45

## 2018-03-23 RX ADMIN — FUROSEMIDE 20 MG: 10 INJECTION, SOLUTION INTRAMUSCULAR; INTRAVENOUS at 04:13

## 2018-03-23 RX ADMIN — IPRATROPIUM BROMIDE AND ALBUTEROL SULFATE 3 ML: .5; 3 SOLUTION RESPIRATORY (INHALATION) at 22:21

## 2018-03-23 RX ADMIN — TIOTROPIUM BROMIDE 1 CAPSULE: 18 CAPSULE ORAL; RESPIRATORY (INHALATION) at 08:12

## 2018-03-23 RX ADMIN — IPRATROPIUM BROMIDE AND ALBUTEROL SULFATE 3 ML: .5; 3 SOLUTION RESPIRATORY (INHALATION) at 19:28

## 2018-03-23 RX ADMIN — METHYLPREDNISOLONE SODIUM SUCCINATE 62.5 MG: 125 INJECTION, POWDER, FOR SOLUTION INTRAMUSCULAR; INTRAVENOUS at 08:07

## 2018-03-23 RX ADMIN — AMPICILLIN SODIUM AND SULBACTAM SODIUM 3 G: 2; 1 INJECTION, POWDER, FOR SOLUTION INTRAMUSCULAR; INTRAVENOUS at 01:14

## 2018-03-23 RX ADMIN — POTASSIUM CHLORIDE 20 MEQ: 1500 TABLET, EXTENDED RELEASE ORAL at 21:23

## 2018-03-23 RX ADMIN — POTASSIUM CHLORIDE 40 MEQ: 2 INJECTION, SOLUTION, CONCENTRATE INTRAVENOUS at 01:51

## 2018-03-23 ASSESSMENT — LIFESTYLE VARIABLES
DO YOU DRINK ALCOHOL: NO
DO YOU DRINK ALCOHOL: NO

## 2018-03-23 ASSESSMENT — PATIENT HEALTH QUESTIONNAIRE - PHQ9
1. LITTLE INTEREST OR PLEASURE IN DOING THINGS: NOT AT ALL
2. FEELING DOWN, DEPRESSED, IRRITABLE, OR HOPELESS: NOT AT ALL
SUM OF ALL RESPONSES TO PHQ9 QUESTIONS 1 AND 2: 0
SUM OF ALL RESPONSES TO PHQ9 QUESTIONS 1 AND 2: 0
2. FEELING DOWN, DEPRESSED, IRRITABLE, OR HOPELESS: NOT AT ALL
2. FEELING DOWN, DEPRESSED, IRRITABLE, OR HOPELESS: NOT AT ALL
SUM OF ALL RESPONSES TO PHQ9 QUESTIONS 1 AND 2: 0
1. LITTLE INTEREST OR PLEASURE IN DOING THINGS: NOT AT ALL
1. LITTLE INTEREST OR PLEASURE IN DOING THINGS: NOT AT ALL

## 2018-03-23 ASSESSMENT — ENCOUNTER SYMPTOMS
LOSS OF CONSCIOUSNESS: 0
FOCAL WEAKNESS: 0
SPUTUM PRODUCTION: 1
CHILLS: 1
FALLS: 0
FEVER: 1
HEADACHES: 0
HEMOPTYSIS: 0
COUGH: 1
BACK PAIN: 1
SPEECH CHANGE: 0
SHORTNESS OF BREATH: 1
WHEEZING: 1
NAUSEA: 0
VOMITING: 0
PALPITATIONS: 0
WEIGHT LOSS: 1

## 2018-03-23 ASSESSMENT — PAIN SCALES - GENERAL
PAINLEVEL_OUTOF10: 0
PAINLEVEL_OUTOF10: 7
PAINLEVEL_OUTOF10: 0

## 2018-03-23 NOTE — PROGRESS NOTES
RN spoke with [facility] Resident, Dr. Wilburn regarding patient low K+, and need for NPO and Accuchecks. New orders received.

## 2018-03-23 NOTE — PROGRESS NOTES
Patient complaining of SOB and anxiety.  , 's.  MD made at bedside. Morphine, lasix, and solu-cortef given with no relief.  Family and patient agree comfort care is best option.

## 2018-03-23 NOTE — PROGRESS NOTES
[facility] ICU Progress Note      Admit Date: 3/20/2018  ICU Day: 4    Resident(s): Tomer Schultz  Attending: JULIEN FELDMAN/ Dr. Claire    Date & Time:   3/23/2018   11:28 AM       Patient ID:    Name:             Mia Hammond   YOB: 1940  Age:                 77 y.o.  female   MRN:               [id number]    ID:  77 year old female with a PMH of current tobacco use, COPD not on home 02 lung cancer s/p lobectomy, coronary artery disease s/p CABG presented to the ED on 3/20/2018 with complaints of SOB, cough productive of green sputum. Patient was seen at urgent care 3 days prior to admit and placed on azithromycin which did not relieve the patient's symptoms. Her symptoms continued to worsen and returned to urgent care. She was found to be hypoxic on room air Sp02 in 70's and was sent to the ED here at [facility]. Patient was found to be hypotensive in the ED and after 1.6L IV fluid bolus per sepsis protocol her blood pressure remained low. At that time a central line in right IJ was placed for pressor therapy which she only required briefly in the ICU. Oxygen requirement's increased from 4 L nasal cannula to 4 L oxymask and lung auscultation revealed diffuse scattered wheezing. She was initiated on IV unasyn and azithromycin and started on IV steroids. Patient was placed in ICU for pressors and close observation.    Consultants:  PMA: Dr. Claire    Interval Events:  Hemodynamically unstable overnight, end-stage COPD, was made Comfort Care  Subsequent family meeting reversed above, no longer Comfort Care  Lasix 20 mg IV x2 overnight, Unasyn and solumedrol restarted (were stopped for Comfort Care)   over 24 hr  CXR: improved pulm edema  Was ST 150s-160s yesterday evening, overnight improved to ST 100s-110s  SBP 120s-160s  Was on dysphagia 2 yesterday after FEES, made NPO overnight due to clinical worsening  Waiting for Speech to re-evaluated before starting diet/PO meds  Decreased  "steroid frequency from 62.5 q12h to q8h  BG 130s-170s  [place], [facility]  Low K and Mg overnight and this morning, repleted  Unasyn day 4/5  WBC 10.4 --> 13.8 -->15.0, afebrile, lactic acid 1.2  BCx/UCx NGTD  O2 sat >90%, oxymask 10-->8  Duoneb, Pulmicort, RT  Mobilize      Review of Systems   Constitutional: Positive for chills, fever, malaise/fatigue and weight loss.   Respiratory: Positive for cough, sputum production, shortness of breath and wheezing. Negative for hemoptysis.    Cardiovascular: Negative for chest pain, palpitations and leg swelling.   Gastrointestinal: Negative for nausea and vomiting.   Musculoskeletal: Positive for back pain. Negative for falls.   Neurological: Negative for speech change, focal weakness, loss of consciousness and headaches.       PHYSICAL EXAM  Vitals:    03/23/18 0730 03/23/18 0813 03/23/18 1000 03/23/18 1100   BP:       Pulse: (!) 105 (!) 106 (!) 103 99   Resp:  (!) 21 19 (!) 33   Temp:       SpO2:  100%  99%   Weight:       Height:         Body mass index is 23.24 kg/m².  BP (!) 69/50   Pulse 99   Temp 36.9 °C (98.4 °F)   Resp (!) 33   Ht 1.5 m (4' 11.06\")   Wt 52.3 kg (115 lb 4.8 oz)   SpO2 99%   Breastfeeding? No   BMI 23.24 kg/m²   O2 therapy: Pulse Oximetry: 99 %, O2 (LPM): 5, O2 Delivery: Silicone Nasal Cannula    Physical Exam  Constitutional: She is oriented to person, place, and time. No distress.   Head: Normocephalic and atraumatic.   Central line placed on [facility]   Eyes: Pupils are equal, round, and reactive to light. Right eye exhibits no discharge. Left eye exhibits no discharge. No scleral icterus.   Neck: No JVD present. No tracheal deviation present.   Cardiovascular: Regular rhythm and normal heart sounds. Murmur noted.  Pulmonary/Chest: Diffusely decreased breath sounds. Effort normal. No stridor. She has diffuse rales, rhonchi. She exhibits no tenderness. Mild clubbing.  Abdominal: There is no tenderness. There is no rebound and no guarding. "   Musculoskeletal: No edema. She exhibits no tenderness.   Neurological: She is alert and oriented to person, place, and time.   Skin: Rash noted. She is not diaphoretic.       Respiratory:     Respiration: (!) 33, Pulse Oximetry: 99 %, O2 Daily Delivery Respiratory : Silicone Nasal Cannula    Chest Tube Drains:          Invalid input(s): [id number]    HemoDynamics:  Pulse: 99, Heart Rate (Monitored): (!) 109 NIBP: 150/70        Fluids:    Date 03/23/18 0700 - 03/24/18 0659   Shift 2525-1466 2158-2899 1337-4142 24 Hour Total   I  N  T  A  K  E   Shift Total       O  U  T  P  U  T   Urine 400   400      Indwelling Cathether 400   400    Stool          Number of Times Stooled 0 x   0 x    Shift Total 400   400   NET -400   -400        Intake/Output Summary (Last 24 hours) at 03/21/18 1446  Last data filed at 03/21/18 1200   Gross per 24 hour   Intake              480 ml   Output              900 ml   Net             -420 ml       Weight: 52.3 kg (115 lb 4.8 oz)  Body mass index is 23.24 kg/m².    Recent Labs      03/22/18   0610  03/23/18   0115  03/23/18   0610   SODIUM  138  137  140   POTASSIUM  3.4*  2.9*  3.4*   CHLORIDE  105  97  98   CO2  25  30  29   BUN  12  13  12   CREATININE  0.66  0.66  0.51   MAGNESIUM  1.6  1.3*   --    PHOSPHORUS  2.6  3.5   --    CALCIUM  8.1*  8.1*  8.4*       GI/Nutrition:  Recent Labs      03/20/18   2245   03/22/18   0610  03/23/18   0115  03/23/18   0610   ALTSGPT   --    < >  20  21  21   ASTSGOT   --    < >  39  52*  60*   ALKPHOSPHAT   --    < >  109*  103*  88   TBILIRUBIN   --    < >  0.3  0.3  0.3   GAMMAGT  70*   --    --    --    --    GLUCOSE   --    < >  177*  158*  164*    < > = values in this interval not displayed.       Heme:  Recent Labs      03/21/18   0445  03/22/18   0610  03/23/18   0115   RBC  3.62*  3.84*  3.76*   HEMOGLOBIN  11.6*  12.1  11.7*   HEMATOCRIT  34.2*  36.5*  35.4*   PLATELETCT  240  275  312   PROTHROMBTM  13.1   --    --    APTT  30.1   --     --    INR  1.02   --    --        Infectious Disease:  Temp  Av.9 °C (98.4 °F)  Min: 36.7 °C (98 °F)  Max: 37.2 °C (98.9 °F)  Recent Labs      18   0445  18   0610  18   0115  18   0610   WBC  10.4  13.8*  15.0*   --    NEUTSPOLYS  94.50*  90.40*  86.30*   --    LYMPHOCYTES  3.80*  5.20*  9.40*   --    MONOCYTES  1.10  4.40  3.40   --    EOSINOPHILS  0.00  0.00  0.00   --    BASOPHILS  0.10  0.00  0.00   --    ASTSGOT  41  39  52*  60*   ALTSGPT  16  20  21  21   ALKPHOSPHAT  88  109*  103*  88   TBILIRUBIN  0.3  0.3  0.3  0.3       Meds:  • ampicillin-sulbactam (UNASYN) IV  3 g Stopped (18 0636)   • insulin regular  2-9 Units      And   • glucose 4 g  16 g      And   • dextrose 50%  25 mL     • ipratropium-albuterol  3 mL     • ipratropium-albuterol  3 mL     • budesonide  0.5 mg     • Respiratory Care per Protocol       • tiotropium  1 Cap     • magnesium sulfate  2 g 2 g (18 1040)   • methylPREDNISolone  62.5 mg     • potassium chloride (KCL-CENTRAL) IV *Administer in ICU only*  40 mEq     • atropine  2 Drop     • lidocaine viscous 2%  5 mL     • glycopyrrolate  1 mg          Procedures:  Right IJ central catheter 3/21    Imaging:  DX-CHEST-PORTABLE (1 VIEW)   Final Result      Findings consistent with pulmonary edema, improved compared with 3/22.      DX-CHEST-PORTABLE (1 VIEW)   Final Result      Moderate diffuse interstitial opacities increased when compared to previous and consistent with edema or pneumonitis. No pneumothorax.   Ill-defined opacities left lung apex appears similar to recent findings. Continue follow-up.      ECHOCARDIOGRAM COMP W/O CONT   Final Result      CT-CTA CHEST PULMONARY ARTERY W/ RECONS   Final Result   Addendum 1 of 1   Right central venous catheter with tip in the mid SVC.      Final         1. No CT evidence of pulmonary embolism.      2. Groundglass and airspace opacities in the left lingula and left lower lobe. Small areas of peripheral  groundglass opacities in the right lung as well. These are concerning for multifocal pneumonia. The largest area is in the left lower lobe.      3. Postsurgical change in the left upper lobe with nodular opacities. Comparison to prior study could be helpful for interval change.          Problem and Plan:      * Septic shock (CMS-HCC)- (present on admission)   Assessment & Plan    - SIRS 3/4 on admit (left shift, HR, RR)  -   Presented with SOB, cough productive green sputum  -   Hx COPD 2/2 tobacco, lung cancer s/p lobectomy 2008  -  Not on home O2 therapy  - Lactic acid mildly elevated, now resolved  - CXR pulm edema  - CT shows scattered groundglass opacities  - Blood, urine cx negative  - Continue unasyn  - IV steroids  - Tele  - Supplemental 02  - Echocardiogram with normal EF, pulm HTN        Acute hypoxemic respiratory failure (CMS-HCC)- (present on admission)   Assessment & Plan    - Hx COPD secondary to tobacco use, cancer s/p lobectomy  - Echo shows pulm HTN  - Now requiring supplemental 02  - Duoneb PRN  - IV abx, IV steroids, duoneb  - Respiratory protocol        Elevated troponin- (present on admission)   Assessment & Plan    - Negative for ischemic changes in the EKG, denies chest pain   - Trop was 0.21 on admit. Trending down.  - Likely demand ischemia, treat lung pathology  - Tele  - Monitor         CAP (community acquired pneumonia)- (present on admission)   Assessment & Plan    - CT shows scattered groundglass opacities that may indicate pneumonia  - Currently being treated with IV unasyn  - Monitor for response to treatment  - Procalcitonin WNL, WBC elevated, afebrile         COPD with acute exacerbation (CMS-HCC)- (present on admission)   Assessment & Plan    - 55 pack year tobacco history  - Continues to use tobacco  - Hypoxic on room, not on home 02  - Supplemental 02 PRN  - Unasyn  - Steroids on board  - Duoneb scheduled  - IS, expectorants  - Recommend tobacco cessation        History of lung  cancer- (present on admission)   Assessment & Plan    - S/p right upper lobectomy in 2008 during same surgery CABG was performed. Performed at [facility] in [facility].  - Not followed by oncology  - CT chest shows scattered opacities  - Continues to use tobacco         Dysphagia   Assessment & Plan    - Was on dysphagia 2 yesterday after FEES, made NPO overnight due to clinical worsening  - Waiting for Speech to re-evaluated before starting diet/PO meds        Electrolyte abnormality   Assessment & Plan    - monitor and replete K and Mg, IV until Speech approves PO meds        Hypothyroidism- (present on admission)   Assessment & Plan    - TSH WNL    - Continue home med        Diabetes mellitus type 2, controlled (CMS-HCC)- (present on admission)   Assessment & Plan    - Held home metformin  - ISS/hypoglycemic protocol inpatient and monitor glucose level  - Last Hb A1C 6.8         Severe episode of recurrent major depressive disorder, without psychotic features (CMS-HCC)- (present on admission)   Assessment & Plan    - Continue home medications             DISPO: ICU    CODE STATUS: No chest compression, no defibrillations, ok to intubate of required    Quality Measures:  [place] Catheter: No  DVT Prophylaxis: Heparin  Ulcer Prophylaxis: Omeprazole  Antibiotics: IV unasyn, PO azithromycin (day 3)  Lines: PIV, Right IJ central line

## 2018-03-23 NOTE — PROGRESS NOTES
"Paged at 11:45 informed patient decided not ready to die and wish to restart the treatment, daughter was at the bedside and long discussion with the family in the presence of the RN, patient alert and oriented to the time place person and situation, wish to restart the treatment and not ready to die yet, comfort care cancelled and patient still want the DNR Status     On exam A&O x4, blood pressure Blood pressure 157/52 , pulse  114, temperature 37.2 °C (98.9 °F), resp. rate (!) 26, height 1.499 m (4' 11\"), weight 49.1 kg (108 lb 3.9 oz), SpO2 92 % on 10 L mask    crackles bilateral mainly on the right side on chest ausculation, will order stat chest x ray, IV lasix 20 mg, restart Unasyn 3 gm, steroid IV methylprednisone 62 mg IV, repeat lactic acid and CMP for now.    "

## 2018-03-23 NOTE — PROGRESS NOTES
Patient woke up from being obtunded. A&Ox4. Patient  requesting bedpan. Family requesting Morphine drip to be stopped. Patient requesting to speak with family regarding code status change. RN paged on-call Intensivist. Awaiting call back.

## 2018-03-23 NOTE — PROGRESS NOTES
[facility] ICU Progress Note      Admit Date: 3/20/2018  ICU Day: 2    Resident(s): Dionisio Miller  Attending: JULIEN FELDMAN/ Dr. Claire    Date & Time:   3/23/2018   6:57 AM       Patient ID:    Name:             Mia Hammond   YOB: 1940  Age:                 77 y.o.  female   MRN:               [id number]    ID:  77 year old female with a PMH of current tobacco use, COPD not on home 02 lung cancer s/p lobectomy, coronary artery disease s/p CABG presented to the ED on 3/20/2018 with complaints of SOB, cough productive of green sputum. Patient was seen at urgent care 3 days prior to admit and placed on azithromycin which did not relieve the patient's symptoms. Her symptoms continued to worsen and returned to urgent care. She was found to be hypoxic on room air Sp02 in 70's and was sent to the ED here at [facility]. Patient was found to be hypotensive in the ED and after 1.6L IV fluid bolus per sepsis protocol her blood pressure remained low. At that time a central line in right IJ was placed for pressor therapy which she only required briefly in the ICU. Oxygen requirement's increased from 4 L nasal cannula to 4 L oxymask and lung auscultation revealed diffuse scattered wheezing. She was initiated on IV unasyn and azithromycin and started on IV steroids. Patient was placed in ICU for pressors and close observation.    Consultants:  PMA: Dr. Claire    Interval Events:  SOB feels improved this AM, less wheezing, less cough  Son at bedside  Levophed required only briefly, now off  Long discussion regarding code status. Patient amenable to intubation if needed, but did not want chest compression or defibrillation  NSR, sinus tachy overnight on tele  Good urine output  Lost 15 lbs the past few years, hx night sweats      Review of Systems   Constitutional: Positive for chills, fever, malaise/fatigue and weight loss.   Respiratory: Positive for cough, sputum production, shortness of breath and wheezing.  "Negative for hemoptysis.    Cardiovascular: Negative for chest pain, palpitations and leg swelling.   Gastrointestinal: Negative for nausea and vomiting.   Musculoskeletal: Positive for back pain. Negative for falls.   Neurological: Negative for speech change, focal weakness, loss of consciousness and headaches.       PHYSICAL EXAM  Vitals:    03/23/18 0300 03/23/18 0400 03/23/18 0500 03/23/18 0600   BP:       Pulse: (!) 102 98 98 (!) 112   Resp: 14 (!) 32 (!) 34 (!) 30   Temp:  36.7 °C (98.1 °F)  36.9 °C (98.4 °F)   SpO2: 98% 96% 96% 97%   Weight:       Height:         Body mass index is 23.24 kg/m².  BP (!) 69/50   Pulse (!) 112   Temp 36.9 °C (98.4 °F)   Resp (!) 30   Ht 1.5 m (4' 11.06\")   Wt 52.3 kg (115 lb 4.8 oz)   SpO2 97%   Breastfeeding? No   BMI 23.24 kg/m²    O2 therapy: Pulse Oximetry: 97 %, O2 (LPM): 5, O2 Delivery: Silicone Nasal Cannula    Physical Exam  Constitutional: She is oriented to person, place, and time. No distress.   Head: Normocephalic and atraumatic.   Central line placed on RIJ   Eyes: Pupils are equal, round, and reactive to light. Right eye exhibits no discharge. Left eye exhibits no discharge. No scleral icterus.   Neck: No JVD present. No tracheal deviation present.   Cardiovascular: Regular rhythm and normal heart sounds. Murmur noted.  Pulmonary/Chest: Diffusely decreased breath sounds. Effort normal. No stridor. She has rales. She exhibits no tenderness. Mild clubbing.  Abdominal: There is no tenderness. There is no rebound and no guarding.   Musculoskeletal: No edema. She exhibits no tenderness.   Neurological: She is alert and oriented to person, place, and time.   Skin: Rash noted. She is not diaphoretic.       Respiratory:     Respiration: (!) 30, Pulse Oximetry: 97 %, O2 Daily Delivery Respiratory : OxyMask    Chest Tube Drains:          Invalid input(s): [id number]    HemoDynamics:  Pulse: (!) 112, Heart Rate (Monitored): (!) 109 NIBP: 135/69        Fluids:   "      Intake/Output Summary (Last 24 hours) at 18 1446  Last data filed at 18 1200   Gross per 24 hour   Intake              480 ml   Output              900 ml   Net             -420 ml       Weight: 52.3 kg (115 lb 4.8 oz)  Body mass index is 23.24 kg/m².    Recent Labs      18   SODIUM  138  137  140   POTASSIUM  3.4*  2.9*  3.4*   CHLORIDE  105  97  98   CO2  25  30  29   BUN  12  13  12   CREATININE  0.66  0.66  0.51   MAGNESIUM  1.6  1.3*   --    PHOSPHORUS  2.6  3.5   --    CALCIUM  8.1*  8.1*  8.4*       GI/Nutrition:  Recent Labs      18   ALTSGPT   --    < >  20  21  21   ASTSGOT   --    < >  39  52*  60*   ALKPHOSPHAT   --    < >  109*  103*  88   TBILIRUBIN   --    < >  0.3  0.3  0.3   GAMMAGT  70*   --    --    --    --    GLUCOSE   --    < >  177*  158*  164*    < > = values in this interval not displayed.       Heme:  Recent Labs      18   RBC  3.62*  3.84*  3.76*   HEMOGLOBIN  11.6*  12.1  11.7*   HEMATOCRIT  34.2*  36.5*  35.4*   PLATELETCT  240  275  312   PROTHROMBTM  13.1   --    --    APTT  30.1   --    --    INR  1.02   --    --        Infectious Disease:  Temp  Av.9 °C (98.4 °F)  Min: 36.7 °C (98 °F)  Max: 37.2 °C (98.9 °F)  Recent Labs      18   WBC  10.4  13.8*  15.0*   --    NEUTSPOLYS  94.50*  90.40*  86.30*   --    LYMPHOCYTES  3.80*  5.20*  9.40*   --    MONOCYTES  1.10  4.40  3.40   --    EOSINOPHILS  0.00  0.00  0.00   --    BASOPHILS  0.10  0.00  0.00   --    ASTSGOT  41  39  52*  60*   ALTSGPT  16  20  21  21   ALKPHOSPHAT  88  109*  103*  88   TBILIRUBIN  0.3  0.3  0.3  0.3       Meds:  • methylPREDNISolone  62.5 mg     • ampicillin-sulbactam (UNASYN) IV  3 g Stopped (18 0636)   • insulin regular  2-9 Units      And   • glucose 4 g  16 g       And   • dextrose 50%  25 mL     • ipratropium-albuterol  3 mL     • ipratropium-albuterol  3 mL     • budesonide  0.5 mg     • Respiratory Care per Protocol       • atropine  2 Drop     • lidocaine viscous 2%  5 mL     • glycopyrrolate  1 mg          Procedures:  Right IJ central catheter 3/21    Imaging:  DX-CHEST-PORTABLE (1 VIEW)   Final Result      Findings consistent with pulmonary edema, improved compared with 3/22.      DX-CHEST-PORTABLE (1 VIEW)   Final Result      Moderate diffuse interstitial opacities increased when compared to previous and consistent with edema or pneumonitis. No pneumothorax.   Ill-defined opacities left lung apex appears similar to recent findings. Continue follow-up.      ECHOCARDIOGRAM COMP W/O CONT   Final Result      CT-CTA CHEST PULMONARY ARTERY W/ RECONS   Final Result   Addendum 1 of 1   Right central venous catheter with tip in the mid SVC.      Final         1. No CT evidence of pulmonary embolism.      2. Groundglass and airspace opacities in the left lingula and left lower lobe. Small areas of peripheral groundglass opacities in the right lung as well. These are concerning for multifocal pneumonia. The largest area is in the left lower lobe.      3. Postsurgical change in the left upper lobe with nodular opacities. Comparison to prior study could be helpful for interval change.          Problem and Plan:      * Septic shock (CMS-HCC)- (present on admission)   Assessment & Plan    - SIRS 3/4 on admit (left shift, HR, RR)  -   Presented with SOB, cough productive green sputum  -   Hx COPD 2/2 tobacco, lung cancer s/p lobectomy 2008  -  Not on home O2 therapy  - Lactic acid mildly elevated, now resolved  - Levophed PRN, not currently requiring  - CXR mild interstitial prominence  - CT shows scattered groundglass opacities  - Blood, urine, sputum cx pending  - Continue unasyn and azithromycin for now  - IV steroids today  - Continue gentle fluids   - Tele  - Supplemental 02  -  Echocardiogram with normal EF, pulm HTN           Acute hypoxemic respiratory failure (CMS-[facility])- (present on admission)   Assessment & Plan    - Hx COPD secondary to tobacco use, cancer s/p lobectomy  - Echo shows pulm HTN  - Now requiring supplemental 02  - Duoneb PRN  - IV abx, IV steroids, duoneb PRN  - Respiratory protocol  - Patient's code status updated to reflect the fact that she is amenable to intubation if needed, but does not want chest compressions or defibrillation          Elevated troponin- (present on admission)   Assessment & Plan    - Negative for ischemic changes in the EKG, denies chest pain   - Trop was 0.21 on admit. Trending down.  - Likely demand ischemia, treat lung pathology  - Tele  - Monitor         CAP (community acquired pneumonia)- (present on admission)   Assessment & Plan    - CT shows scattered groundglass opacities that may indicate pneumonia  - Currently being treated with IV unasyn, azithromycin  - Monitor for response to treatment  - Procalcitonin WNL, WBC count normal, afebrile         COPD with acute exacerbation (CMS-HCC)- (present on admission)   Assessment & Plan    - 55 pack year tobacco history  - Continues to use tobacco  - Hypoxic on room, not on home 02  - Supplemental 02 PRN  - Azithromycin, unasyn  - Steroids on board  - Duoneb scheduled  - IS, expectorants  - Recommend tobacco cessation         History of lung cancer- (present on admission)   Assessment & Plan    - S/p right upper lobectomy in 2008 during same surgery CABG was performed. Performed at [facility] in [facility].  - Not followed by oncology  - CT chest shows scattered opacities  - Continues to use tobacco         Hypothyroidism- (present on admission)   Assessment & Plan    - TSH WNL    - Continue home med        Diabetes mellitus type 2, controlled (CMS-[facility])- (present on admission)   Assessment & Plan    - Held home metformin  - ISS/hypoglycemic protocol inpatient and monitor glucose level  -  Last Hb A1C 6.8         Severe episode of recurrent major depressive disorder, without psychotic features (CMS-HCC)- (present on admission)   Assessment & Plan    - Continue home medications             DISPO: ICU    CODE STATUS: No chest compression, no defibrillations, ok to intubate of required    Quality Measures:  [place] Catheter: No  DVT Prophylaxis: Heparin  Ulcer Prophylaxis: Omeprazole  Antibiotics: IV unasyn, PO azithromycin (day 2)  Lines: PIV, Right IJ central line    Critical care attending note:    Patient seen, evaluated, and case discussed with medical resident. Patient seen separate from the resident as well. Events overnight reviewed at length.  I have reviewed notes from my partner  as well.    Patient yesterday initially was moving towards comfort care measures and we discussed this at length as the patient's family requested her to move to withdrawal of care.    However, the patient did receive IV steroids ordered by myself as well as diuretic and the patient improved. Though she still did not appear appropriate for oral intake, she did not require comfort care measures. Family presented to the hospital and at that point her comfort care measures were rescinded to DNR status.    I have reviewed all labs today as well as x-ray and medical treatment.    On physical exam, the patient's vital signs at the following:    Blood pressure 135/69, blood pressure between 90s to 105-110.. Saturation approximately 97%.  Pupils are equal and reactive to light. There is no icterus.  Patient has pursed lip breathing at times.  There is still significant and exhalation wheeze bilaterally.  Abdomen is mildly distended but bowel sounds are present.  Extremity showed thin dermis consistent with ongoing steroid use.  Patient is cachectic with COPD cachexia.  No peripheral edema is noted.    Impression:     1. Acute severe exacerbation of COPD.  2. Atypical bilateral pneumonia.  3. Probable metastatic lung  disease with a known history of a right upper lobe resection. Probable adenocarcinoma of the lung.  4. Protein calorie malnutrition, with COPD cachexia.  5. Near end-stage COPD with comfort measures yesterday with slight improvement and DNR/DNI status.     Plan:     1. Patient to receive additional diuresis and comfort medication for her respiratory distress. This was to include morphine and Ativan.  2. Continue with oxygen support and bronchodilator therapy including inhaled steroids, IV steroids, with diuretics given overnight.  3. Possible attempt at swallow evaluation and the patient may require a FEES study today if her pulmonary status improves  4. Continue antibiotic therapy.  5. No further workup or treatment regarding possible metastatic disease as the patient would not tolerate biopsy, chemotherapy or radiation therapy and likely has very grim prognosis in the short-term.    Critical care time so far is 35 minutes not including procedures today.    Salomon Claire D.O.

## 2018-03-23 NOTE — PROGRESS NOTES
Informed by the bedside RN that patient has been tachycardic and anxious around 1 pm this afternoon, received ativan and morphine, which had helped her calm down at that time. She became tachycardic up to 120s and hypertensive SBP up to 250s, received one dose of lasix and hydralazine with no significant effect. Has been sustaining HR > 130s. Throughout the episodes, patient has been alert, oriented x 4, denies chest pain, headache, blurry vision, however has been dyspneic.  discussed with the family about patient's current condition, explaining the poor prognosis given her end-stage COPD, family at bedside has decided to make the patient comfort care. Orders placed accordingly for COMFORT CARE MEASURES.

## 2018-03-23 NOTE — CARE PLAN
Problem: Communication  Goal: The ability to communicate needs accurately and effectively will improve  Outcome: PROGRESSING AS EXPECTED  Educating patient and family about plan of care, procedures, treatments, and medications.  All questions asked    Problem: Safety  Goal: Will remain free from injury  Outcome: PROGRESSING AS EXPECTED  Providing assistance with mobility

## 2018-03-23 NOTE — PROGRESS NOTES
RN spoke with on-call Intensivist Dr. Goldman regarding patient family request. Per MD, will send on-call [facility] resident to bedside for assessment and family. No new orders at this time.

## 2018-03-24 LAB
ALBUMIN SERPL BCP-MCNC: 3 G/DL (ref 3.2–4.9)
ALBUMIN/GLOB SERPL: 1.1 G/DL
ALP SERPL-CCNC: 101 U/L (ref 30–99)
ALT SERPL-CCNC: 23 U/L (ref 2–50)
ANION GAP SERPL CALC-SCNC: 10 MMOL/L (ref 0–11.9)
ANION GAP SERPL CALC-SCNC: 12 MMOL/L (ref 0–11.9)
AST SERPL-CCNC: 32 U/L (ref 12–45)
BASOPHILS # BLD AUTO: 0.2 % (ref 0–1.8)
BASOPHILS # BLD: 0.03 K/UL (ref 0–0.12)
BILIRUB SERPL-MCNC: 0.4 MG/DL (ref 0.1–1.5)
BUN SERPL-MCNC: 16 MG/DL (ref 8–22)
BUN SERPL-MCNC: 20 MG/DL (ref 8–22)
CALCIUM SERPL-MCNC: 7.4 MG/DL (ref 8.5–10.5)
CALCIUM SERPL-MCNC: 8.6 MG/DL (ref 8.5–10.5)
CHLORIDE SERPL-SCNC: 89 MMOL/L (ref 96–112)
CHLORIDE SERPL-SCNC: 95 MMOL/L (ref 96–112)
CO2 SERPL-SCNC: 28 MMOL/L (ref 20–33)
CO2 SERPL-SCNC: 38 MMOL/L (ref 20–33)
CREAT SERPL-MCNC: 0.53 MG/DL (ref 0.5–1.4)
CREAT SERPL-MCNC: 0.8 MG/DL (ref 0.5–1.4)
EOSINOPHIL # BLD AUTO: 0 K/UL (ref 0–0.51)
EOSINOPHIL NFR BLD: 0 % (ref 0–6.9)
ERYTHROCYTE [DISTWIDTH] IN BLOOD BY AUTOMATED COUNT: 43.4 FL (ref 35.9–50)
GLOBULIN SER CALC-MCNC: 2.8 G/DL (ref 1.9–3.5)
GLUCOSE BLD-MCNC: 152 MG/DL (ref 65–99)
GLUCOSE BLD-MCNC: 154 MG/DL (ref 65–99)
GLUCOSE BLD-MCNC: 179 MG/DL (ref 65–99)
GLUCOSE BLD-MCNC: 194 MG/DL (ref 65–99)
GLUCOSE SERPL-MCNC: 172 MG/DL (ref 65–99)
GLUCOSE SERPL-MCNC: 179 MG/DL (ref 65–99)
HCT VFR BLD AUTO: 38.5 % (ref 37–47)
HGB BLD-MCNC: 12.7 G/DL (ref 12–16)
IMM GRANULOCYTES # BLD AUTO: 0.4 K/UL (ref 0–0.11)
IMM GRANULOCYTES NFR BLD AUTO: 2.8 % (ref 0–0.9)
LYMPHOCYTES # BLD AUTO: 1.2 K/UL (ref 1–4.8)
LYMPHOCYTES NFR BLD: 8.4 % (ref 22–41)
MAGNESIUM SERPL-MCNC: 1.4 MG/DL (ref 1.5–2.5)
MCH RBC QN AUTO: 30.5 PG (ref 27–33)
MCHC RBC AUTO-ENTMCNC: 33 G/DL (ref 33.6–35)
MCV RBC AUTO: 92.3 FL (ref 81.4–97.8)
MONOCYTES # BLD AUTO: 0.72 K/UL (ref 0–0.85)
MONOCYTES NFR BLD AUTO: 5 % (ref 0–13.4)
NEUTROPHILS # BLD AUTO: 11.99 K/UL (ref 2–7.15)
NEUTROPHILS NFR BLD: 83.6 % (ref 44–72)
NRBC # BLD AUTO: 0 K/UL
NRBC BLD-RTO: 0 /100 WBC
PHOSPHATE SERPL-MCNC: 2.7 MG/DL (ref 2.5–4.5)
PLATELET # BLD AUTO: 334 K/UL (ref 164–446)
PMV BLD AUTO: 10.2 FL (ref 9–12.9)
POTASSIUM SERPL-SCNC: 2.7 MMOL/L (ref 3.6–5.5)
POTASSIUM SERPL-SCNC: 3.2 MMOL/L (ref 3.6–5.5)
PROT SERPL-MCNC: 5.8 G/DL (ref 6–8.2)
RBC # BLD AUTO: 4.17 M/UL (ref 4.2–5.4)
SODIUM SERPL-SCNC: 135 MMOL/L (ref 135–145)
SODIUM SERPL-SCNC: 137 MMOL/L (ref 135–145)
WBC # BLD AUTO: 14.3 K/UL (ref 4.8–10.8)

## 2018-03-24 PROCEDURE — 80053 COMPREHEN METABOLIC PANEL: CPT

## 2018-03-24 PROCEDURE — 82962 GLUCOSE BLOOD TEST: CPT | Mod: 91

## 2018-03-24 PROCEDURE — 85025 COMPLETE CBC W/AUTO DIFF WBC: CPT

## 2018-03-24 PROCEDURE — A9270 NON-COVERED ITEM OR SERVICE: HCPCS | Performed by: INTERNAL MEDICINE

## 2018-03-24 PROCEDURE — 80048 BASIC METABOLIC PNL TOTAL CA: CPT

## 2018-03-24 PROCEDURE — 84100 ASSAY OF PHOSPHORUS: CPT

## 2018-03-24 PROCEDURE — 92526 ORAL FUNCTION THERAPY: CPT

## 2018-03-24 PROCEDURE — 700111 HCHG RX REV CODE 636 W/ 250 OVERRIDE (IP): Performed by: STUDENT IN AN ORGANIZED HEALTH CARE EDUCATION/TRAINING PROGRAM

## 2018-03-24 PROCEDURE — 94640 AIRWAY INHALATION TREATMENT: CPT

## 2018-03-24 PROCEDURE — 700111 HCHG RX REV CODE 636 W/ 250 OVERRIDE (IP): Performed by: INTERNAL MEDICINE

## 2018-03-24 PROCEDURE — 83735 ASSAY OF MAGNESIUM: CPT

## 2018-03-24 PROCEDURE — 700105 HCHG RX REV CODE 258: Performed by: INTERNAL MEDICINE

## 2018-03-24 PROCEDURE — 700102 HCHG RX REV CODE 250 W/ 637 OVERRIDE(OP): Performed by: INTERNAL MEDICINE

## 2018-03-24 PROCEDURE — 700101 HCHG RX REV CODE 250: Performed by: STUDENT IN AN ORGANIZED HEALTH CARE EDUCATION/TRAINING PROGRAM

## 2018-03-24 PROCEDURE — 700102 HCHG RX REV CODE 250 W/ 637 OVERRIDE(OP): Performed by: STUDENT IN AN ORGANIZED HEALTH CARE EDUCATION/TRAINING PROGRAM

## 2018-03-24 PROCEDURE — 770022 HCHG ROOM/CARE - ICU (200)

## 2018-03-24 PROCEDURE — 700101 HCHG RX REV CODE 250: Performed by: INTERNAL MEDICINE

## 2018-03-24 PROCEDURE — A9270 NON-COVERED ITEM OR SERVICE: HCPCS | Performed by: STUDENT IN AN ORGANIZED HEALTH CARE EDUCATION/TRAINING PROGRAM

## 2018-03-24 PROCEDURE — 700111 HCHG RX REV CODE 636 W/ 250 OVERRIDE (IP)

## 2018-03-24 RX ORDER — IPRATROPIUM BROMIDE AND ALBUTEROL SULFATE 2.5; .5 MG/3ML; MG/3ML
3 SOLUTION RESPIRATORY (INHALATION)
Status: DISCONTINUED | OUTPATIENT
Start: 2018-03-25 | End: 2018-03-26

## 2018-03-24 RX ORDER — MAGNESIUM SULFATE HEPTAHYDRATE 40 MG/ML
2 INJECTION, SOLUTION INTRAVENOUS ONCE
Status: COMPLETED | OUTPATIENT
Start: 2018-03-24 | End: 2018-03-24

## 2018-03-24 RX ORDER — ONDANSETRON 2 MG/ML
INJECTION INTRAMUSCULAR; INTRAVENOUS
Status: COMPLETED
Start: 2018-03-24 | End: 2018-03-24

## 2018-03-24 RX ORDER — LABETALOL HYDROCHLORIDE 5 MG/ML
10 INJECTION, SOLUTION INTRAVENOUS EVERY 6 HOURS PRN
Status: DISCONTINUED | OUTPATIENT
Start: 2018-03-24 | End: 2018-03-29 | Stop reason: HOSPADM

## 2018-03-24 RX ORDER — ALPRAZOLAM 0.25 MG/1
0.5 TABLET ORAL ONCE
Status: COMPLETED | OUTPATIENT
Start: 2018-03-24 | End: 2018-03-24

## 2018-03-24 RX ORDER — AMLODIPINE BESYLATE 5 MG/1
5 TABLET ORAL
Status: DISCONTINUED | OUTPATIENT
Start: 2018-03-24 | End: 2018-03-27

## 2018-03-24 RX ORDER — POTASSIUM CHLORIDE 20 MEQ/1
40 TABLET, EXTENDED RELEASE ORAL ONCE
Status: COMPLETED | OUTPATIENT
Start: 2018-03-24 | End: 2018-03-24

## 2018-03-24 RX ORDER — POTASSIUM CHLORIDE 20 MEQ/1
20 TABLET, EXTENDED RELEASE ORAL ONCE
Status: DISPENSED | OUTPATIENT
Start: 2018-03-24 | End: 2018-03-25

## 2018-03-24 RX ORDER — FUROSEMIDE 10 MG/ML
40 INJECTION INTRAMUSCULAR; INTRAVENOUS ONCE
Status: COMPLETED | OUTPATIENT
Start: 2018-03-24 | End: 2018-03-24

## 2018-03-24 RX ORDER — POTASSIUM CHLORIDE 29.8 MG/ML
40 INJECTION INTRAVENOUS ONCE
Status: COMPLETED | OUTPATIENT
Start: 2018-03-24 | End: 2018-03-24

## 2018-03-24 RX ORDER — METHYLPREDNISOLONE SODIUM SUCCINATE 40 MG/ML
40 INJECTION, POWDER, LYOPHILIZED, FOR SOLUTION INTRAMUSCULAR; INTRAVENOUS EVERY 8 HOURS
Status: DISCONTINUED | OUTPATIENT
Start: 2018-03-24 | End: 2018-03-27

## 2018-03-24 RX ORDER — ONDANSETRON 2 MG/ML
4 INJECTION INTRAMUSCULAR; INTRAVENOUS EVERY 4 HOURS PRN
Status: DISCONTINUED | OUTPATIENT
Start: 2018-03-24 | End: 2018-03-29 | Stop reason: HOSPADM

## 2018-03-24 RX ADMIN — AMPICILLIN SODIUM AND SULBACTAM SODIUM 3 G: 2; 1 INJECTION, POWDER, FOR SOLUTION INTRAMUSCULAR; INTRAVENOUS at 12:16

## 2018-03-24 RX ADMIN — AMLODIPINE BESYLATE 5 MG: 5 TABLET ORAL at 05:47

## 2018-03-24 RX ADMIN — ALPRAZOLAM 0.5 MG: 0.25 TABLET ORAL at 06:20

## 2018-03-24 RX ADMIN — INSULIN HUMAN 2 UNITS: 100 INJECTION, SOLUTION PARENTERAL at 18:05

## 2018-03-24 RX ADMIN — IPRATROPIUM BROMIDE AND ALBUTEROL SULFATE 3 ML: .5; 3 SOLUTION RESPIRATORY (INHALATION) at 11:39

## 2018-03-24 RX ADMIN — MAGNESIUM SULFATE IN WATER 2 G: 40 INJECTION, SOLUTION INTRAVENOUS at 11:23

## 2018-03-24 RX ADMIN — ONDANSETRON 4 MG: 2 INJECTION INTRAMUSCULAR; INTRAVENOUS at 07:53

## 2018-03-24 RX ADMIN — IPRATROPIUM BROMIDE AND ALBUTEROL SULFATE 3 ML: .5; 3 SOLUTION RESPIRATORY (INHALATION) at 03:45

## 2018-03-24 RX ADMIN — METHYLPREDNISOLONE SODIUM SUCCINATE 40 MG: 40 INJECTION, POWDER, FOR SOLUTION INTRAMUSCULAR; INTRAVENOUS at 14:43

## 2018-03-24 RX ADMIN — NICOTINE 7 MG: 7 PATCH, EXTENDED RELEASE TRANSDERMAL at 05:47

## 2018-03-24 RX ADMIN — TIOTROPIUM BROMIDE 1 CAPSULE: 18 CAPSULE ORAL; RESPIRATORY (INHALATION) at 06:45

## 2018-03-24 RX ADMIN — INSULIN HUMAN 2 UNITS: 100 INJECTION, SOLUTION PARENTERAL at 12:16

## 2018-03-24 RX ADMIN — OXYCODONE HYDROCHLORIDE AND ACETAMINOPHEN 1 TABLET: 5; 325 TABLET ORAL at 21:14

## 2018-03-24 RX ADMIN — BUDESONIDE 0.5 MG: 0.5 SUSPENSION RESPIRATORY (INHALATION) at 18:39

## 2018-03-24 RX ADMIN — IPRATROPIUM BROMIDE AND ALBUTEROL SULFATE 3 ML: .5; 3 SOLUTION RESPIRATORY (INHALATION) at 14:44

## 2018-03-24 RX ADMIN — OXYCODONE HYDROCHLORIDE AND ACETAMINOPHEN 1 TABLET: 5; 325 TABLET ORAL at 16:19

## 2018-03-24 RX ADMIN — IPRATROPIUM BROMIDE AND ALBUTEROL SULFATE 3 ML: .5; 3 SOLUTION RESPIRATORY (INHALATION) at 18:39

## 2018-03-24 RX ADMIN — POTASSIUM CHLORIDE 40 MEQ: 1500 TABLET, EXTENDED RELEASE ORAL at 21:13

## 2018-03-24 RX ADMIN — LABETALOL HYDROCHLORIDE 10 MG: 5 INJECTION, SOLUTION INTRAVENOUS at 06:20

## 2018-03-24 RX ADMIN — BUDESONIDE 0.5 MG: 0.5 SUSPENSION RESPIRATORY (INHALATION) at 06:45

## 2018-03-24 RX ADMIN — OXYCODONE HYDROCHLORIDE AND ACETAMINOPHEN 1 TABLET: 5; 325 TABLET ORAL at 05:47

## 2018-03-24 RX ADMIN — INSULIN HUMAN 2 UNITS: 100 INJECTION, SOLUTION PARENTERAL at 05:56

## 2018-03-24 RX ADMIN — ANTACID TABLETS 500 MG: 500 TABLET, CHEWABLE ORAL at 05:47

## 2018-03-24 RX ADMIN — HYDRALAZINE HYDROCHLORIDE 20 MG: 20 INJECTION INTRAMUSCULAR; INTRAVENOUS at 03:22

## 2018-03-24 RX ADMIN — POTASSIUM CHLORIDE 40 MEQ: 29.8 INJECTION, SOLUTION INTRAVENOUS at 07:53

## 2018-03-24 RX ADMIN — FUROSEMIDE 40 MG: 10 INJECTION, SOLUTION INTRAMUSCULAR; INTRAVENOUS at 12:16

## 2018-03-24 RX ADMIN — AMPICILLIN SODIUM AND SULBACTAM SODIUM 3 G: 2; 1 INJECTION, POWDER, FOR SOLUTION INTRAMUSCULAR; INTRAVENOUS at 05:47

## 2018-03-24 RX ADMIN — INSULIN HUMAN 2 UNITS: 100 INJECTION, SOLUTION PARENTERAL at 21:20

## 2018-03-24 RX ADMIN — IPRATROPIUM BROMIDE AND ALBUTEROL SULFATE 3 ML: .5; 3 SOLUTION RESPIRATORY (INHALATION) at 22:33

## 2018-03-24 RX ADMIN — METHYLPREDNISOLONE SODIUM SUCCINATE 62.5 MG: 125 INJECTION, POWDER, FOR SOLUTION INTRAMUSCULAR; INTRAVENOUS at 05:48

## 2018-03-24 RX ADMIN — QUETIAPINE FUMARATE 25 MG: 25 TABLET ORAL at 21:13

## 2018-03-24 RX ADMIN — IPRATROPIUM BROMIDE AND ALBUTEROL SULFATE 3 ML: .5; 3 SOLUTION RESPIRATORY (INHALATION) at 06:44

## 2018-03-24 RX ADMIN — METHYLPREDNISOLONE SODIUM SUCCINATE 40 MG: 40 INJECTION, POWDER, FOR SOLUTION INTRAMUSCULAR; INTRAVENOUS at 21:14

## 2018-03-24 ASSESSMENT — PAIN SCALES - GENERAL
PAINLEVEL_OUTOF10: 4
PAINLEVEL_OUTOF10: 7
PAINLEVEL_OUTOF10: 0
PAINLEVEL_OUTOF10: 2
PAINLEVEL_OUTOF10: 4
PAINLEVEL_OUTOF10: 7
PAINLEVEL_OUTOF10: 0
PAINLEVEL_OUTOF10: 6
PAINLEVEL_OUTOF10: 0

## 2018-03-24 ASSESSMENT — ENCOUNTER SYMPTOMS
CHILLS: 0
WHEEZING: 1
SPUTUM PRODUCTION: 1
BACK PAIN: 1
FEVER: 0
FALLS: 0
SPEECH CHANGE: 0
COUGH: 1
HEMOPTYSIS: 0
FOCAL WEAKNESS: 0
VOMITING: 0
NAUSEA: 0
HEADACHES: 0
LOSS OF CONSCIOUSNESS: 0
SHORTNESS OF BREATH: 1
PALPITATIONS: 0

## 2018-03-24 ASSESSMENT — PATIENT HEALTH QUESTIONNAIRE - PHQ9
1. LITTLE INTEREST OR PLEASURE IN DOING THINGS: NOT AT ALL
SUM OF ALL RESPONSES TO PHQ9 QUESTIONS 1 AND 2: 0
2. FEELING DOWN, DEPRESSED, IRRITABLE, OR HOPELESS: NOT AT ALL

## 2018-03-24 ASSESSMENT — COPD QUESTIONNAIRES
DURING THE PAST 4 WEEKS HOW MUCH DID YOU FEEL SHORT OF BREATH: NONE/LITTLE OF THE TIME
COPD SCREENING SCORE: 6
HAVE YOU SMOKED AT LEAST 100 CIGARETTES IN YOUR ENTIRE LIFE: YES
DO YOU EVER COUGH UP ANY MUCUS OR PHLEGM?: NO/ONLY WITH OCCASIONAL COLDS OR INFECTIONS

## 2018-03-24 ASSESSMENT — LIFESTYLE VARIABLES
EVER_SMOKED: YES
DO YOU DRINK ALCOHOL: NO

## 2018-03-24 NOTE — CARE PLAN
Problem: Oxygenation:  Goal: Maintain adequate oxygenation dependent on patient condition  Outcome: PROGRESSING AS EXPECTED  Pt receiving tx's and is tolerating them well

## 2018-03-24 NOTE — PROGRESS NOTES
RN spoke with on-call [facility] Resident Dr. Tello regarding patient HTN and Anxiety. New orders received.

## 2018-03-24 NOTE — THERAPY
"Speech Language Therapy dysphagia treatment completed.   Functional Status:  Pt with difficulty with oral intake last HS and with emesis. Pt stating that food and liquid getting stuck and pointing to left side of mid-throat. Pt eager tp begin eating with pt and her dgtr stating pt needs to eat to improve. Pt given 3/4 cup of pudding, approx 1/4 cup of thin water from the cup, sip of nectar thick juice, and 3 tsps of applesauce. Pt states that the NTL juice felt \"grainy\" but with no difficulty or sensation of the puree or thin liquid getting \"caught\" during the swallow reassessment. No coughing post swallow with clear voice noted. No delay in swallow. Per nurs and pt, pt with possible newly dx lung CA. SLP provided educ to pt, her family, nurs and MD regarding recommendations for full thin liquid ADA texture as tolerated at this time. Hold intake with difficulty.   Recommendations: Full thin liquids ADA  Plan of Care: Will benefit from Speech Therapy 3 times per week  Post-Acute Therapy: Discharge to a transitional care facility for continued skilled therapy services.Thanks, Chris    See \"Rehab Therapy-Acute\" Patient Summary Report for complete documentation.     "

## 2018-03-24 NOTE — RESPIRATORY CARE
COPD EDUCATION by COPD CLINICAL EDUCATOR  3/24/2018 at 3:32 PM by Mari Gonzalez     Patient reviewed by COPD education team. Patient does not qualify for COPD program.

## 2018-03-24 NOTE — CONSULTS
"Reason for PC Consult: Advance Care Planning    Consulted by: Dr. Claire, [facility]    Assessment:  General: 78 y/o female admitted with worsening SOB with O2 sats in 70s. PMHx of lung CA s/p lobectomy, non-O2 dependent COPD, CABG, anxiety, HTN, DM, PAD, arthritis, current smoker. Pt made decision for CC 3/22 then decided she was \"not ready to die\" 3/23 and made DNR. PC consulted to assist with her goals    Consults: N/A    Dyspnea: Yes- on O2 at 3LPM  Last BM: 03/21/18-    Pain: No-    Depression: Mood appropriate for situation-    Dementia: No;       Spiritual:  Is Tenriism or spirituality important for coping with this illness? No-    Has a  or spiritual provider visit been requested? No    Palliative Performance Scale: 40%    Advance Directive: None-    DPOA: No-    POLST: No-      Code Status: DNR-      Outcome:  PC RN met with Mia, her daughter Eliane and friends at bedside and explained the role of PC. PC RN expressed the desire to sit with she and her family to discuss her goal, options and POC going forward. She was appreciative of that and Eliane suggested talking with her brother Raymundo to arrange a time. PC RN spoke with Praveen and the plan was made for tomorrow at 1000. BS RN aware and this RN appreciates the updates she provided on this patient's case. Pt awaiting SLP evaluation as well today which will be important to have when discussing POC tomorrow.    While talking to Mia and her family, PC RN provided active listening, normalization, validation of thoughts and feelings, as well as reinforced her goals of care. PC contact information provided to Mia and encouraged to call with any questions or concerns.     Updated: BS RN    Plan: meet with pt/family Sunday at 1000    Recommendations: I do not recommend an ethics or hospice consult at this time because family meeting set for tomorrow.    Thank you for allowing Palliative Care to participate in this patient's care. Please feel free to call [phone] with any questions or concerns.

## 2018-03-24 NOTE — PROGRESS NOTES
Hypertension          Started on amlodipine 5 mg           Restarted home losartan 50 mg          Started furosemide 20 mg tab with one extra dose of lasix IV 20 mg ... 20 meq K supplement           Hydralazine 20 mg Q6 H PRN.... No BB due to breathing issues      Anxiety           Patient keep ask for more oxygen....... increased overnight for comfort           Treated with alprazolam 0.25 mg tab (patient is taking it at home and caused no respiratory suppression, anxiety improve after 0.25 mg tab and patient ask for larger dose)          Started Seroquel 25 mg to help with anxiety/agitation

## 2018-03-24 NOTE — PROGRESS NOTES
Colby from Lab called with critical result of K=2.7 at 0725. Critical lab result read back to Colby.   Dr. Claire notified of critical lab result at 0735.  Critical lab result read back by Dr. Claire.

## 2018-03-24 NOTE — PROGRESS NOTES
Patient SBP 200s. HR 120s. Patient with no prns for HTN, patient remains hypertensive despite PO Losartan. RN paged on-call [facility] for orders.Awaiting call back.

## 2018-03-24 NOTE — PROGRESS NOTES
[facility] ICU Progress Note      Admit Date: 3/20/2018  ICU Day: 5    Resident(s): Tomer Schultz  Attending: JULIEN FELDMAN/ Dr. Claire    Date & Time:   3/24/2018   2:55 PM       Patient ID:    Name:             Mia Hammond   YOB: 1940  Age:                 77 y.o.  female   MRN:               [id number]    ID:  77 year old female with a PMH of current tobacco use, COPD not on home 02 lung cancer s/p lobectomy, coronary artery disease s/p CABG presented to the ED on 3/20/2018 with complaints of SOB, cough productive of green sputum. Patient was seen at urgent care 3 days prior to admit and placed on azithromycin which did not relieve the patient's symptoms. Her symptoms continued to worsen and returned to urgent care. She was found to be hypoxic on room air Sp02 in 70's and was sent to the ED here at [facility]. Patient was found to be hypotensive in the ED and after 1.6L IV fluid bolus per sepsis protocol her blood pressure remained low. At that time a central line in right IJ was placed for pressor therapy which she only required briefly in the ICU. Oxygen requirement's increased from 4 L nasal cannula to 4 L oxymask and lung auscultation revealed diffuse scattered wheezing. She was initiated on IV unasyn and azithromycin and started on IV steroids. Patient was placed in ICU for pressors and close observation.    Consultants:  PMA: Dr. Claire    Interval Events:  - Aox4, complains of some chest pain  - currently NPO , awaiting speech evaluation as she choked and vomited this morning.  - coarse breath sounds on exam, will continue IV and PO steroids ( pending swallow eval)  - continue diuresis with IV lasix 40 mg BID. Last day of antibiotics today.  - recheck electrolytes and replace as needed.  - Will consult palliative care for goals of care planning.    Review of Systems   Constitutional: Positive for malaise/fatigue. Negative for chills and fever.   Respiratory: Positive for cough,  "sputum production, shortness of breath and wheezing. Negative for hemoptysis.    Cardiovascular: Negative for chest pain, palpitations and leg swelling.   Gastrointestinal: Negative for nausea and vomiting.   Musculoskeletal: Positive for back pain. Negative for falls.   Neurological: Negative for speech change, focal weakness, loss of consciousness and headaches.       PHYSICAL EXAM  Vitals:    03/24/18 1000 03/24/18 1100 03/24/18 1200 03/24/18 1300   BP:       Pulse: (!) 108 (!) 109 (!) 109 (!) 108   Resp: (!) 23 (!) 22 (!) 24 (!) 29   Temp: 37.2 °C (99 °F)  36.8 °C (98.2 °F)    SpO2: 93% 96% 97% 91%   Weight:       Height:         Body mass index is 23.24 kg/m².  BP (!) 69/50   Pulse (!) 108   Temp 36.8 °C (98.2 °F)   Resp (!) 29   Ht 1.5 m (4' 11.06\")   Wt 52.3 kg (115 lb 4.8 oz)   SpO2 91%   Breastfeeding? No   BMI 23.24 kg/m²    O2 therapy: Pulse Oximetry: 91 %, O2 (LPM): 3, O2 Delivery: Silicone Nasal Cannula    Physical Exam   Constitutional: She is oriented to person, place, and time.   Ill-appearing   HENT:   Head: Normocephalic and atraumatic.   Eyes: Pupils are equal, round, and reactive to light.   Cardiovascular: Regular rhythm.    tachycardic   Pulmonary/Chest: She has wheezes. She has rales.   Coarse breath sounds through out the lungs.   Abdominal: Soft. She exhibits no distension. There is no tenderness.   Musculoskeletal: She exhibits no tenderness.   Neurological: She is oriented to person, place, and time.   Skin: Skin is warm.           Respiratory:     Respiration: (!) 29, Pulse Oximetry: 91 %, O2 Daily Delivery Respiratory : Nasal Cannula    Chest Tube Drains:          Invalid input(s): [id number]    HemoDynamics:  Pulse: (!) 108, Heart Rate (Monitored): (!) 108 NIBP: 124/58        Fluids:    Date 03/24/18 0700 - 03/25/18 0659   Shift 5361-6614 2735-2950 3303-2128 24 Hour Total   I  N  T  A  K  E   I.V. 260   260      IV Volume (IVPB) 250   250      IV Volume (NS) 10   10    Shift " Total 260   260   O  U  T  P  U  T   Urine 460   460      Indwelling Cathether 460   460    Stool          Number of Times Stooled 0 x   0 x    Shift Total 460   460   NET -200   -200        Intake/Output Summary (Last 24 hours) at 18 1446  Last data filed at 18 1200   Gross per 24 hour   Intake              480 ml   Output              900 ml   Net             -420 ml          Body mass index is 23.24 kg/m².    Recent Labs      18   0610  03/23/18   0115  03/23/18   0610  03/24/18   06   SODIUM  138  137  140  135   POTASSIUM  3.4*  2.9*  3.4*  2.7*   CHLORIDE  105  97  98  95*   CO2  25  30  29  28   BUN  12  13  12  16   CREATININE  0.66  0.66  0.51  0.53   MAGNESIUM  1.6  1.3*   --   1.4*   PHOSPHORUS  2.6  3.5   --   2.7   CALCIUM  8.1*  8.1*  8.4*  7.4*       GI/Nutrition:  Recent Labs      18   06   ALTSGPT  21    23   ASTSGOT  52*  60*  32   ALKPHOSPHAT  103*  88  101*   TBILIRUBIN  0.3  0.3  0.4   GLUCOSE  158*  164*  179*       Heme:  Recent Labs      18   06   RBC  3.84*  3.76*  4.17*   HEMOGLOBIN  12.1  11.7*  12.7   HEMATOCRIT  36.5*  35.4*  38.5   PLATELETCT  275  312  334       Infectious Disease:  Temp  Av °C (98.6 °F)  Min: 36.7 °C (98 °F)  Max: 37.3 °C (99.1 °F)  Recent Labs      18   0610  03/23/18   0115  03/23/18   0610  03/24/18   0600   WBC  13.8*  15.0*   --   14.3*   NEUTSPOLYS  90.40*  86.30*   --   83.60*   LYMPHOCYTES  5.20*  9.40*   --   8.40*   MONOCYTES  4.40  3.40   --   5.00   EOSINOPHILS  0.00  0.00   --   0.00   BASOPHILS  0.00  0.00   --   0.20   ASTSGOT  39  52*  60*  32   ALTSGPT  20  21  21  23   ALKPHOSPHAT  109*  103*  88  101*   TBILIRUBIN  0.3  0.3  0.3  0.4       Meds:  • amLODIPine  5 mg     • labetalol  10 mg     • potassium chloride SA  20 mEq     • ondansetron  4 mg     • methylPREDNISolone  40 mg     • insulin regular  2-9 Units      And   • glucose 4 g   16 g      And   • dextrose 50%  25 mL     • ipratropium-albuterol  3 mL     • ipratropium-albuterol  3 mL     • budesonide  0.5 mg     • Respiratory Care per Protocol       • tiotropium  1 Cap     • nicotine  7 mg     • QUEtiapine  25 mg     • furosemide  20 mg     • losartan  50 mg     • oxyCODONE-acetaminophen  1 Tab     • potassium chloride SA  20 mEq     • hydrALAZINE  20 mg     • calcium carbonate  500 mg     • lidocaine viscous 2%  5 mL     • glycopyrrolate  1 mg          Procedures:  Right IJ central catheter 3/21    Imaging:  DX-CHEST-PORTABLE (1 VIEW)   Final Result      Findings consistent with pulmonary edema, improved compared with 3/22.      DX-CHEST-PORTABLE (1 VIEW)   Final Result      Moderate diffuse interstitial opacities increased when compared to previous and consistent with edema or pneumonitis. No pneumothorax.   Ill-defined opacities left lung apex appears similar to recent findings. Continue follow-up.      ECHOCARDIOGRAM COMP W/O CONT   Final Result      CT-CTA CHEST PULMONARY ARTERY W/ RECONS   Final Result   Addendum 1 of 1   Right central venous catheter with tip in the mid SVC.      Final         1. No CT evidence of pulmonary embolism.      2. Groundglass and airspace opacities in the left lingula and left lower lobe. Small areas of peripheral groundglass opacities in the right lung as well. These are concerning for multifocal pneumonia. The largest area is in the left lower lobe.      3. Postsurgical change in the left upper lobe with nodular opacities. Comparison to prior study could be helpful for interval change.          Problem and Plan:      * Septic shock (CMS-HCC)- (present on admission)   Assessment & Plan    - SIRS 3/4 on admit (left shift, HR, RR)  -   Presented with SOB, cough productive green sputum  -   Hx COPD 2/2 tobacco, lung cancer s/p lobectomy 2008  -  Not on home O2 therapy  - Lactic acid mildly elevated, now resolved  - CXR pulm edema  - CT shows scattered  groundglass opacities  - Blood, urine cx negative  - Continue unasyn  - IV steroids  - Tele  - Supplemental 02  - Echocardiogram with normal EF, pulm HTN         Acute hypoxemic respiratory failure (CMS-HCC)- (present on admission)   Assessment & Plan    - Hx COPD secondary to tobacco use, cancer s/p lobectomy  - Echo shows pulm HTN  - Now requiring supplemental 02  - Duoneb PRN  - IV abx, IV steroids, duoneb  - Respiratory protocol         Elevated troponin- (present on admission)   Assessment & Plan    - Negative for ischemic changes in the EKG, denies chest pain   - Trop was 0.21 on admit. Trending down.  - Likely demand ischemia, treat lung pathology  - Tele  - Monitor          CAP (community acquired pneumonia)- (present on admission)   Assessment & Plan    - CT shows scattered groundglass opacities that may indicate pneumonia  - Currently being treated with IV unasyn, last day 3/24          COPD with acute exacerbation (CMS-[facility])- (present on admission)   Assessment & Plan    - 55 pack year tobacco history  - Continues to use tobacco  - Hypoxic on room, not on home 02  - Supplemental 02 PRN  - Unasyn  - Steroids on board  - Duoneb scheduled  - IS, expectorants  - Recommend tobacco cessation         History of lung cancer- (present on admission)   Assessment & Plan    - S/p right upper lobectomy in 2008 during same surgery CABG was performed. Performed at [facility] in [facility].  - Not followed by oncology  - CT chest shows scattered opacities  - Continues to use tobacco          Dysphagia   Assessment & Plan    - Was on dysphagia 2 yesterday after FEES, made NPO overnight due to clinical worsening  - Waiting for Speech to re-evaluated before starting diet/PO meds         Electrolyte abnormality   Assessment & Plan    - monitor and replete K and Mg, IV until Speech approves PO meds         Hypothyroidism- (present on admission)   Assessment & Plan    - TSH WNL    - Continue home med         Diabetes  mellitus type 2, controlled (CMS-HCC)- (present on admission)   Assessment & Plan    - Held home metformin  - ISS/hypoglycemic protocol inpatient and monitor glucose level  - Last Hb A1C 6.8          Severe episode of recurrent major depressive disorder, without psychotic features (CMS-HCC)- (present on admission)   Assessment & Plan    - Continue home medications              DISPO: ICU    CODE STATUS: No chest compression, no defibrillations, ok to intubate of required    Quality Measures:  [place] Catheter: No  DVT Prophylaxis: Heparin  Ulcer Prophylaxis: Omeprazole  Antibiotics: IV unasyn, PO azithromycin - completing today  Lines: PIV, Right IJ central line

## 2018-03-24 NOTE — PROGRESS NOTES
RN paged on-call [facility] for updates on patient HTN SBP 210s. HR 120s. Patient anxious requesting Xanax. Patient SOB. RN paged for orders. No prns avail for HTN. Awaitig call back.

## 2018-03-25 PROBLEM — R79.89 ELEVATED TROPONIN: Status: RESOLVED | Noted: 2018-03-21 | Resolved: 2018-03-25

## 2018-03-25 LAB
ALBUMIN SERPL BCP-MCNC: 3 G/DL (ref 3.2–4.9)
ALBUMIN/GLOB SERPL: 1.1 G/DL
ALP SERPL-CCNC: 90 U/L (ref 30–99)
ALT SERPL-CCNC: 18 U/L (ref 2–50)
ANION GAP SERPL CALC-SCNC: 9 MMOL/L (ref 0–11.9)
AST SERPL-CCNC: 17 U/L (ref 12–45)
BACTERIA BLD CULT: NORMAL
BACTERIA BLD CULT: NORMAL
BASOPHILS # BLD AUTO: 0.3 % (ref 0–1.8)
BASOPHILS # BLD: 0.04 K/UL (ref 0–0.12)
BILIRUB SERPL-MCNC: 0.5 MG/DL (ref 0.1–1.5)
BUN SERPL-MCNC: 23 MG/DL (ref 8–22)
CALCIUM SERPL-MCNC: 8.6 MG/DL (ref 8.5–10.5)
CHLORIDE SERPL-SCNC: 88 MMOL/L (ref 96–112)
CO2 SERPL-SCNC: 36 MMOL/L (ref 20–33)
CREAT SERPL-MCNC: 0.69 MG/DL (ref 0.5–1.4)
EOSINOPHIL # BLD AUTO: 0 K/UL (ref 0–0.51)
EOSINOPHIL NFR BLD: 0 % (ref 0–6.9)
ERYTHROCYTE [DISTWIDTH] IN BLOOD BY AUTOMATED COUNT: 43.2 FL (ref 35.9–50)
GLOBULIN SER CALC-MCNC: 2.8 G/DL (ref 1.9–3.5)
GLUCOSE BLD-MCNC: 191 MG/DL (ref 65–99)
GLUCOSE BLD-MCNC: 213 MG/DL (ref 65–99)
GLUCOSE BLD-MCNC: 233 MG/DL (ref 65–99)
GLUCOSE BLD-MCNC: 246 MG/DL (ref 65–99)
GLUCOSE SERPL-MCNC: 234 MG/DL (ref 65–99)
HCT VFR BLD AUTO: 40.3 % (ref 37–47)
HGB BLD-MCNC: 13.7 G/DL (ref 12–16)
IMM GRANULOCYTES # BLD AUTO: 0.22 K/UL (ref 0–0.11)
IMM GRANULOCYTES NFR BLD AUTO: 1.5 % (ref 0–0.9)
LYMPHOCYTES # BLD AUTO: 0.9 K/UL (ref 1–4.8)
LYMPHOCYTES NFR BLD: 5.9 % (ref 22–41)
MAGNESIUM SERPL-MCNC: 1.8 MG/DL (ref 1.5–2.5)
MCH RBC QN AUTO: 31.5 PG (ref 27–33)
MCHC RBC AUTO-ENTMCNC: 34 G/DL (ref 33.6–35)
MCV RBC AUTO: 92.6 FL (ref 81.4–97.8)
MONOCYTES # BLD AUTO: 0.69 K/UL (ref 0–0.85)
MONOCYTES NFR BLD AUTO: 4.6 % (ref 0–13.4)
NEUTROPHILS # BLD AUTO: 13.29 K/UL (ref 2–7.15)
NEUTROPHILS NFR BLD: 87.7 % (ref 44–72)
NRBC # BLD AUTO: 0 K/UL
NRBC BLD-RTO: 0 /100 WBC
PHOSPHATE SERPL-MCNC: 4.3 MG/DL (ref 2.5–4.5)
PLATELET # BLD AUTO: 380 K/UL (ref 164–446)
PMV BLD AUTO: 9.4 FL (ref 9–12.9)
POTASSIUM SERPL-SCNC: 3.6 MMOL/L (ref 3.6–5.5)
PROT SERPL-MCNC: 5.8 G/DL (ref 6–8.2)
RBC # BLD AUTO: 4.35 M/UL (ref 4.2–5.4)
SIGNIFICANT IND 70042: NORMAL
SIGNIFICANT IND 70042: NORMAL
SITE SITE: NORMAL
SITE SITE: NORMAL
SODIUM SERPL-SCNC: 133 MMOL/L (ref 135–145)
SOURCE SOURCE: NORMAL
SOURCE SOURCE: NORMAL
WBC # BLD AUTO: 15.1 K/UL (ref 4.8–10.8)

## 2018-03-25 PROCEDURE — 700102 HCHG RX REV CODE 250 W/ 637 OVERRIDE(OP): Performed by: INTERNAL MEDICINE

## 2018-03-25 PROCEDURE — A9270 NON-COVERED ITEM OR SERVICE: HCPCS | Performed by: STUDENT IN AN ORGANIZED HEALTH CARE EDUCATION/TRAINING PROGRAM

## 2018-03-25 PROCEDURE — 700102 HCHG RX REV CODE 250 W/ 637 OVERRIDE(OP): Performed by: STUDENT IN AN ORGANIZED HEALTH CARE EDUCATION/TRAINING PROGRAM

## 2018-03-25 PROCEDURE — 83735 ASSAY OF MAGNESIUM: CPT

## 2018-03-25 PROCEDURE — A9270 NON-COVERED ITEM OR SERVICE: HCPCS | Performed by: INTERNAL MEDICINE

## 2018-03-25 PROCEDURE — 80053 COMPREHEN METABOLIC PANEL: CPT

## 2018-03-25 PROCEDURE — 84100 ASSAY OF PHOSPHORUS: CPT

## 2018-03-25 PROCEDURE — 94640 AIRWAY INHALATION TREATMENT: CPT

## 2018-03-25 PROCEDURE — 85025 COMPLETE CBC W/AUTO DIFF WBC: CPT

## 2018-03-25 PROCEDURE — 90732 PPSV23 VACC 2 YRS+ SUBQ/IM: CPT | Performed by: INTERNAL MEDICINE

## 2018-03-25 PROCEDURE — 3E0234Z INTRODUCTION OF SERUM, TOXOID AND VACCINE INTO MUSCLE, PERCUTANEOUS APPROACH: ICD-10-PCS | Performed by: INTERNAL MEDICINE

## 2018-03-25 PROCEDURE — 90471 IMMUNIZATION ADMIN: CPT

## 2018-03-25 PROCEDURE — 700101 HCHG RX REV CODE 250: Performed by: INTERNAL MEDICINE

## 2018-03-25 PROCEDURE — 770001 HCHG ROOM/CARE - MED/SURG/GYN PRIV*

## 2018-03-25 PROCEDURE — 700111 HCHG RX REV CODE 636 W/ 250 OVERRIDE (IP): Performed by: INTERNAL MEDICINE

## 2018-03-25 PROCEDURE — 82962 GLUCOSE BLOOD TEST: CPT | Mod: 91

## 2018-03-25 RX ORDER — ALPRAZOLAM 0.25 MG/1
0.25 TABLET ORAL 2 TIMES DAILY PRN
Status: DISCONTINUED | OUTPATIENT
Start: 2018-03-25 | End: 2018-03-29 | Stop reason: HOSPADM

## 2018-03-25 RX ORDER — HEPARIN SODIUM 5000 [USP'U]/ML
5000 INJECTION, SOLUTION INTRAVENOUS; SUBCUTANEOUS EVERY 8 HOURS
Status: DISCONTINUED | OUTPATIENT
Start: 2018-03-25 | End: 2018-03-29 | Stop reason: HOSPADM

## 2018-03-25 RX ORDER — ALPRAZOLAM 0.25 MG/1
0.5 TABLET ORAL EVERY 8 HOURS PRN
Status: DISCONTINUED | OUTPATIENT
Start: 2018-03-25 | End: 2018-03-25

## 2018-03-25 RX ADMIN — INSULIN HUMAN 3 UNITS: 100 INJECTION, SOLUTION PARENTERAL at 21:30

## 2018-03-25 RX ADMIN — TIOTROPIUM BROMIDE 1 CAPSULE: 18 CAPSULE ORAL; RESPIRATORY (INHALATION) at 06:52

## 2018-03-25 RX ADMIN — LOSARTAN POTASSIUM 50 MG: 50 TABLET, FILM COATED ORAL at 08:40

## 2018-03-25 RX ADMIN — AMLODIPINE BESYLATE 5 MG: 5 TABLET ORAL at 08:40

## 2018-03-25 RX ADMIN — HEPARIN SODIUM 5000 UNITS: 5000 INJECTION, SOLUTION INTRAVENOUS; SUBCUTANEOUS at 21:19

## 2018-03-25 RX ADMIN — POTASSIUM CHLORIDE 20 MEQ: 1500 TABLET, EXTENDED RELEASE ORAL at 08:40

## 2018-03-25 RX ADMIN — FUROSEMIDE 20 MG: 40 TABLET ORAL at 08:41

## 2018-03-25 RX ADMIN — INSULIN HUMAN 2 UNITS: 100 INJECTION, SOLUTION PARENTERAL at 13:01

## 2018-03-25 RX ADMIN — HEPARIN SODIUM 5000 UNITS: 5000 INJECTION, SOLUTION INTRAVENOUS; SUBCUTANEOUS at 14:26

## 2018-03-25 RX ADMIN — METHYLPREDNISOLONE SODIUM SUCCINATE 40 MG: 40 INJECTION, POWDER, FOR SOLUTION INTRAMUSCULAR; INTRAVENOUS at 21:19

## 2018-03-25 RX ADMIN — METHYLPREDNISOLONE SODIUM SUCCINATE 40 MG: 40 INJECTION, POWDER, FOR SOLUTION INTRAMUSCULAR; INTRAVENOUS at 14:19

## 2018-03-25 RX ADMIN — OXYCODONE HYDROCHLORIDE AND ACETAMINOPHEN 1 TABLET: 5; 325 TABLET ORAL at 03:20

## 2018-03-25 RX ADMIN — BUDESONIDE 0.5 MG: 0.5 SUSPENSION RESPIRATORY (INHALATION) at 06:52

## 2018-03-25 RX ADMIN — INSULIN HUMAN 3 UNITS: 100 INJECTION, SOLUTION PARENTERAL at 18:10

## 2018-03-25 RX ADMIN — OXYCODONE HYDROCHLORIDE AND ACETAMINOPHEN 1 TABLET: 5; 325 TABLET ORAL at 21:18

## 2018-03-25 RX ADMIN — BUDESONIDE 0.5 MG: 0.5 SUSPENSION RESPIRATORY (INHALATION) at 19:57

## 2018-03-25 RX ADMIN — OXYCODONE HYDROCHLORIDE AND ACETAMINOPHEN 1 TABLET: 5; 325 TABLET ORAL at 15:34

## 2018-03-25 RX ADMIN — INSULIN HUMAN 3 UNITS: 100 INJECTION, SOLUTION PARENTERAL at 06:05

## 2018-03-25 RX ADMIN — IPRATROPIUM BROMIDE AND ALBUTEROL SULFATE 3 ML: .5; 3 SOLUTION RESPIRATORY (INHALATION) at 14:49

## 2018-03-25 RX ADMIN — IPRATROPIUM BROMIDE AND ALBUTEROL SULFATE 3 ML: .5; 3 SOLUTION RESPIRATORY (INHALATION) at 06:52

## 2018-03-25 RX ADMIN — ALPRAZOLAM 0.5 MG: 0.25 TABLET ORAL at 04:01

## 2018-03-25 RX ADMIN — IPRATROPIUM BROMIDE AND ALBUTEROL SULFATE 3 ML: .5; 3 SOLUTION RESPIRATORY (INHALATION) at 19:57

## 2018-03-25 RX ADMIN — PNEUMOCOCCAL VACCINE POLYVALENT 25 MCG
25; 25; 25; 25; 25; 25; 25; 25; 25; 25; 25; 25; 25; 25; 25; 25; 25; 25; 25; 25; 25; 25; 25 INJECTION, SOLUTION INTRAMUSCULAR; SUBCUTANEOUS at 12:00

## 2018-03-25 RX ADMIN — NICOTINE 7 MG: 7 PATCH, EXTENDED RELEASE TRANSDERMAL at 06:00

## 2018-03-25 RX ADMIN — METHYLPREDNISOLONE SODIUM SUCCINATE 40 MG: 40 INJECTION, POWDER, FOR SOLUTION INTRAMUSCULAR; INTRAVENOUS at 06:01

## 2018-03-25 RX ADMIN — QUETIAPINE FUMARATE 25 MG: 25 TABLET ORAL at 21:18

## 2018-03-25 ASSESSMENT — ENCOUNTER SYMPTOMS
NAUSEA: 0
BACK PAIN: 1
FOCAL WEAKNESS: 0
LOSS OF CONSCIOUSNESS: 0
SPUTUM PRODUCTION: 1
VOMITING: 0
HEADACHES: 0
COUGH: 1
CHILLS: 0
WHEEZING: 1
SHORTNESS OF BREATH: 1
PALPITATIONS: 0
FEVER: 0
FALLS: 0
SPEECH CHANGE: 0
HEMOPTYSIS: 0

## 2018-03-25 ASSESSMENT — PAIN SCALES - GENERAL
PAINLEVEL_OUTOF10: 0
PAINLEVEL_OUTOF10: 8
PAINLEVEL_OUTOF10: 2
PAINLEVEL_OUTOF10: 0
PAINLEVEL_OUTOF10: 0
PAINLEVEL_OUTOF10: 4
PAINLEVEL_OUTOF10: 7
PAINLEVEL_OUTOF10: 4
PAINLEVEL_OUTOF10: 7
PAINLEVEL_OUTOF10: 0

## 2018-03-25 ASSESSMENT — PATIENT HEALTH QUESTIONNAIRE - PHQ9
SUM OF ALL RESPONSES TO PHQ9 QUESTIONS 1 AND 2: 0
1. LITTLE INTEREST OR PLEASURE IN DOING THINGS: NOT AT ALL
2. FEELING DOWN, DEPRESSED, IRRITABLE, OR HOPELESS: NOT AT ALL

## 2018-03-25 ASSESSMENT — COPD QUESTIONNAIRES
DO YOU EVER COUGH UP ANY MUCUS OR PHLEGM?: NO/ONLY WITH OCCASIONAL COLDS OR INFECTIONS
COPD SCREENING SCORE: 6
DURING THE PAST 4 WEEKS HOW MUCH DID YOU FEEL SHORT OF BREATH: NONE/LITTLE OF THE TIME
HAVE YOU SMOKED AT LEAST 100 CIGARETTES IN YOUR ENTIRE LIFE: YES

## 2018-03-25 NOTE — PROGRESS NOTES
[facility] Transfer Note     # Diagnosis is septic shock secondary to CAP followed by pulmonary edema secondary to IVF resuscitation    77 year old female with a PMH of current tobacco use, COPD not on home 02, lung cancer s/p lobectomy, coronary artery disease s/p CABG presented to the ED on 3/20/2018 with complaints of SOB, cough productive of green sputum. Patient was seen at urgent care 3 days prior to admit and placed on azithromycin which did not relieve the patient's symptoms. Her symptoms continued to worsen and she returned to urgent care. She was found to be hypoxic on room air Sp02 in 70's and was sent to the ED here at [facility]. Patient was found to be hypotensive in the ED and after 1.6L IV fluid bolus per sepsis protocol her blood pressure remained low. At that time a central line in right IJ was placed for pressor therapy which she only required briefly in the ICU. Oxygen requirement's increased from 4 L nasal cannula to 4 L oxymask and lung auscultation revealed diffuse scattered wheezing. She was initiated on IV unasyn and azithromycin and started on IV steroids. Patient was placed in ICU for pressors and close observation. Patient has been intermittently tachycardic, worsened by her anxiety and has required frequent ativan/xanax to help alleviate her symptoms. During her ICU stay patient experienced  hemodynamic instability with a near cardiac arrest and her code status was updated to DNR/DNI. Patient remained critically ill with moderate respiratory distress secondary to her acute, severe COPD during her ICU stay despite treatment. She has a history of lung cancer s/p lobectomy and CT-A showed scattered opacities that may have represented multifocal pneumonia and/or metastatic cancer (did not want to pursue a tissue diagnosis or treatment). She also has severe pulmonary HTN and afib/flutter. She has been experiencing dysphagia and is currently NPO pending speech re-eval. Lasix was initiated for  mild fluid overload. Palliative medicine assessed patient on 3/24 with a family meeting on 3/25. The decision was made to proceed with hospice and a referral was placed on 3/25. Patient did not transfer on 3/25/2018 due to bed availability and will transfer today to medical floor. Patient speech eval recommend full liq diet, Case was discussed with senior resident, Dr. Fowler

## 2018-03-25 NOTE — FLOWSHEET NOTE
03/24/18 1839   Interdisciplinary Plan of Care-Goals (Indications)   Obstructive Ventilatory Defect or Pulmonary Disease without Obvious Obstruction History / Diagnosis   Interdisciplinary Plan of Care-Outcomes    Bronchodilator Outcome Patient at Stable Baseline   Education   Education Yes - Pt. / Family has been Instructed in use of Respiratory Equipment;Yes - Pt. / Family has been Instructed in use of Respiratory Medications and Adverse Reactions   RT Assessment of Delivered Medications   Evaluation of Medication Delivery Daily Yes-- Pt /Family has been Instructed in use of Respiratory Medications and Adverse Reactions   SVN Group   #SVN Performed Yes   Given By: Mouthpiece   Respiratory WDL   Respiratory (WDL) X   Chest Exam   Work Of Breathing / Effort Mild   Respiration (!) 25   Pulse (!) 108   Heart Rate (Monitored) (!) 107   Breath Sounds   Pre/Post Intervention Pre Intervention Assessment   RUL Breath Sounds Clear   RML Breath Sounds Clear   RLL Breath Sounds Diminished   RUDY Breath Sounds Clear   LLL Breath Sounds Diminished   Oximetry   Continuous Oximetry Yes   Oxygen   Pulse Oximetry 92 %   O2 (LPM) 3   O2 Daily Delivery Respiratory  Nasal Cannula

## 2018-03-25 NOTE — PROGRESS NOTES
Bedside report received from DM Stinson  . Pt assessed and lines verified. VSS and patient resting on 3LNC. Bed low, locked and alarms on. Call light within reach. Plan of care discussed with patient and communication board updated.

## 2018-03-25 NOTE — DISCHARGE PLANNING
Palliative Social Work    Message received from pts son, Raymundo Thapa, requesting that a hospice referral be sent to [facility].  Choice form faxed to [facility].

## 2018-03-25 NOTE — CARE PLAN
Problem: Pain Management  Goal: Pain level will decrease to patient's comfort goal  Outcome: PROGRESSING AS EXPECTED  Patient receiving prn PO Percocet for back pain.  Heat packs applied to back. Patient reporting decrease in pain.     Problem: Fluid Volume:  Goal: Will maintain balanced intake and output  Outcome: PROGRESSING AS EXPECTED  Patient receiving IV lasix. Patient had over 3L UOP per muir catheter. Patient encouraged to increase PO fluid, as intake is low. Patient SBP 110s.

## 2018-03-25 NOTE — FLOWSHEET NOTE
This note also relates to the following rows which could not be included:  Respiration - Cannot attach notes to unvalidated device data  Pulse - Cannot attach notes to unvalidated device data  Heart Rate (Monitored) - Cannot attach notes to unvalidated device data       03/24/18 5576   Interdisciplinary Plan of Care-Goals (Indications)   Obstructive Ventilatory Defect or Pulmonary Disease without Obvious Obstruction History / Diagnosis   Interdisciplinary Plan of Care-Outcomes    Bronchodilator Outcome Patient at Stable Baseline   Education   Education Yes - Pt. / Family has been Instructed in use of Respiratory Equipment;Yes - Pt. / Family has been Instructed in use of Respiratory Medications and Adverse Reactions   RT Assessment of Delivered Medications   Evaluation of Medication Delivery Daily Yes-- Pt /Family has been Instructed in use of Respiratory Medications and Adverse Reactions   SVN Group   #SVN Performed Yes   Given By: Mouthpiece   Respiratory WDL   Respiratory (WDL) X   Breath Sounds   Pre/Post Intervention Pre Intervention Assessment   RUL Breath Sounds Fine Crackles   RML Breath Sounds Fine Crackles   RLL Breath Sounds Fine Crackles   RUDY Breath Sounds Fine Crackles   LLL Breath Sounds Fine Crackles   Secretions   Cough Moist;Non Productive   Oximetry   Continuous Oximetry Yes   Oxygen   Pulse Oximetry 92 %   O2 Daily Delivery Respiratory  Nasal Cannula

## 2018-03-25 NOTE — PALLIATIVE CARE
"Palliative Care follow-up  PC RN met with Eliane Bellamy, Praveen and other daughter from [place] at bedside to discuss goals and POC. Mia was tearful when expressing that she has cancer and is \"going to die.\" She asked about \"how much time\" and PC RN noted that without official biopsy, it's hard to know exactly what she's dealing with but the MD had inferred her current disease process/situation from her current scans and previous history. 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 Pursuing hospice was in line with their wishes at this time given she desires no work-up/treatment of the lung spots and does not desire to continue returning to the hospital for further treatments.     Praveen expressed some concerns with the home situation at Eliane's noting \"there are people sleeping on the couch and little babies there, plus no one is there all day to be with her.\" Eliane works and it appears the other members of the home are \"not reliable.\" PC RN discussed the role of GH and they expressed understanding noting that their dad had been in a GH on hospice for the last month of his life. PC RN encouraged them to provide this RN with a hospice choice and ask that they assist with GH placement based on their recommendations. They were agreeable to this plan and Raymundo will call this RN with hospice choice when he can access his files at home to determine who cared for their father. JUANY explained and completed noting DNR and comfort focused treatment; reviewed and signed by the patient. PC RN explained the process for DC upon hospice acceptance and locating a GH. Contact " information provided to all children and encouraged they call with any questions or concerns.       Updated: JAIME RN/MD- POLST reviewed and signed    Plan: hospice referral; GH placement- requested quantiferon to be ordered. POLST on chart for DC; scan into EMR    Recommendations: I recommend a hospice consult.    Thank you for allowing Palliative Care to participate in this patient's care. Please feel free to call [phone] with any questions or concerns.

## 2018-03-25 NOTE — PROGRESS NOTES
[facility] ICU Progress Note      Admit Date: 3/20/2018  ICU Day: 6    Resident(s): Tomer Schultz  Attending: JULIEN FELDMAN/ Dr. Claire    Date & Time:   3/25/2018   1:47 PM       Patient ID:    Name:             Mia Hammond   YOB: 1940  Age:                 77 y.o.  female   MRN:               [id number]    ID:  77 year old female with a PMH of current tobacco use, COPD not on home 02 lung cancer s/p lobectomy, coronary artery disease s/p CABG presented to the ED on 3/20/2018 with complaints of SOB, cough productive of green sputum. Patient was seen at urgent care 3 days prior to admit and placed on azithromycin which did not relieve the patient's symptoms. Her symptoms continued to worsen and returned to urgent care. She was found to be hypoxic on room air Sp02 in 70's and was sent to the ED here at [facility]. Patient was found to be hypotensive in the ED and after 1.6L IV fluid bolus per sepsis protocol her blood pressure remained low. At that time a central line in right IJ was placed for pressor therapy which she only required briefly in the ICU. Oxygen requirement's increased from 4 L nasal cannula to 4 L oxymask and lung auscultation revealed diffuse scattered wheezing. She was initiated on IV unasyn and azithromycin and started on IV steroids. Patient was placed in ICU for pressors and close observation.    Consultants:  PMA: Dr. Claire    Interval Events:  Mild respiratory distress  Anxious, xanax added  NPO, waiting speech evaluation for vomiting, choking  Coarse breath sounds on exam  Abx course finished 3/24, continue IV steroids  Continue lasix, low dose  Palliative care assessed, referred to hospice      Review of Systems   Constitutional: Positive for malaise/fatigue. Negative for chills and fever.   Respiratory: Positive for cough, sputum production, shortness of breath and wheezing. Negative for hemoptysis.    Cardiovascular: Negative for chest pain, palpitations and leg  "swelling.   Gastrointestinal: Negative for nausea and vomiting.   Musculoskeletal: Positive for back pain. Negative for falls.   Neurological: Negative for speech change, focal weakness, loss of consciousness and headaches.       PHYSICAL EXAM  Vitals:    03/25/18 0500 03/25/18 0600 03/25/18 0652 03/25/18 0800   BP:       Pulse: 99 100 97 (!) 106   Resp: 18 (!) 25 18 18   Temp:  36.6 °C (97.9 °F)  37 °C (98.6 °F)   SpO2: 95% 95% 95% 94%   Weight:  51.9 kg (114 lb 6.7 oz)     Height:         Body mass index is 23.07 kg/m².  BP (!) 69/50   Pulse (!) 106   Temp 37 °C (98.6 °F)   Resp 18   Ht 1.5 m (4' 11.06\")   Wt 51.9 kg (114 lb 6.7 oz)   SpO2 94%   Breastfeeding? No   BMI 23.07 kg/m²   O2 therapy: Pulse Oximetry: 94 %, O2 (LPM): 3, O2 Delivery: Silicone Nasal Cannula    Physical Exam   Constitutional: She is oriented to person, place, and time.   Ill-appearing   HENT:   Head: Normocephalic and atraumatic.   Eyes: Pupils are equal, round, and reactive to light.   Cardiovascular: Regular rhythm.    tachycardic   Pulmonary/Chest: She has wheezes. She has rales.   Coarse breath sounds through out the lungs.   Abdominal: Soft. She exhibits no distension. There is no tenderness.   Musculoskeletal: She exhibits no tenderness.   Neurological: She is oriented to person, place, and time.   Skin: Skin is warm.           Respiratory:     Respiration: 18, Pulse Oximetry: 94 %, O2 Daily Delivery Respiratory : Silicone Nasal Cannula    Chest Tube Drains:          Invalid input(s): [id number]    HemoDynamics:  Pulse: (!) 106, Heart Rate (Monitored): (!) 105 NIBP: 135/63        Fluids:    Date 03/25/18 0700 - 03/26/18 0659   Shift 4472-3643 5071-8216 5126-6072 24 Hour Total   I  N  T  A  K  E   P.O. 120   120      P.O. 120   120    I.V. 20   20      IV Volume (NS) 20   20    Shift Total 140   140   O  U  T  P  U  T   Urine 120   120      Indwelling Cathether 120   120    Stool          Number of Times Stooled 0 x   0 x    " Shift Total 120   120   NET 20   20        Intake/Output Summary (Last 24 hours) at 18 1446  Last data filed at 18 1200   Gross per 24 hour   Intake              480 ml   Output              900 ml   Net             -420 ml       Weight: 51.9 kg (114 lb 6.7 oz)  Body mass index is 23.07 kg/m².    Recent Labs      18   06   SODIUM  137   < >  135  137  133*   POTASSIUM  2.9*   < >  2.7*  3.2*  3.6   CHLORIDE  97   < >  95*  89*  88*   CO2  30   < >  28  38*  36*   BUN  13   < >  16  20  23*   CREATININE  0.66   < >  0.53  0.80  0.69   MAGNESIUM  1.3*   --   1.4*   --   1.8   PHOSPHORUS  3.5   --   2.7   --   4.3   CALCIUM  8.1*   < >  7.4*  8.6  8.6    < > = values in this interval not displayed.       GI/Nutrition:  Recent Labs      18   06   ALTSGPT  21  23   --   18   ASTSGOT  60*  32   --   17   ALKPHOSPHAT  88  101*   --   90   TBILIRUBIN  0.3  0.4   --   0.5   GLUCOSE  164*  179*  172*  234*       Heme:  Recent Labs      18   06   RBC  3.76*  4.17*  4.35   HEMOGLOBIN  11.7*  12.7  13.7   HEMATOCRIT  35.4*  38.5  40.3   PLATELETCT  312  334  380       Infectious Disease:  Temp  Av.8 °C (98.2 °F)  Min: 36.6 °C (97.9 °F)  Max: 37 °C (98.6 °F)  Recent Labs      18   06   WBC  15.0*   --   14.3*  15.1*   NEUTSPOLYS  86.30*   --   83.60*  87.70*   LYMPHOCYTES  9.40*   --   8.40*  5.90*   MONOCYTES  3.40   --   5.00  4.60   EOSINOPHILS  0.00   --   0.00  0.00   BASOPHILS  0.00   --   0.20  0.30   ASTSGOT  52*  60*  32  17   ALTSGPT  21  21  23  18   ALKPHOSPHAT  103*  88  101*  90   TBILIRUBIN  0.3  0.3  0.4  0.5       Meds:  • ALPRAZolam  0.25 mg     • amLODIPine  5 mg     • labetalol  10 mg     • ondansetron  4 mg     • methylPREDNISolone  40 mg     • ipratropium-albuterol  3 mL      • insulin regular  2-9 Units      And   • glucose 4 g  16 g      And   • dextrose 50%  25 mL     • ipratropium-albuterol  3 mL     • budesonide  0.5 mg     • Respiratory Care per Protocol       • tiotropium  1 Cap     • nicotine  7 mg     • QUEtiapine  25 mg     • furosemide  20 mg     • losartan  50 mg     • oxyCODONE-acetaminophen  1 Tab     • potassium chloride SA  20 mEq     • hydrALAZINE  20 mg     • calcium carbonate  500 mg     • lidocaine viscous 2%  5 mL     • glycopyrrolate  1 mg          Procedures:  Right IJ central catheter 3/21    Imaging:  DX-CHEST-PORTABLE (1 VIEW)   Final Result      Findings consistent with pulmonary edema, improved compared with 3/22.      DX-CHEST-PORTABLE (1 VIEW)   Final Result      Moderate diffuse interstitial opacities increased when compared to previous and consistent with edema or pneumonitis. No pneumothorax.   Ill-defined opacities left lung apex appears similar to recent findings. Continue follow-up.      ECHOCARDIOGRAM COMP W/O CONT   Final Result      CT-CTA CHEST PULMONARY ARTERY W/ RECONS   Final Result   Addendum 1 of 1   Right central venous catheter with tip in the mid SVC.      Final         1. No CT evidence of pulmonary embolism.      2. Groundglass and airspace opacities in the left lingula and left lower lobe. Small areas of peripheral groundglass opacities in the right lung as well. These are concerning for multifocal pneumonia. The largest area is in the left lower lobe.      3. Postsurgical change in the left upper lobe with nodular opacities. Comparison to prior study could be helpful for interval change.          Problem and Plan:      * Septic shock (CMS-HCC)- (present on admission)   Assessment & Plan    - Resolved  - Hx COPD 2/2 tobacco, lung cancer s/p lobectomy 2008  - Not on home O2 therapy  - CT shows scattered groundglass opacities  - Blood, urine cx negative  - Finished course unasyn  - IV steroids  - Supplemental 02        Acute hypoxemic  respiratory failure (CMS-[facility])- (present on admission)   Assessment & Plan    - Hx COPD secondary to tobacco use, cancer s/p lobectomy  - Echo shows pulm HTN  - Now requiring supplemental 02  - Duoneb PRN  - IV abx finished, IV steroids, duoneb  - Respiratory protocol   - Refer to hospice        CAP (community acquired pneumonia)- (present on admission)   Assessment & Plan    - CT shows scattered groundglass opacities that may indicate pneumonia  - Finished course IV unasyn  - Supplemental 02 PRN          COPD with acute exacerbation (CMS-HCC)- (present on admission)   Assessment & Plan    - 55 pack year tobacco history  - Continues to use tobacco  - Supplemental 02 PRN  - Unasyn course finished 3/24  - IV steroids q8hr  - Duoneb scheduled  - IS, expectorants  - End stage, palliative saw today, referral to hospice placed        History of lung cancer- (present on admission)   Assessment & Plan    - S/p right upper lobectomy in 2008 during same surgery CABG was performed. Performed at [facility] in [facility].  - Not followed by oncology  - CT chest shows scattered opacities  - Continues to use tobacco          Dysphagia   Assessment & Plan    - Was on dysphagia 2 yesterday after FEES, made NPO overnight due to clinical worsening  - Waiting for Speech to re-evaluated before starting diet/PO meds         Electrolyte abnormality   Assessment & Plan    - monitor and replete K and Mg, IV until Speech approves PO meds         Hypothyroidism- (present on admission)   Assessment & Plan    - TSH WNL    - Continue home med         Diabetes mellitus type 2, controlled (CMS-HCC)- (present on admission)   Assessment & Plan    - Held home metformin  - ISS/hypoglycemic protocol inpatient and monitor glucose level  - Last Hb A1C 6.8          Severe episode of recurrent major depressive disorder, without psychotic features (CMS-HCC)- (present on admission)   Assessment & Plan    - Continue home medications               DISPO: ICU, transfer to floor    CODE STATUS: No chest compression, no defibrillations, no intubation    Quality Measures:  Salcido Catheter: No  DVT Prophylaxis: Heparin  Ulcer Prophylaxis: No  Antibiotics: Finished full course unasyn.  Lines: PIV, Right IJ central line

## 2018-03-25 NOTE — DISCHARGE PLANNING
Medical Social Work    Referral: Hospice referral      Intervention: This writer spoke to JULIEN thomas who will place order hospice. Referral sent to [facility].     Plan: Wait for [facility].

## 2018-03-25 NOTE — PROGRESS NOTES
Patient complaining of increasing anxiety. Patient requesting Xanax. None in MAR. Patient paged on-call UNSTEPHAN Curran for orders. Awaiting call back.

## 2018-03-26 ENCOUNTER — APPOINTMENT (OUTPATIENT)
Dept: RADIOLOGY | Facility: MEDICAL CENTER | Age: 78
DRG: 871 | End: 2018-03-26
Attending: INTERNAL MEDICINE
Payer: MEDICARE

## 2018-03-26 LAB
ALBUMIN SERPL BCP-MCNC: 2.9 G/DL (ref 3.2–4.9)
ALBUMIN/GLOB SERPL: 1.2 G/DL
ALP SERPL-CCNC: 75 U/L (ref 30–99)
ALT SERPL-CCNC: 16 U/L (ref 2–50)
ANION GAP SERPL CALC-SCNC: 11 MMOL/L (ref 0–11.9)
ANION GAP SERPL CALC-SCNC: 9 MMOL/L (ref 0–11.9)
AST SERPL-CCNC: 15 U/L (ref 12–45)
BASOPHILS # BLD AUTO: 0.2 % (ref 0–1.8)
BASOPHILS # BLD: 0.03 K/UL (ref 0–0.12)
BILIRUB SERPL-MCNC: 0.4 MG/DL (ref 0.1–1.5)
BNP SERPL-MCNC: 187 PG/ML (ref 0–100)
BUN SERPL-MCNC: 20 MG/DL (ref 8–22)
BUN SERPL-MCNC: 27 MG/DL (ref 8–22)
CALCIUM SERPL-MCNC: 7.7 MG/DL (ref 8.5–10.5)
CALCIUM SERPL-MCNC: 8.7 MG/DL (ref 8.5–10.5)
CHLORIDE SERPL-SCNC: 88 MMOL/L (ref 96–112)
CHLORIDE SERPL-SCNC: 95 MMOL/L (ref 96–112)
CO2 SERPL-SCNC: 31 MMOL/L (ref 20–33)
CO2 SERPL-SCNC: 34 MMOL/L (ref 20–33)
CREAT SERPL-MCNC: 0.65 MG/DL (ref 0.5–1.4)
CREAT SERPL-MCNC: 0.72 MG/DL (ref 0.5–1.4)
EOSINOPHIL # BLD AUTO: 0 K/UL (ref 0–0.51)
EOSINOPHIL NFR BLD: 0 % (ref 0–6.9)
ERYTHROCYTE [DISTWIDTH] IN BLOOD BY AUTOMATED COUNT: 43 FL (ref 35.9–50)
GLOBULIN SER CALC-MCNC: 2.4 G/DL (ref 1.9–3.5)
GLUCOSE BLD-MCNC: 176 MG/DL (ref 65–99)
GLUCOSE BLD-MCNC: 205 MG/DL (ref 65–99)
GLUCOSE BLD-MCNC: 243 MG/DL (ref 65–99)
GLUCOSE BLD-MCNC: 280 MG/DL (ref 65–99)
GLUCOSE SERPL-MCNC: 191 MG/DL (ref 65–99)
GLUCOSE SERPL-MCNC: 224 MG/DL (ref 65–99)
HCT VFR BLD AUTO: 41 % (ref 37–47)
HGB BLD-MCNC: 13.9 G/DL (ref 12–16)
IMM GRANULOCYTES # BLD AUTO: 0.26 K/UL (ref 0–0.11)
IMM GRANULOCYTES NFR BLD AUTO: 1.8 % (ref 0–0.9)
LYMPHOCYTES # BLD AUTO: 0.99 K/UL (ref 1–4.8)
LYMPHOCYTES NFR BLD: 7 % (ref 22–41)
MAGNESIUM SERPL-MCNC: 1.5 MG/DL (ref 1.5–2.5)
MCH RBC QN AUTO: 31.7 PG (ref 27–33)
MCHC RBC AUTO-ENTMCNC: 33.9 G/DL (ref 33.6–35)
MCV RBC AUTO: 93.4 FL (ref 81.4–97.8)
MONOCYTES # BLD AUTO: 0.43 K/UL (ref 0–0.85)
MONOCYTES NFR BLD AUTO: 3 % (ref 0–13.4)
NEUTROPHILS # BLD AUTO: 12.51 K/UL (ref 2–7.15)
NEUTROPHILS NFR BLD: 88 % (ref 44–72)
NRBC # BLD AUTO: 0 K/UL
NRBC BLD-RTO: 0 /100 WBC
PHOSPHATE SERPL-MCNC: 4.4 MG/DL (ref 2.5–4.5)
PLATELET # BLD AUTO: 312 K/UL (ref 164–446)
PMV BLD AUTO: 10 FL (ref 9–12.9)
POTASSIUM SERPL-SCNC: 3.5 MMOL/L (ref 3.6–5.5)
POTASSIUM SERPL-SCNC: 3.9 MMOL/L (ref 3.6–5.5)
PROT SERPL-MCNC: 5.3 G/DL (ref 6–8.2)
RBC # BLD AUTO: 4.39 M/UL (ref 4.2–5.4)
SODIUM SERPL-SCNC: 133 MMOL/L (ref 135–145)
SODIUM SERPL-SCNC: 135 MMOL/L (ref 135–145)
WBC # BLD AUTO: 14.2 K/UL (ref 4.8–10.8)

## 2018-03-26 PROCEDURE — A9270 NON-COVERED ITEM OR SERVICE: HCPCS | Performed by: INTERNAL MEDICINE

## 2018-03-26 PROCEDURE — 83880 ASSAY OF NATRIURETIC PEPTIDE: CPT

## 2018-03-26 PROCEDURE — 94640 AIRWAY INHALATION TREATMENT: CPT

## 2018-03-26 PROCEDURE — 80053 COMPREHEN METABOLIC PANEL: CPT

## 2018-03-26 PROCEDURE — 71045 X-RAY EXAM CHEST 1 VIEW: CPT

## 2018-03-26 PROCEDURE — 84100 ASSAY OF PHOSPHORUS: CPT

## 2018-03-26 PROCEDURE — 770001 HCHG ROOM/CARE - MED/SURG/GYN PRIV*

## 2018-03-26 PROCEDURE — 700102 HCHG RX REV CODE 250 W/ 637 OVERRIDE(OP): Performed by: INTERNAL MEDICINE

## 2018-03-26 PROCEDURE — 85025 COMPLETE CBC W/AUTO DIFF WBC: CPT

## 2018-03-26 PROCEDURE — 700101 HCHG RX REV CODE 250: Performed by: INTERNAL MEDICINE

## 2018-03-26 PROCEDURE — A9270 NON-COVERED ITEM OR SERVICE: HCPCS | Performed by: STUDENT IN AN ORGANIZED HEALTH CARE EDUCATION/TRAINING PROGRAM

## 2018-03-26 PROCEDURE — 80048 BASIC METABOLIC PNL TOTAL CA: CPT

## 2018-03-26 PROCEDURE — 94760 N-INVAS EAR/PLS OXIMETRY 1: CPT

## 2018-03-26 PROCEDURE — 700111 HCHG RX REV CODE 636 W/ 250 OVERRIDE (IP): Performed by: INTERNAL MEDICINE

## 2018-03-26 PROCEDURE — 700102 HCHG RX REV CODE 250 W/ 637 OVERRIDE(OP): Performed by: STUDENT IN AN ORGANIZED HEALTH CARE EDUCATION/TRAINING PROGRAM

## 2018-03-26 PROCEDURE — 83735 ASSAY OF MAGNESIUM: CPT

## 2018-03-26 PROCEDURE — 82962 GLUCOSE BLOOD TEST: CPT | Mod: 91

## 2018-03-26 PROCEDURE — 86480 TB TEST CELL IMMUN MEASURE: CPT

## 2018-03-26 PROCEDURE — 94667 MNPJ CHEST WALL 1ST: CPT

## 2018-03-26 PROCEDURE — 92526 ORAL FUNCTION THERAPY: CPT

## 2018-03-26 RX ORDER — IPRATROPIUM BROMIDE AND ALBUTEROL SULFATE 2.5; .5 MG/3ML; MG/3ML
3 SOLUTION RESPIRATORY (INHALATION)
Status: DISCONTINUED | OUTPATIENT
Start: 2018-03-26 | End: 2018-03-29 | Stop reason: HOSPADM

## 2018-03-26 RX ADMIN — ALPRAZOLAM 0.25 MG: 0.25 TABLET ORAL at 10:58

## 2018-03-26 RX ADMIN — METHYLPREDNISOLONE SODIUM SUCCINATE 40 MG: 40 INJECTION, POWDER, FOR SOLUTION INTRAMUSCULAR; INTRAVENOUS at 20:01

## 2018-03-26 RX ADMIN — IPRATROPIUM BROMIDE AND ALBUTEROL SULFATE 3 ML: .5; 3 SOLUTION RESPIRATORY (INHALATION) at 12:36

## 2018-03-26 RX ADMIN — NICOTINE 7 MG: 7 PATCH, EXTENDED RELEASE TRANSDERMAL at 06:00

## 2018-03-26 RX ADMIN — INSULIN HUMAN 3 UNITS: 100 INJECTION, SOLUTION PARENTERAL at 18:15

## 2018-03-26 RX ADMIN — OXYCODONE HYDROCHLORIDE AND ACETAMINOPHEN 1 TABLET: 5; 325 TABLET ORAL at 20:00

## 2018-03-26 RX ADMIN — METHYLPREDNISOLONE SODIUM SUCCINATE 40 MG: 40 INJECTION, POWDER, FOR SOLUTION INTRAMUSCULAR; INTRAVENOUS at 13:14

## 2018-03-26 RX ADMIN — IPRATROPIUM BROMIDE AND ALBUTEROL SULFATE 3 ML: .5; 3 SOLUTION RESPIRATORY (INHALATION) at 09:31

## 2018-03-26 RX ADMIN — HEPARIN SODIUM 5000 UNITS: 5000 INJECTION, SOLUTION INTRAVENOUS; SUBCUTANEOUS at 06:01

## 2018-03-26 RX ADMIN — IPRATROPIUM BROMIDE AND ALBUTEROL SULFATE 3 ML: .5; 3 SOLUTION RESPIRATORY (INHALATION) at 15:59

## 2018-03-26 RX ADMIN — AMLODIPINE BESYLATE 5 MG: 5 TABLET ORAL at 08:45

## 2018-03-26 RX ADMIN — HEPARIN SODIUM 5000 UNITS: 5000 INJECTION, SOLUTION INTRAVENOUS; SUBCUTANEOUS at 13:15

## 2018-03-26 RX ADMIN — POTASSIUM CHLORIDE 20 MEQ: 1500 TABLET, EXTENDED RELEASE ORAL at 08:45

## 2018-03-26 RX ADMIN — OXYCODONE HYDROCHLORIDE AND ACETAMINOPHEN 1 TABLET: 5; 325 TABLET ORAL at 08:45

## 2018-03-26 RX ADMIN — METHYLPREDNISOLONE SODIUM SUCCINATE 40 MG: 40 INJECTION, POWDER, FOR SOLUTION INTRAMUSCULAR; INTRAVENOUS at 06:00

## 2018-03-26 RX ADMIN — INSULIN HUMAN 2 UNITS: 100 INJECTION, SOLUTION PARENTERAL at 06:32

## 2018-03-26 RX ADMIN — HEPARIN SODIUM 5000 UNITS: 5000 INJECTION, SOLUTION INTRAVENOUS; SUBCUTANEOUS at 20:01

## 2018-03-26 RX ADMIN — QUETIAPINE FUMARATE 25 MG: 25 TABLET ORAL at 20:00

## 2018-03-26 RX ADMIN — INSULIN HUMAN 5 UNITS: 100 INJECTION, SOLUTION PARENTERAL at 20:28

## 2018-03-26 RX ADMIN — TIOTROPIUM BROMIDE 1 CAPSULE: 18 CAPSULE ORAL; RESPIRATORY (INHALATION) at 09:33

## 2018-03-26 RX ADMIN — INSULIN HUMAN 3 UNITS: 100 INJECTION, SOLUTION PARENTERAL at 13:19

## 2018-03-26 RX ADMIN — FUROSEMIDE 20 MG: 40 TABLET ORAL at 08:45

## 2018-03-26 RX ADMIN — LOSARTAN POTASSIUM 50 MG: 50 TABLET, FILM COATED ORAL at 08:46

## 2018-03-26 RX ADMIN — BUDESONIDE 0.5 MG: 0.5 SUSPENSION RESPIRATORY (INHALATION) at 09:31

## 2018-03-26 ASSESSMENT — ENCOUNTER SYMPTOMS
VOMITING: 0
FOCAL WEAKNESS: 0
NAUSEA: 0
SPEECH CHANGE: 0
CHILLS: 0
BACK PAIN: 1
LOSS OF CONSCIOUSNESS: 0
WHEEZING: 0
SPUTUM PRODUCTION: 1
HEMOPTYSIS: 0
FEVER: 0
FALLS: 0
SHORTNESS OF BREATH: 1
COUGH: 1
WHEEZING: 1
PALPITATIONS: 0
HEADACHES: 0

## 2018-03-26 ASSESSMENT — COPD QUESTIONNAIRES
DO YOU EVER COUGH UP ANY MUCUS OR PHLEGM?: NO/ONLY WITH OCCASIONAL COLDS OR INFECTIONS
HAVE YOU SMOKED AT LEAST 100 CIGARETTES IN YOUR ENTIRE LIFE: YES
DURING THE PAST 4 WEEKS HOW MUCH DID YOU FEEL SHORT OF BREATH: NONE/LITTLE OF THE TIME
COPD SCREENING SCORE: 6

## 2018-03-26 ASSESSMENT — LIFESTYLE VARIABLES: DO YOU DRINK ALCOHOL: NO

## 2018-03-26 ASSESSMENT — PAIN SCALES - GENERAL
PAINLEVEL_OUTOF10: 7
PAINLEVEL_OUTOF10: 4
PAINLEVEL_OUTOF10: 3
PAINLEVEL_OUTOF10: 0

## 2018-03-26 NOTE — CARE PLAN
Problem: Communication  Goal: The ability to communicate needs accurately and effectively will improve    Intervention: Educate patient and significant other/support system about the plan of care, procedures, treatments, medications and allow for questions  Transfer POC discussed with pt and family bedside      Problem: Venous Thromboembolism (VTW)/Deep Vein Thrombosis (DVT) Prevention:  Goal: Patient will participate in Venous Thrombosis (VTE)/Deep Vein Thrombosis (DVT)Prevention Measures    Intervention: Ensure patient wears graduated elastic stockings (KELLIE hose) and/or SCDs, if ordered, when in bed or chair (Remove at least once per shift for skin check)  SCDs in place; removed and skin assessed

## 2018-03-26 NOTE — DISCHARGE PLANNING
FRANK alvarado Kindra at [facility]. Discussed the pt transitioning to an inpatient treatment facility on hospice. Pt has medicare and a secondary of, [employer]. It is possible that the pt has a Long Term Stay option on with her [employer] policy. Palliative is following the pt. TC to Palliative. Requested that Palliative RN Cheri make contact with this SW.

## 2018-03-26 NOTE — PROGRESS NOTES
Critical Care  Progress Note      Admit Date: 3/20/2018  ICU Day: 7        Date & Time:   3/26/2018   4:27 PM       Patient ID:    Name:             Mia Hammond   YOB: 1940  Age:                 77 y.o.  female   MRN:               [id number]    ID:  77 year old female with a PMH of current tobacco use, COPD not on home 02 lung cancer s/p lobectomy, coronary artery disease s/p CABG presented to the ED on 3/20/2018 with complaints of SOB, cough productive of green sputum. Patient was seen at urgent care 3 days prior to admit and placed on azithromycin which did not relieve the patient's symptoms. Her symptoms continued to worsen and returned to urgent care. She was found to be hypoxic on room air Sp02 in 70's and was sent to the ED here at [facility]. Patient was found to be hypotensive in the ED and after 1.6L IV fluid bolus per sepsis protocol her blood pressure remained low. At that time a central line in right IJ was placed for pressor therapy which she only required briefly in the ICU. Oxygen requirement's increased from 4 L nasal cannula to 4 L oxymask and lung auscultation revealed diffuse scattered wheezing. She was initiated on IV unasyn and azithromycin and started on IV steroids. Patient was placed in ICU for pressors and close observation.    Consultants:  PMA    Interval Events:    SOB improved today   Denies any pain.   Excited to be transferred to floor   On O2 4 LPM   Duo Neb prn             Review of Systems   Constitutional: Positive for malaise/fatigue. Negative for chills and fever.   Respiratory: Positive for cough, sputum production, shortness of breath and wheezing. Negative for hemoptysis.    Cardiovascular: Negative for chest pain, palpitations and leg swelling.   Gastrointestinal: Negative for nausea and vomiting.   Musculoskeletal: Positive for back pain. Negative for falls.   Neurological: Negative for speech change, focal weakness, loss of consciousness and headaches.  "      PHYSICAL EXAM  Vitals:    03/26/18 1500 03/26/18 1559 03/26/18 1600 03/26/18 1607   BP:       Pulse: 100 97 91 99   Resp:  18     Temp:       SpO2: 90% 98% 98% 100%   Weight:       Height:         Body mass index is 23.07 kg/m².  /72   Pulse 99   Temp 36.9 °C (98.4 °F)   Resp 18   Ht 1.5 m (4' 11.06\")   Wt 51.9 kg (114 lb 6.7 oz)   SpO2 100%   Breastfeeding? No   BMI 23.07 kg/m²    O2 therapy: Pulse Oximetry: 100 %, O2 (LPM): 4, O2 Delivery: Silicone Nasal Cannula    Physical Exam   Constitutional: She is oriented to person, place, and time.   Ill-appearing   HENT:   Head: Normocephalic and atraumatic.   Eyes: Pupils are equal, round, and reactive to light.   Cardiovascular: Regular rhythm.    tachycardic   Pulmonary/Chest: She has wheezes. She has rales.   Coarse breath sounds through out the lungs.   Abdominal: Soft. She exhibits no distension. There is no tenderness.   Musculoskeletal: She exhibits no tenderness.   Neurological: She is oriented to person, place, and time.   Skin: Skin is warm.           Respiratory:     Respiration: 18, Pulse Oximetry: 100 %, O2 Daily Delivery Respiratory : Silicone Nasal Cannula    Chest Tube Drains:          Invalid input(s): [id number]    HemoDynamics:  Pulse: 99, Heart Rate (Monitored): 99 Blood Pressure : 149/72, NIBP: 149/72        Fluids:    Date 03/26/18 0700 - 03/27/18 0659   Shift 6755-0045 6471-1992 0800-1708 24 Hour Total   I  N  T  A  K  E   P.O. 370 210  580      P.O. 370 210  580    Shift Total 370 210  580   O  U  T  P  U  T   Urine 650   650      Number of Times Voided 2 x   2 x      Void (ml) 650   650    Stool          Number of Times Stooled 0 x   0 x    Shift Total 650   650   NET -280 210  -70        Intake/Output Summary (Last 24 hours) at 03/21/18 1446  Last data filed at 03/21/18 1200   Gross per 24 hour   Intake              480 ml   Output              900 ml   Net             -420 ml          Body mass index is 23.07 " kg/m².    Recent Labs      18   0618   1044   SODIUM  135   < >  133*  135  133*   POTASSIUM  2.7*   < >  3.6  3.5*  3.9   CHLORIDE  95*   < >  88*  95*  88*   CO2  28   < >  36*  31  34*   BUN  16   < >  23*  20  27*   CREATININE  0.53   < >  0.69  0.65  0.72   MAGNESIUM  1.4*   --   1.8  1.5   --    PHOSPHORUS  2.7   --   4.3  4.4   --    CALCIUM  7.4*   < >  8.6  7.7*  8.7    < > = values in this interval not displayed.       GI/Nutrition:  Recent Labs      18   0618   1044   ALTSGPT  23   --   18  16   --    ASTSGOT  32   --   17  15   --    ALKPHOSPHAT  101*   --   90  75   --    TBILIRUBIN  0.4   --   0.5  0.4   --    GLUCOSE  179*   < >  234*  191*  224*    < > = values in this interval not displayed.       Heme:  Recent Labs      18   0636   RBC  4.17*  4.35  4.39   HEMOGLOBIN  12.7  13.7  13.9   HEMATOCRIT  38.5  40.3  41.0   PLATELETCT  334  380  312       Infectious Disease:  Temp  Av.8 °C (98.3 °F)  Min: 36.7 °C (98.1 °F)  Max: 36.9 °C (98.5 °F)  Recent Labs      18   0636   WBC  14.3*  15.1*  14.2*   NEUTSPOLYS  83.60*  87.70*  88.00*   LYMPHOCYTES  8.40*  5.90*  7.00*   MONOCYTES  5.00  4.60  3.00   EOSINOPHILS  0.00  0.00  0.00   BASOPHILS  0.20  0.30  0.20   ASTSGOT  32  17  15   ALTSGPT  23  18  16   ALKPHOSPHAT  101*  90  75   TBILIRUBIN  0.4  0.5  0.4       Meds:  • ipratropium-albuterol  3 mL     • ALPRAZolam  0.25 mg     • heparin  5,000 Units     • amLODIPine  5 mg     • labetalol  10 mg     • ondansetron  4 mg     • methylPREDNISolone  40 mg     • insulin regular  2-9 Units      And   • glucose 4 g  16 g      And   • dextrose 50%  25 mL     • Respiratory Care per Protocol       • tiotropium  1 Cap     • nicotine  7 mg     • QUEtiapine  25 mg     • furosemide  20 mg     • losartan  50 mg     •  oxyCODONE-acetaminophen  1 Tab     • potassium chloride SA  20 mEq     • hydrALAZINE  20 mg     • calcium carbonate  500 mg     • lidocaine viscous 2%  5 mL     • glycopyrrolate  1 mg          Procedures:  Right IJ central catheter 3/21    Imaging:  DX-CHEST-PORTABLE (1 VIEW)   Final Result      1.  Again seen interstitial infiltrates which may represent some residual interstitial edema.      2.  Bibasilar atelectasis.      DX-CHEST-PORTABLE (1 VIEW)   Final Result      Findings consistent with pulmonary edema, improved compared with 3/22.      DX-CHEST-PORTABLE (1 VIEW)   Final Result      Moderate diffuse interstitial opacities increased when compared to previous and consistent with edema or pneumonitis. No pneumothorax.   Ill-defined opacities left lung apex appears similar to recent findings. Continue follow-up.      ECHOCARDIOGRAM COMP W/O CONT   Final Result      CT-CTA CHEST PULMONARY ARTERY W/ RECONS   Final Result   Addendum 1 of 1   Right central venous catheter with tip in the mid SVC.      Final         1. No CT evidence of pulmonary embolism.      2. Groundglass and airspace opacities in the left lingula and left lower lobe. Small areas of peripheral groundglass opacities in the right lung as well. These are concerning for multifocal pneumonia. The largest area is in the left lower lobe.      3. Postsurgical change in the left upper lobe with nodular opacities. Comparison to prior study could be helpful for interval change.          Problem and Plan:    Septic shock (CMS-HCC)  - Resolved  - Blood, urine cx negative  - Finished course Unasyn    Hx COPD  - systemic steroids, switch to po   - Supplemental 02    Elevated troponin  - no ischemic changes on EKG   - Trop was 0.21 on admit. Trending down.  - Likely demand ischemia, treat lung pathology  - Monitor      COPD with acute exacerbation (CMS-HCC)  - 55 pack year tobacco history  - Continues to use tobacco  - Supplemental 02 PRN  - Unasyn course  finished 3/24  - IV steroids q8hr  - Duoneb scheduled  - IS, expectorants  - End stage, palliative saw today, referral to hospice placed    CAP (community acquired pneumonia)  - CT shows scattered groundglass opacities that may indicate pneumonia  - Finished course IV unasyn  - Supplemental 02 PRN      History of lung cancer  - S/p right upper lobectomy in 2008 during same surgery CABG was performed. Performed at [facility] in [facility].  - Not followed by oncology  - CT chest shows scattered opacities  - Continues to use tobacco      Hypothyroidism  - TSH WNL    - Continue home med     Severe episode of recurrent major depressive disorder, without psychotic features (CMS-HCC)  - Continue home medications      Diabetes mellitus type 2, controlled (CMS-HCC)  - Held home metformin  - ISS/hypoglycemic protocol inpatient and monitor glucose level  - Last Hb A1C 6.8      Acute hypoxemic respiratory failure (CMS-HCC)  - Hx COPD secondary to tobacco use, cancer s/p lobectomy  - Echo shows pulm HTN  - Now requiring supplemental 02  - Duoneb PRN  - IV abx finished, IV steroids, duoneb  - Respiratory protocol   - Refer to hospice    Electrolyte abnormality  - monitor and replete K and Mg, IV until Speech approves PO meds     Dysphagia  - Was on dysphagia 2 yesterday after FEES, made NPO overnight due to clinical worsening  - Waiting for Speech to re-evaluated before starting diet/PO meds     DISPO: ICU, transfer to floor    CODE STATUS: No chest compression, no defibrillations, no intubation    Quality Measures:  Salcido Catheter: No  DVT Prophylaxis: Heparin  Ulcer Prophylaxis: No  Antibiotics: Finished full course unasyn.  Lines: PIV, Right IJ central line

## 2018-03-26 NOTE — DISCHARGE PLANNING
Met with pt and family at bedside. Discussed pt dc'ing to a SNF on hospice. Provided SNF Choice sheet to family. They stated they will review the choices and inform the hospital.     Pt has Medicare and Medicaid. Palliative is following the pt. [facility] is also following the pt.

## 2018-03-26 NOTE — DISCHARGE SUMMARY
Internal Medicine Discharge Summary  Note Author: Shanika Roblero M.D.       Admit Date:  3/20/2018       Discharge Date:  3/29/2018    Service:   R Internal Medicine White Team  Attending Physician(s):   Dr. Jones    Senior Resident(s):   Dr. Fowler  Jose Resident(s):   Dr. Roblero      Primary Diagnosis:   Septic Shock (resolved)     Secondary Diagnoses:                  COPD with acute exacerbation (resolved)    CAP (community acquired pneumonia)  (resolved)    Acute hypoxemic respiratory failure  (resolved)    History of lung cancer     Severe episode of recurrent major depressive disorder, without psychotic features    Diabetes mellitus type 2, controlled     Hypothyroidism     Electrolyte abnormality     Dysphagia     Elevated troponin (resolved)    Hyponatremia    Lactic acidosis (resolved)      Hospital Summary (Brief Narrative):       77 year old female with a PMH of current tobacco use, COPD not on home O2, lung cancer s/p lobectomy, coronary artery disease s/p CABG presented to the ED on 3/20/2018 with complaints of SOB, cough productive of green sputum. Patient was seen at urgent care 3 days prior to admit and placed started on a Z-pack which did not relieve the patient's symptoms. Her symptoms continued to worsen and she returned to urgent care. She was found to be hypoxic on room air SpO2 in 70's and was sent to the ED here at [facility]. Patient was found to be hypotensive in the ED (69/50 on presentation) and after 1.6L IV fluid bolus per sepsis protocol her blood pressure remained low. At that time a central line in right IJ was placed for pressor therapy which she only required briefly in the ICU. Oxygen requirement's increased from 4 L nasal cannula to 4 L oxymask and lung auscultation revealed diffuse scattered wheezing. She was initiated on IV unasyn and azithromycin for presumed pulmonary source of infection and started on IV steroids for COPD exacerbation. Patient admitted to the  ICU for pressors and close observation. In the ICU, the patient had been intermittently tachycardic, worsened by her anxiety and required frequent ativan/xanax to help alleviate her symptoms. While in the ICU, she also experienced severe hemodynamic instability with a near cardiac arrest and her code status was updated to DNR/DNI. Patient remained critically ill with moderate respiratory distress secondary to her acute, severe COPD despite treatment while in the ICU. She has a history of lung cancer s/p lobectomy and CT-A showed scattered opacities that may have represented multifocal pneumonia and/or possible metastatic cancer (did not want to pursue a tissue diagnosis or treatment). She also had severe pulmonary HTN and afib/flutter. Lasix was initiated for mild fluid overload. Palliative medicine assessed patient on 3/24 with a family meeting on 3/25. The decision was made to proceed with hospice due to end-stage COPD and a referral was placed on 3/25.   On admission to medical floor, home medications for anxiety/depression were resumed. Patient tolerated this well. She was started on a prednisone taper as her respiratory symptoms improved.    She is medically stable for discharge to SNF.    Patient /Hospital Summary (Details -- Problem Oriented) :          Acute hypoxemic respiratory failure (CMS-HCC)   Assessment & Plan    Patient with  COPD secondary to tobacco use (not on home O2), cancer s/p lobectomy.   - Echo 3/21 showed pulm HTN  - No longer requiring oxygen  - IV Unasyn course completed on 3/24  - Patient received duonebs prn, albuterol prn, and RT protocol, and supplemental O2. IV steroids were changed to PO steroids and a taper was initiated  Plan:  - On discharge, we recommend completing the prednisone taper as follows starting on 3/30: 30 mg x 2 days, 20 mg x 3 days, 10 mg x 3 days, 5 mg x 2 days  - We recommend supplemental O2 as needed (1-2 L with ambulation currently)  - We recommend symbicort and  "spiriva inhalers on discharge         CAP (community acquired pneumonia)   Assessment & Plan    CT 3/21 showed scattered groundglass opacities that were suggestive of pneumonia. She finished course IV unasyn on 3/24        COPD with acute exacerbation (CMS-HCC)   Assessment & Plan    - 55 pack year tobacco history, current smoker  - End stage COPD, per palliative: hospice recommended  Plan:   - Please see \"acute hypoxemic respiratory failure\"        * Septic shock (CMS-HCC)   Assessment & Plan    - Resolved  - Hx COPD 2/2 tobacco, lung cancer s/p lobectomy 2008  - Not on home O2 therapy  - CT showed scattered groundglass opacities  - Blood, urine cx negative     Please see \"acute hypoxemic respiratory failure\"        Elevated troponin-resolved as of 3/25/2018   Assessment & Plan    - Negative for ischemic changes in the EKG, denied chest pain   - Trop was 0.21 on admission and trended down.  - Likely was due to demand ischemia from lung pathology        History of lung cancer   Assessment & Plan    - S/p right upper lobectomy in 2008 during same surgery CABG was performed. Performed at [facility] in [facility].  - Not followed by oncology  - CT chest 3/21 showed scattered opacities        Back pain   Assessment & Plan    Patient with h/o chronic back pain and joint pain 2/2 osteoporosis and OA on percocet at home  - Home dose resumed        Dysphagia   Assessment & Plan    - C/w dysphagia 1 diabetic diet        Electrolyte abnormality   Assessment & Plan    K and Mg were monitored and repleted        Hypothyroidism   Assessment & Plan    - TSH WNL    - Continue home med         Essential hypertension   Assessment & Plan    On losartan-hctz (50-12.5) and lasix 40 daily at home. Presented with hypotension due to sepsis, meds were initially held.  Lasix 20-40mg and losartan resumed 3/22, amlodipine 5added 3/24, BPs remained elevated 3/27, amlodipine changed to nifedipine 30  - BPs currently well " controlled    We recommend continuing with nifedipine 30 mg, lasix 20 mg daily, losartan 50 mg on discharge        Constipation-resolved as of 3/29/2018   Assessment & Plan    Patient with a h/o of constipation   After bowel protocol and fleet enema given 3/28, patient had a successful BM        Diabetes mellitus type 2, controlled (CMS-HCC)   Assessment & Plan    - Last Hb A1C 6.8 3/2018. Home metformin resumed 3/27 with ISS/hypoglycemic protocol inpatient and monitor glucose level  - We recommend continuing metformin on discharge        Severe episode of recurrent major depressive disorder, without psychotic features (CMS-HCC)   Assessment & Plan    Home meds include cymbalta BID, lamictal BID, abilify qpm,  xanax BID PRN  - Home meds resumed as above            Consultants:     Palliative Care    Procedures:        Central line 3/21 for hemodynamic instability in the setting of septic shock    Imaging/ Testing:      DX-CHEST-PORTABLE (1 VIEW)   Final Result      1.  Again seen interstitial infiltrates which may represent some residual interstitial edema.      2.  Bibasilar atelectasis.      DX-CHEST-PORTABLE (1 VIEW)   Final Result      Findings consistent with pulmonary edema, improved compared with 3/22.      DX-CHEST-PORTABLE (1 VIEW)   Final Result      Moderate diffuse interstitial opacities increased when compared to previous and consistent with edema or pneumonitis. No pneumothorax.   Ill-defined opacities left lung apex appears similar to recent findings. Continue follow-up.      ECHOCARDIOGRAM COMP W/O CONT   Final Result      CT-CTA CHEST PULMONARY ARTERY W/ RECONS   Final Result   Addendum 1 of 1   Right central venous catheter with tip in the mid SVC.      Final         1. No CT evidence of pulmonary embolism.      2. Groundglass and airspace opacities in the left lingula and left lower lobe. Small areas of peripheral groundglass opacities in the right lung as well. These are concerning for multifocal  pneumonia. The largest area is in the left lower lobe.      3. Postsurgical change in the left upper lobe with nodular opacities. Comparison to prior study could be helpful for interval change.          Discharge Medications:         Medication Reconciliation: Completed       Medication List      START taking these medications      Instructions   ALPRAZolam 0.25 MG Tabs  Commonly known as:  XANAX   Take 1 Tab by mouth 2 times a day as needed for Anxiety for up to 30 days.  Dose:  0.25 mg     budesonide-formoterol 160-4.5 MCG/ACT Aero  Commonly known as:  SYMBICORT   Inhale 2 Puffs by mouth 2 Times a Day.  Dose:  2 Puff     losartan 50 MG Tabs  Start taking on:  3/30/2018  Commonly known as:  COZAAR   Take 1 Tab by mouth every day.  Dose:  50 mg     NIFEdipine 30 MG CR tablet  Start taking on:  3/30/2018  Commonly known as:  ADALAT CC   Take 1 Tab by mouth every day.  Dose:  30 mg     predniSONE 10 MG Tabs  Start taking on:  3/30/2018  Commonly known as:  DELTASONE   Take 1 Tab by mouth every day for 10 days. Prednisone taper as follows 30mg on 3/30, 3/31 20mg on 4/1, 4/2, 4/3 10mg on 4/4, 4/5, 4/6 5mg on 4/7, 4/8  Dose:  10 mg        CHANGE how you take these medications      Instructions   furosemide 20 MG Tabs  Start taking on:  3/30/2018  What changed:  · medication strength  · how much to take  · when to take this  · reasons to take this  Commonly known as:  LASIX   Take 1 Tab by mouth every day.  Dose:  20 mg        CONTINUE taking these medications      Instructions   albuterol 108 (90 Base) MCG/ACT Aers inhalation aerosol   Inhale 1-2 Puffs by mouth every 6 hours as needed. Give albuterol that is patient or insurance preference please  Dose:  1-2 Puff     aripiprazole 5 MG tablet  Commonly known as:  ABILIFY   TAKE 1 TABLET BY MOUTH ONCE DAILY IN THE MORNING     aspirin 81 MG tablet   Take 81 mg by mouth every day.  Dose:  81 mg     DULoxetine 60 MG Cpep delayed-release capsule  Commonly known as:  CYMBALTA    Take 60 mg by mouth 2 times a day.  Dose:  60 mg     fluticasone 50 MCG/ACT nasal spray  Commonly known as:  FLONASE   SHAKE LIQUID AND USE 1 SPRAY IN EACH NOSTRIL TWICE DAILY     lamotrigine 200 MG tablet  Commonly known as:  LAMICTAL   TAKE 1 TABLET BY MOUTH TWICE DAILY     levothyroxine 100 MCG Tabs  Commonly known as:  SYNTHROID   Take 1 Tab by mouth Every morning on an empty stomach.  Dose:  88 mcg     losartan-hydrochlorothiazide 50-12.5 MG per tablet  Commonly known as:  HYZAAR   TAKE 1 TABLET BY MOUTH ONCE DAILY     metFORMIN 500 MG Tabs  Commonly known as:  GLUCOPHAGE   Take 500 mg by mouth 2 times a day, with meals.  Dose:  500 mg     oxyCODONE-acetaminophen  MG Tabs  Commonly known as:  PERCOCET-10   Take 1 Tab by mouth every four hours as needed for Severe Pain.  Dose:  1 Tab     SPIRIVA HANDIHALER 18 MCG Caps  Generic drug:  tiotropium   INHALE THE CONTENTS OF 1 CAPSULE BY MOUTH VIA HANDIHALER ONCE DAILY     VOLTAREN 1 % Gel  Generic drug:  Diclofenac Sodium   Apply 1 Application to skin as directed 3 times a day as needed. Apply thin layer 3 times a day to affected area Joints  Dose:  1 Application        STOP taking these medications    azithromycin 250 MG Tabs  Commonly known as:  ZITHROMAX     cilostazol 100 MG Tabs  Commonly known as:  PLETAL     LORazepam 0.5 MG Tabs  Commonly known as:  ATIVAN     Potassium Chloride CR 8 MEQ Cpcr capsule  Commonly known as:  MICRO-K     rosuvastatin 10 MG Tabs  Commonly known as:  CRESTOR                  Disposition:   SNF    Diet:  Full thin liquids ADA    Activity:  As tolerated    Instructions:        The patient was instructed to return to the ER in the event of worsening symptoms. I have counseled the patient on the importance of compliance and the patient has agreed to proceed with all medical recommendations and follow up plan indicated above.   The patient understands that all medications come with benefits and risks. Risks may include permanent  injury or death and these risks can be minimized with close reassessment and monitoring.        Primary Care Provider:  SIOBHAN Cao  Discharge summary faxed to primary care provider:  Completed  Copy of discharge summary given to the patient: Completed      Follow up appointment details :      Please follow up with your PCP in 1-2 weeks    Pending Studies:        None    Time spent on discharge day patient visit, preparing discharge paperwork and arranging for patient follow up.    Summary of follow up issues:   None    Discharge Time (Minutes) :    60  Hospital Course Type:  Inpatient Stay >2 midnights      Condition on Discharge    ______________________________________________________________________    Interval history/exam for day of discharge:     Stable    Vitals:    03/28/18 1735 03/28/18 1925 03/29/18 0420 03/29/18 0725   BP:  127/53 152/65 157/68   Pulse:  78 76 76   Resp:  18 18 20   Temp:  36.5 °C (97.7 °F) 36.4 °C (97.6 °F) 36.8 °C (98.2 °F)   SpO2: 92% 91% 91% 93%   Weight:       Height:         Weight/BMI: Body mass index is 23.07 kg/m².  Pulse Oximetry: 93 %, O2 (LPM): 2, O2 Delivery: None (Room Air)    General: NAD  CVS: RRR  PULM: CTAB    Most Recent Labs:    Lab Results   Component Value Date/Time    WBC 23.1 (H) 03/29/2018 04:51 AM    RBC 4.66 03/29/2018 04:51 AM    HEMOGLOBIN 14.5 03/29/2018 04:51 AM    HEMATOCRIT 43.7 03/29/2018 04:51 AM    MCV 93.8 03/29/2018 04:51 AM    MCH 31.1 03/29/2018 04:51 AM    MCHC 33.2 (L) 03/29/2018 04:51 AM    MPV 10.4 03/29/2018 04:51 AM    NEUTSPOLYS 72.00 03/29/2018 04:51 AM    LYMPHOCYTES 19.00 (L) 03/29/2018 04:51 AM    MONOCYTES 4.80 03/29/2018 04:51 AM    EOSINOPHILS 0.60 03/29/2018 04:51 AM    BASOPHILS 0.20 03/29/2018 04:51 AM      Lab Results   Component Value Date/Time    SODIUM 130 (L) 03/29/2018 04:51 AM    POTASSIUM 4.2 03/29/2018 04:51 AM    CHLORIDE 92 (L) 03/29/2018 04:51 AM    CO2 31 03/29/2018 04:51 AM    GLUCOSE 87 03/29/2018  04:51 AM    BUN 27 (H) 03/29/2018 04:51 AM    CREATININE 0.78 03/29/2018 04:51 AM    CREATININE 0.95 08/03/2010 12:00 AM    BUNCREATRAT 13 08/03/2010 12:00 AM    GLOMRATE 58 (L) 08/03/2010 12:00 AM      Lab Results   Component Value Date/Time    ALTSGPT 20 03/29/2018 04:51 AM    ASTSGOT 14 03/29/2018 04:51 AM    ALKPHOSPHAT 59 03/29/2018 04:51 AM    TBILIRUBIN 0.7 03/29/2018 04:51 AM    ALBUMIN 3.3 03/29/2018 04:51 AM    GLOBULIN 2.1 03/29/2018 04:51 AM    INR 1.02 03/21/2018 04:45 AM     Lab Results   Component Value Date/Time    PROTHROMBTM 13.1 03/21/2018 04:45 AM    INR 1.02 03/21/2018 04:45 AM

## 2018-03-26 NOTE — DOCUMENTATION QUERY
"DOCUMENTATION QUERY    PROVIDERS: Please select “Cosign w/ note”to reply to query.      To better represent the severity of illness of your patient, please review the following information and exercise your independent professional judgment in responding to this query.     Potassium of 2.7 is noted in the Lab Results on 3/24.  KDur has been given. Based upon the clinical findings, risk factors, and treatment, can a diagnosis be provided to support this finding and treatment?    • The patient has a diagnosis of Hypokalemia  • Unable to determine        The medical record reflects the following:   Clinical Findings 3/24 Potassium 2.7  3/25 [facility] ICU MD note: \"Electrolyte abnormality- monitor and replete K\"   Treatment Monitor daily BMP  KDur tablet daily   Risk Factors Septic shock  Hyponatremia  Diabetes mellitus  Lactic acidosis  Cachectic  Respiratory failure  COPD exacerbation   Location within medical record History and Physical, Progress Notes, Lab Results  and MAR     Thank you,   Russ Jolley RN BSN  Clinical   [phone] [facility] office  [phone] Cell phone          "

## 2018-03-26 NOTE — DOCUMENTATION QUERY
"DOCUMENTATION QUERY    PROVIDERS: Please select “Cosign w/ note”to reply to query.    To better represent the severity of illness of your patient, please review the following information and exercise your independent professional judgment in responding to this query. Please reference the information at the bottom of this query for clinical criteria to support malnutrition.    \"Protein calorie malnutrtion\" is documented in the [facility] ICU Progress Notes on 3/22. Based upon the clinical findings, risk factors, and treatment, can this diagnosis be further specified?     • Mild Protein Calorie Malnutrition  • Moderate Protein Calorie Malnutrition  • Severe Protein Calorie Malnutrition  • Cachexia  • Underweight  • Findings of no clinical significance  • Other explanation of clinical findings  • Unable to determine    The medical record reflects the following:   Clinical Findings 3/22 [facility] ICU MD note: \"She is cachectic with temporal wasting\"  3/22 SLP recommendation: Dysphagia ll/thins   Treatment SLP eval and treatment  FEES procedure  Boost for diet, and liquid diet   Risk Factors Septic shock  COPD exacerbation  Acute respiratory failure  Hyponatremia  Proteinuria  Cachectic  History lung cancer  Dysphagia   Location within medical record History and Physical and Progress Notes     Thank you,   Russ Jolley RN BSN  Clinical   [phone] [facility] office  [phone] Cell phone               "

## 2018-03-26 NOTE — THERAPY
"Speech Language Therapy dysphagia treatment completed.     Functional Status:  Patient was seen on this date for dysphagia treatment as patient was made a high follow up by primary SLP. She was seen with a full liquid meal tray and consisted of a creamy soup, pudding, and thin liquids via cup sips. No overt s/sx of aspiration and vocal quality remained clear throughout entire session. She independently took small, single sips and small bites. PO trials of soft chopped solids x1 were given and resulted in prolonged mastication and reported globus sensation. Patient c/o limited options with current diet and this SLP discussed with son and pt about changing diet with D1. At this time, patient time, recommend modify current diet to Dysphagia I/Thin Liquids given intermittent supervision.     Recommendations: change to D1/Thins     Plan of Care: Will benefit from Speech Therapy 3 times per week    Post-Acute Therapy: Discharge to a transitional care facility for continued skilled therapy services.    See \"Rehab Therapy-Acute\" Patient Summary Report for complete documentation.       "

## 2018-03-26 NOTE — PROGRESS NOTES
[facility] ICU Progress Note      Admit Date: 3/20/2018  ICU Day: 7    Resident(s): Pawan  Attending: JULIEN FELDMAN/ Dr. Hancock    Date & Time:   3/26/2018   9:57 AM       Patient ID:    Name:             Mia Hammond   YOB: 1940  Age:                 77 y.o.  female   MRN:               [id number]    ID:  77 year old female with a PMH of current tobacco use, COPD not on home 02, lung cancer s/p lobectomy, coronary artery disease s/p CABG presented to the ED on 3/20/2018 with complaints of SOB, cough productive of green sputum. Patient was seen at urgent care 3 days prior to admit and placed on azithromycin which did not relieve the patient's symptoms. Her symptoms continued to worsen and she returned to urgent care. She was found to be hypoxic on room air Sp02 in 70's and was sent to the ED here at [facility]. Patient was found to be hypotensive in the ED and after 1.6L IV fluid bolus per sepsis protocol her blood pressure remained low. At that time a central line in right IJ was placed for pressor therapy which she only required briefly in the ICU. Oxygen requirement's increased from 4 L nasal cannula to 4 L oxymask and lung auscultation revealed diffuse scattered wheezing. She was initiated on IV unasyn and azithromycin and started on IV steroids. Patient was placed in ICU for pressors and close observation. Patient has been intermittently tachycardic, worsened by her anxiety and has required frequent ativan/xanax to help alleviate her symptoms. During her ICU stay patient experienced  hemodynamic instability with a near cardiac arrest and her code status was updated to DNR/DNI. Patient remained critically ill with moderate respiratory distress secondary to her acute, severe COPD during her ICU stay despite treatment. She has a history of lung cancer s/p lobectomy and CT-A showed scattered opacities that may have represented multifocal pneumonia and/or metastatic cancer (did not want to  "pursue a tissue diagnosis or treatment). She also has severe pulmonary HTN and afib/flutter. She has been experiencing dysphagia and is currently NPO pending speech re-eval. Lasix was initiated for mild fluid overload. Palliative medicine assessed patient on 3/24 with a family meeting on 3/25. The decision was made to proceed with hospice and a referral was placed for [facility] on 3/25.     Consultants:  PMA: Dr. Hancock    Interval Events:  improving respiratory condition on 3 L NC   Anxious, xanax as needed   Full liquid diet after speech evaluation    Clear breath sounds, pending transfer from yesterday..bed availability   Abx course finished 3/24, continue IV steroids 40 mg methylprednisolone    Continue lasix, low dose 20 mg PO  Pending acceptance from hospice/[facility]  Repeat CXR and BMP then transfer to the floor      Review of Systems   Constitutional: Positive for malaise/fatigue. Negative for chills and fever.   Respiratory: Positive for cough, sputum production and shortness of breath. Negative for hemoptysis and wheezing.    Cardiovascular: Negative for chest pain, palpitations and leg swelling.   Gastrointestinal: Negative for nausea and vomiting.   Musculoskeletal: Positive for back pain. Negative for falls.   Neurological: Negative for speech change, focal weakness, loss of consciousness and headaches.       PHYSICAL EXAM  Vitals:    03/26/18 0400 03/26/18 0600 03/26/18 0800 03/26/18 0934   BP:       Pulse:   77 87   Resp:    18   Temp:       SpO2: 94% 90% 92% 97%   Weight:       Height:         Body mass index is 23.07 kg/m².  BP (!) 69/50   Pulse 87   Temp 36.9 °C (98.5 °F)   Resp 18   Ht 1.5 m (4' 11.06\")   Wt 51.9 kg (114 lb 6.7 oz)   SpO2 97%   Breastfeeding? No   BMI 23.07 kg/m²    O2 therapy: Pulse Oximetry: 97 %, O2 (LPM): 4, O2 Delivery: Silicone Nasal Cannula    Physical Exam   Constitutional: She is oriented to person, place, and time.   Improving, sitting on the chair " without any respiratory distress symptoms    HENT:   Head: Normocephalic and atraumatic.   Eyes: Pupils are equal, round, and reactive to light.   Cardiovascular: Regular rhythm.    tachycardic   Pulmonary/Chest: Effort normal. She has no wheezes. She has no rales.       Abdominal: Soft. She exhibits no distension. There is no tenderness. There is no rebound.   Musculoskeletal: She exhibits no tenderness.   Neurological: She is alert and oriented to person, place, and time. No cranial nerve deficit.   Skin: Skin is warm. No rash noted.     Respiratory:     Respiration: 18, Pulse Oximetry: 97 %, O2 Daily Delivery Respiratory : Silicone Nasal Cannula    Chest Tube Drains:          Invalid input(s): [id number]    HemoDynamics:  Pulse: 87, Heart Rate (Monitored): 99 NIBP: (!) 165/91        Fluids:    Date 03/26/18 0700 - 03/27/18 0659   Shift 0597-3258 0919-2204 1307-2571 24 Hour Total   I  N  T  A  K  E   Shift Total       O  U  T  P  U  T   Stool          Number of Times Stooled 0 x   0 x    Shift Total       NET            Intake/Output Summary (Last 24 hours) at 03/21/18 1446  Last data filed at 03/21/18 1200   Gross per 24 hour   Intake              480 ml   Output              900 ml   Net             -420 ml          Body mass index is 23.07 kg/m².    Recent Labs      03/24/18   0600  03/24/18   1800  03/25/18   0605  03/26/18   0636   SODIUM  135  137  133*  135   POTASSIUM  2.7*  3.2*  3.6  3.5*   CHLORIDE  95*  89*  88*  95*   CO2  28  38*  36*  31   BUN  16  20  23*  20   CREATININE  0.53  0.80  0.69  0.65   MAGNESIUM  1.4*   --   1.8  1.5   PHOSPHORUS  2.7   --   4.3  4.4   CALCIUM  7.4*  8.6  8.6  7.7*       GI/Nutrition:  Recent Labs      03/24/18   0600  03/24/18   1800  03/25/18   0605  03/26/18   0636   ALTSGPT  23   --   18  16   ASTSGOT  32   --   17  15   ALKPHOSPHAT  101*   --   90  75   TBILIRUBIN  0.4   --   0.5  0.4   GLUCOSE  179*  172*  234*  191*       Heme:  Recent Labs      03/24/18    0600  18   0605  18   0636   RBC  4.17*  4.35  4.39   HEMOGLOBIN  12.7  13.7  13.9   HEMATOCRIT  38.5  40.3  41.0   PLATELETCT  334  380  312       Infectious Disease:  Temp  Av.9 °C (98.4 °F)  Min: 36.8 °C (98.3 °F)  Max: 36.9 °C (98.5 °F)  Recent Labs      18   0600  18   0605  18   0636   WBC  14.3*  15.1*  14.2*   NEUTSPOLYS  83.60*  87.70*  88.00*   LYMPHOCYTES  8.40*  5.90*  7.00*   MONOCYTES  5.00  4.60  3.00   EOSINOPHILS  0.00  0.00  0.00   BASOPHILS  0.20  0.30  0.20   ASTSGOT  32  17  15   ALTSGPT  23  18  16   ALKPHOSPHAT  101*  90  75   TBILIRUBIN  0.4  0.5  0.4       Meds:  • ALPRAZolam  0.25 mg     • heparin  5,000 Units     • amLODIPine  5 mg     • labetalol  10 mg     • ondansetron  4 mg     • methylPREDNISolone  40 mg     • ipratropium-albuterol  3 mL     • insulin regular  2-9 Units      And   • glucose 4 g  16 g      And   • dextrose 50%  25 mL     • ipratropium-albuterol  3 mL     • budesonide  0.5 mg     • Respiratory Care per Protocol       • tiotropium  1 Cap     • nicotine  7 mg     • QUEtiapine  25 mg     • furosemide  20 mg     • losartan  50 mg     • oxyCODONE-acetaminophen  1 Tab     • potassium chloride SA  20 mEq     • hydrALAZINE  20 mg     • calcium carbonate  500 mg     • lidocaine viscous 2%  5 mL     • glycopyrrolate  1 mg          Procedures:  Right IJ central catheter 3/21    Imaging:  DX-CHEST-PORTABLE (1 VIEW)   Final Result      Findings consistent with pulmonary edema, improved compared with 3/22.      DX-CHEST-PORTABLE (1 VIEW)   Final Result      Moderate diffuse interstitial opacities increased when compared to previous and consistent with edema or pneumonitis. No pneumothorax.   Ill-defined opacities left lung apex appears similar to recent findings. Continue follow-up.      ECHOCARDIOGRAM COMP W/O CONT   Final Result      CT-CTA CHEST PULMONARY ARTERY W/ RECONS   Final Result   Addendum 1 of 1   Right central venous catheter with tip  in the mid SVC.      Final         1. No CT evidence of pulmonary embolism.      2. Groundglass and airspace opacities in the left lingula and left lower lobe. Small areas of peripheral groundglass opacities in the right lung as well. These are concerning for multifocal pneumonia. The largest area is in the left lower lobe.      3. Postsurgical change in the left upper lobe with nodular opacities. Comparison to prior study could be helpful for interval change.          Problem and Plan:  * Septic shock (CMS-HCC)- (present on admission)   Assessment & Plan    - Resolved  - Secondary to CAP ---- CT shows scattered groundglass opacities  - Blood, urine cx negative  - Finished course unasyn        Acute hypoxemic respiratory failure (CMS-HCC)- (present on admission)   Assessment & Plan    - Hx COPD secondary to tobacco use, no home oxygen, history of lung cancer s/p lobectomy  - Echo shows pulm HTN  - Now requiring supplemental 02  - Duoneb PRN  - IV abx finished, IV steroids, duoneb  - Respiratory protocol   - pending acceptance from hospice for end stage COPD, Pulmonary hypertension         CAP (community acquired pneumonia)- (present on admission)   Assessment & Plan    - CT shows scattered groundglass opacities that may indicate pneumonia  - Finished course IV unasyn  - on oxygen NC           COPD with acute exacerbation (CMS-HCC)- (present on admission)   Assessment & Plan    - 55 pack year tobacco history/ tobacco  - Supplemental 02    - Unasyn course finished 3/24  - IV steroids methylprednisolone 40 mg TID  - Duoneb scheduled  - IS, expectorants        History of lung cancer- (present on admission)   Assessment & Plan    - S/p right upper lobectomy in 2008 during same surgery CABG was performed. Performed at [facility] in [facility]. Active smoking  - Not followed by oncology  - CT chest shows scattered opacities         Dysphagia   Assessment & Plan    -  Speech recommend full thin liquid diet and  speech eval 3/week        Electrolyte abnormality   Assessment & Plan    - replete as needed         Hypothyroidism- (present on admission)   Assessment & Plan    - TSH WNL    - Continue home med         Diabetes mellitus type 2, controlled (CMS-[facility])- (present on admission)   Assessment & Plan    - Held home metformin  - ISS/hypoglycemic protocol inpatient and monitor glucose level  - Last Hb A1C 6.8          Severe episode of recurrent major depressive disorder, without psychotic features (CMS-[facility])- (present on admission)   Assessment & Plan    - Continue home medications              DISPO: ICU, transfer to floor    CODE STATUS:  DNR      Quality Measures:  Salcido Catheter: No  DVT Prophylaxis: Heparin  Ulcer Prophylaxis: No  Antibiotics: None /Finished full course unasyn.  Lines: PIV, Right IJ central line

## 2018-03-27 LAB
ALBUMIN SERPL BCP-MCNC: 2.1 G/DL (ref 3.2–4.9)
ALBUMIN/GLOB SERPL: 1 G/DL
ALP SERPL-CCNC: 42 U/L (ref 30–99)
ALT SERPL-CCNC: 12 U/L (ref 2–50)
ANION GAP SERPL CALC-SCNC: 8 MMOL/L (ref 0–11.9)
AST SERPL-CCNC: 14 U/L (ref 12–45)
BASOPHILS # BLD AUTO: 0.1 % (ref 0–1.8)
BASOPHILS # BLD: 0.02 K/UL (ref 0–0.12)
BILIRUB SERPL-MCNC: 0.4 MG/DL (ref 0.1–1.5)
BUN SERPL-MCNC: 24 MG/DL (ref 8–22)
CALCIUM SERPL-MCNC: 6.6 MG/DL (ref 8.5–10.5)
CHLORIDE SERPL-SCNC: 104 MMOL/L (ref 96–112)
CO2 SERPL-SCNC: 24 MMOL/L (ref 20–33)
CREAT SERPL-MCNC: 0.46 MG/DL (ref 0.5–1.4)
EOSINOPHIL # BLD AUTO: 0 K/UL (ref 0–0.51)
EOSINOPHIL NFR BLD: 0 % (ref 0–6.9)
ERYTHROCYTE [DISTWIDTH] IN BLOOD BY AUTOMATED COUNT: 43.6 FL (ref 35.9–50)
GLOBULIN SER CALC-MCNC: 2.1 G/DL (ref 1.9–3.5)
GLUCOSE BLD-MCNC: 128 MG/DL (ref 65–99)
GLUCOSE BLD-MCNC: 166 MG/DL (ref 65–99)
GLUCOSE BLD-MCNC: 228 MG/DL (ref 65–99)
GLUCOSE BLD-MCNC: 283 MG/DL (ref 65–99)
GLUCOSE SERPL-MCNC: 158 MG/DL (ref 65–99)
HCT VFR BLD AUTO: 32.8 % (ref 37–47)
HGB BLD-MCNC: 10.7 G/DL (ref 12–16)
IMM GRANULOCYTES # BLD AUTO: 0.35 K/UL (ref 0–0.11)
IMM GRANULOCYTES NFR BLD AUTO: 2.3 % (ref 0–0.9)
LYMPHOCYTES # BLD AUTO: 1.07 K/UL (ref 1–4.8)
LYMPHOCYTES NFR BLD: 6.9 % (ref 22–41)
MAGNESIUM SERPL-MCNC: 1.3 MG/DL (ref 1.5–2.5)
MCH RBC QN AUTO: 31.2 PG (ref 27–33)
MCHC RBC AUTO-ENTMCNC: 32.6 G/DL (ref 33.6–35)
MCV RBC AUTO: 95.6 FL (ref 81.4–97.8)
MONOCYTES # BLD AUTO: 0.57 K/UL (ref 0–0.85)
MONOCYTES NFR BLD AUTO: 3.7 % (ref 0–13.4)
NEUTROPHILS # BLD AUTO: 13.46 K/UL (ref 2–7.15)
NEUTROPHILS NFR BLD: 87 % (ref 44–72)
NRBC # BLD AUTO: 0 K/UL
NRBC BLD-RTO: 0 /100 WBC
PHOSPHATE SERPL-MCNC: 2.7 MG/DL (ref 2.5–4.5)
PLATELET # BLD AUTO: 295 K/UL (ref 164–446)
PMV BLD AUTO: 9.5 FL (ref 9–12.9)
POTASSIUM SERPL-SCNC: 3.3 MMOL/L (ref 3.6–5.5)
PROT SERPL-MCNC: 4.2 G/DL (ref 6–8.2)
RBC # BLD AUTO: 3.43 M/UL (ref 4.2–5.4)
SODIUM SERPL-SCNC: 136 MMOL/L (ref 135–145)
WBC # BLD AUTO: 15.5 K/UL (ref 4.8–10.8)

## 2018-03-27 PROCEDURE — G8979 MOBILITY GOAL STATUS: HCPCS | Mod: CI

## 2018-03-27 PROCEDURE — 84100 ASSAY OF PHOSPHORUS: CPT

## 2018-03-27 PROCEDURE — A9270 NON-COVERED ITEM OR SERVICE: HCPCS | Performed by: INTERNAL MEDICINE

## 2018-03-27 PROCEDURE — 700111 HCHG RX REV CODE 636 W/ 250 OVERRIDE (IP): Performed by: INTERNAL MEDICINE

## 2018-03-27 PROCEDURE — 700102 HCHG RX REV CODE 250 W/ 637 OVERRIDE(OP): Performed by: INTERNAL MEDICINE

## 2018-03-27 PROCEDURE — 85025 COMPLETE CBC W/AUTO DIFF WBC: CPT

## 2018-03-27 PROCEDURE — 82962 GLUCOSE BLOOD TEST: CPT

## 2018-03-27 PROCEDURE — 94760 N-INVAS EAR/PLS OXIMETRY 1: CPT

## 2018-03-27 PROCEDURE — 770006 HCHG ROOM/CARE - MED/SURG/GYN SEMI*

## 2018-03-27 PROCEDURE — 97162 PT EVAL MOD COMPLEX 30 MIN: CPT

## 2018-03-27 PROCEDURE — 97165 OT EVAL LOW COMPLEX 30 MIN: CPT

## 2018-03-27 PROCEDURE — G8988 SELF CARE GOAL STATUS: HCPCS | Mod: CI

## 2018-03-27 PROCEDURE — 80053 COMPREHEN METABOLIC PANEL: CPT

## 2018-03-27 PROCEDURE — 700101 HCHG RX REV CODE 250: Performed by: INTERNAL MEDICINE

## 2018-03-27 PROCEDURE — G8987 SELF CARE CURRENT STATUS: HCPCS | Mod: CJ

## 2018-03-27 PROCEDURE — 94640 AIRWAY INHALATION TREATMENT: CPT

## 2018-03-27 PROCEDURE — 83735 ASSAY OF MAGNESIUM: CPT

## 2018-03-27 PROCEDURE — G8978 MOBILITY CURRENT STATUS: HCPCS | Mod: CJ

## 2018-03-27 RX ORDER — POLYETHYLENE GLYCOL 3350 17 G/17G
1 POWDER, FOR SOLUTION ORAL
Status: DISCONTINUED | OUTPATIENT
Start: 2018-03-27 | End: 2018-03-29 | Stop reason: HOSPADM

## 2018-03-27 RX ORDER — LEVOTHYROXINE SODIUM 88 UG/1
88 TABLET ORAL
Status: DISCONTINUED | OUTPATIENT
Start: 2018-03-27 | End: 2018-03-29 | Stop reason: HOSPADM

## 2018-03-27 RX ORDER — NIFEDIPINE 30 MG/1
30 TABLET, EXTENDED RELEASE ORAL
Status: DISCONTINUED | OUTPATIENT
Start: 2018-03-27 | End: 2018-03-29 | Stop reason: HOSPADM

## 2018-03-27 RX ORDER — BISACODYL 10 MG
10 SUPPOSITORY, RECTAL RECTAL
Status: DISCONTINUED | OUTPATIENT
Start: 2018-03-27 | End: 2018-03-29 | Stop reason: HOSPADM

## 2018-03-27 RX ORDER — POTASSIUM CHLORIDE 20 MEQ/1
40 TABLET, EXTENDED RELEASE ORAL ONCE
Status: COMPLETED | OUTPATIENT
Start: 2018-03-27 | End: 2018-03-27

## 2018-03-27 RX ORDER — TIOTROPIUM BROMIDE 18 UG/1
1 CAPSULE ORAL; RESPIRATORY (INHALATION) DAILY
Status: DISCONTINUED | OUTPATIENT
Start: 2018-03-28 | End: 2018-03-29 | Stop reason: HOSPADM

## 2018-03-27 RX ORDER — LAMOTRIGINE 100 MG/1
200 TABLET ORAL DAILY
Status: DISCONTINUED | OUTPATIENT
Start: 2018-03-27 | End: 2018-03-28

## 2018-03-27 RX ORDER — ARIPIPRAZOLE 10 MG/1
5 TABLET ORAL DAILY
Status: DISCONTINUED | OUTPATIENT
Start: 2018-03-27 | End: 2018-03-29 | Stop reason: HOSPADM

## 2018-03-27 RX ORDER — AMOXICILLIN 250 MG
2 CAPSULE ORAL 2 TIMES DAILY
Status: DISCONTINUED | OUTPATIENT
Start: 2018-03-27 | End: 2018-03-29 | Stop reason: HOSPADM

## 2018-03-27 RX ORDER — PREDNISONE 20 MG/1
40 TABLET ORAL DAILY
Status: DISCONTINUED | OUTPATIENT
Start: 2018-03-28 | End: 2018-03-28

## 2018-03-27 RX ORDER — MAGNESIUM SULFATE HEPTAHYDRATE 40 MG/ML
4 INJECTION, SOLUTION INTRAVENOUS ONCE
Status: COMPLETED | OUTPATIENT
Start: 2018-03-27 | End: 2018-03-27

## 2018-03-27 RX ADMIN — NIFEDIPINE 30 MG: 30 TABLET, FILM COATED, EXTENDED RELEASE ORAL at 08:33

## 2018-03-27 RX ADMIN — INSULIN HUMAN 2 UNITS: 100 INJECTION, SOLUTION PARENTERAL at 18:08

## 2018-03-27 RX ADMIN — HEPARIN SODIUM 5000 UNITS: 5000 INJECTION, SOLUTION INTRAVENOUS; SUBCUTANEOUS at 22:36

## 2018-03-27 RX ADMIN — METFORMIN HYDROCHLORIDE 500 MG: 500 TABLET, FILM COATED ORAL at 18:06

## 2018-03-27 RX ADMIN — METHYLPREDNISOLONE SODIUM SUCCINATE 40 MG: 40 INJECTION, POWDER, FOR SOLUTION INTRAMUSCULAR; INTRAVENOUS at 05:56

## 2018-03-27 RX ADMIN — OXYCODONE HYDROCHLORIDE AND ACETAMINOPHEN 1 TABLET: 5; 325 TABLET ORAL at 02:17

## 2018-03-27 RX ADMIN — HEPARIN SODIUM 5000 UNITS: 5000 INJECTION, SOLUTION INTRAVENOUS; SUBCUTANEOUS at 05:56

## 2018-03-27 RX ADMIN — LOSARTAN POTASSIUM 50 MG: 50 TABLET, FILM COATED ORAL at 10:24

## 2018-03-27 RX ADMIN — OXYCODONE HYDROCHLORIDE AND ACETAMINOPHEN 1 TABLET: 5; 325 TABLET ORAL at 22:40

## 2018-03-27 RX ADMIN — METHYLPREDNISOLONE SODIUM SUCCINATE 40 MG: 40 INJECTION, POWDER, FOR SOLUTION INTRAMUSCULAR; INTRAVENOUS at 14:16

## 2018-03-27 RX ADMIN — HYDRALAZINE HYDROCHLORIDE 20 MG: 20 INJECTION INTRAMUSCULAR; INTRAVENOUS at 08:37

## 2018-03-27 RX ADMIN — STANDARDIZED SENNA CONCENTRATE AND DOCUSATE SODIUM 2 TABLET: 8.6; 5 TABLET, FILM COATED ORAL at 22:36

## 2018-03-27 RX ADMIN — OXYCODONE HYDROCHLORIDE AND ACETAMINOPHEN 1 TABLET: 5; 325 TABLET ORAL at 06:05

## 2018-03-27 RX ADMIN — INSULIN HUMAN 5 UNITS: 100 INJECTION, SOLUTION PARENTERAL at 11:33

## 2018-03-27 RX ADMIN — QUETIAPINE FUMARATE 25 MG: 25 TABLET ORAL at 22:36

## 2018-03-27 RX ADMIN — OXYCODONE HYDROCHLORIDE AND ACETAMINOPHEN 1 TABLET: 5; 325 TABLET ORAL at 05:55

## 2018-03-27 RX ADMIN — FUROSEMIDE 20 MG: 40 TABLET ORAL at 08:33

## 2018-03-27 RX ADMIN — POTASSIUM CHLORIDE 20 MEQ: 1500 TABLET, EXTENDED RELEASE ORAL at 08:33

## 2018-03-27 RX ADMIN — IPRATROPIUM BROMIDE AND ALBUTEROL SULFATE 3 ML: .5; 3 SOLUTION RESPIRATORY (INHALATION) at 21:01

## 2018-03-27 RX ADMIN — ARIPIPRAZOLE 5 MG: 10 TABLET ORAL at 11:30

## 2018-03-27 RX ADMIN — MAGNESIUM SULFATE IN WATER 4 G: 40 INJECTION, SOLUTION INTRAVENOUS at 09:36

## 2018-03-27 RX ADMIN — OXYCODONE HYDROCHLORIDE AND ACETAMINOPHEN 1 TABLET: 5; 325 TABLET ORAL at 15:02

## 2018-03-27 RX ADMIN — OXYCODONE HYDROCHLORIDE AND ACETAMINOPHEN 1 TABLET: 5; 325 TABLET ORAL at 10:46

## 2018-03-27 RX ADMIN — STANDARDIZED SENNA CONCENTRATE AND DOCUSATE SODIUM 2 TABLET: 8.6; 5 TABLET, FILM COATED ORAL at 11:30

## 2018-03-27 RX ADMIN — NICOTINE 7 MG: 7 PATCH, EXTENDED RELEASE TRANSDERMAL at 05:56

## 2018-03-27 RX ADMIN — POTASSIUM CHLORIDE 40 MEQ: 1500 TABLET, EXTENDED RELEASE ORAL at 11:35

## 2018-03-27 RX ADMIN — INSULIN HUMAN 3 UNITS: 100 INJECTION, SOLUTION PARENTERAL at 22:34

## 2018-03-27 RX ADMIN — TIOTROPIUM BROMIDE 1 CAPSULE: 18 CAPSULE ORAL; RESPIRATORY (INHALATION) at 10:57

## 2018-03-27 RX ADMIN — LEVOTHYROXINE SODIUM 88 MCG: 88 TABLET ORAL at 12:15

## 2018-03-27 RX ADMIN — LAMOTRIGINE 200 MG: 100 TABLET ORAL at 12:15

## 2018-03-27 RX ADMIN — HEPARIN SODIUM 5000 UNITS: 5000 INJECTION, SOLUTION INTRAVENOUS; SUBCUTANEOUS at 14:16

## 2018-03-27 RX ADMIN — ALPRAZOLAM 0.25 MG: 0.25 TABLET ORAL at 14:26

## 2018-03-27 ASSESSMENT — COGNITIVE AND FUNCTIONAL STATUS - GENERAL
SUGGESTED CMS G CODE MODIFIER MOBILITY: CK
DAILY ACTIVITIY SCORE: 21
STANDING UP FROM CHAIR USING ARMS: A LITTLE
HELP NEEDED FOR BATHING: A LITTLE
TURNING FROM BACK TO SIDE WHILE IN FLAT BAD: A LITTLE
TOILETING: A LITTLE
MOBILITY SCORE: 15
DRESSING REGULAR LOWER BODY CLOTHING: A LITTLE
SUGGESTED CMS G CODE MODIFIER DAILY ACTIVITY: CJ
WALKING IN HOSPITAL ROOM: A LITTLE
MOVING TO AND FROM BED TO CHAIR: UNABLE
CLIMB 3 TO 5 STEPS WITH RAILING: A LITTLE
MOVING FROM LYING ON BACK TO SITTING ON SIDE OF FLAT BED: A LOT

## 2018-03-27 ASSESSMENT — PAIN SCALES - GENERAL
PAINLEVEL_OUTOF10: 7
PAINLEVEL_OUTOF10: 6
PAINLEVEL_OUTOF10: 7
PAINLEVEL_OUTOF10: 7
PAINLEVEL_OUTOF10: 5
PAINLEVEL_OUTOF10: 4
PAINLEVEL_OUTOF10: 9
PAINLEVEL_OUTOF10: 4

## 2018-03-27 ASSESSMENT — PATIENT HEALTH QUESTIONNAIRE - PHQ9
2. FEELING DOWN, DEPRESSED, IRRITABLE, OR HOPELESS: NOT AT ALL
1. LITTLE INTEREST OR PLEASURE IN DOING THINGS: NOT AT ALL
SUM OF ALL RESPONSES TO PHQ9 QUESTIONS 1 AND 2: 0

## 2018-03-27 ASSESSMENT — ENCOUNTER SYMPTOMS
LOSS OF CONSCIOUSNESS: 0
WHEEZING: 1
FALLS: 0
SPEECH CHANGE: 0
CHILLS: 0
BACK PAIN: 1
WHEEZING: 0
PALPITATIONS: 0
FOCAL WEAKNESS: 0
NAUSEA: 0
VOMITING: 0
FEVER: 0
SPUTUM PRODUCTION: 1
SHORTNESS OF BREATH: 1
HEMOPTYSIS: 0
COUGH: 1
HEADACHES: 0

## 2018-03-27 ASSESSMENT — GAIT ASSESSMENTS
ASSISTIVE DEVICE: FRONT WHEEL WALKER
GAIT LEVEL OF ASSIST: CONTACT GUARD ASSIST
DEVIATION: DECREASED BASE OF SUPPORT;DECREASED HEEL STRIKE;DECREASED TOE OFF;OTHER (COMMENT)
DISTANCE (FEET): 20

## 2018-03-27 ASSESSMENT — ACTIVITIES OF DAILY LIVING (ADL): TOILETING: INDEPENDENT

## 2018-03-27 NOTE — ASSESSMENT & PLAN NOTE
On losartan-hctz (50-12.5) and lasix 40 daily at home. Presented with hypotension due to sepsis, meds were initially held.  Lasix 20-40mg and losartan resumed 3/22, amlodipine 5added 3/24, BPs remained elevated 3/27, amlodipine changed to nifedipine 30  - BPs currently well controlled    We recommend continuing with nifedipine 30 mg, lasix 20 mg daily, losartan 50 mg on discharge

## 2018-03-27 NOTE — CARE PLAN
Problem: Safety  Goal: Will remain free from falls    Intervention: Implement fall precautions  Call light and bedside table within reach, bed in lowest locked position.  Bed in front of nursing station      Problem: Pain Management  Goal: Pain level will decrease to patient's comfort goal    Intervention: Follow pain managment plan developed in collaboration with patient and Interdisciplinary Team  Use of non-pharmacological measures to decrease pain and discomfort.  PRN medication as indicated per 0-10 pain scale.  Encouraged pt to verbalize pain.

## 2018-03-27 NOTE — THERAPY
"Physical Therapy Evaluation completed.   Transfers: Sit to Stand: Contact Guard Assist with FWW  Gait: Level Of Assist: Contact Guard Assist with Front-Wheel Walker       Plan of Care: Will benefit from Physical Therapy 3 times per week  Discharge Recommendations: Equipment: Will Continue to Assess for Equipment Needs. See below    Pt presents to PT with impaired endurance, balance, gait, muscle performance and general locomotion associated with recent deconditioning and medical co-morbidities. She is able to demonstrate short distance ambulation with FWW with CGA though requires 2 standing rest breaks 2' to SOB/DU. She reports a hx of falls with and without FWW and has a RLE valgus knee deformity. She will benefit from continued skilled acute PT and would recommend continued skilled PT/placement prior to medical d/c to home given current objective findings, age, PLOF, co-morbidities, and limited social supports with current level needed (as pt reports her home family situatoin is not suitable for return to home, though does have 2hrs paid caregivers daily). Will continue to visit and note pt is motivated for return to OF as able.     See \"Rehab Therapy-Acute\" Patient Summary Report for complete documentation.     "

## 2018-03-27 NOTE — CARE PLAN
Problem: Skin Integrity  Goal: Risk for impaired skin integrity will decrease    Intervention: Assess risk factors for impaired skin integrity and/or pressure ulcers  Mepilex placed on bony prominence on back      Problem: Mobility  Goal: Risk for activity intolerance will decrease    Intervention: Encourage patient to increase activity level in collaboration with Interdisciplinary Team  Pt ambulating to chair and commode

## 2018-03-27 NOTE — PROGRESS NOTES
[facility] Progress Note      Admit Date: 3/20/2018  ICU Day: 8    Resident(s): Pawan  Attending: JULIEN FELDMAN/ Dr. Hancock    Date & Time:   3/27/2018   6:39 AM       Patient ID:    Name:             Mia Hammond   YOB: 1940  Age:                 77 y.o.  female   MRN:               [id number]    ID:  77 year old female with a PMH of current tobacco use, COPD not on home 02, lung cancer s/p lobectomy, coronary artery disease s/p CABG presented to the ED on 3/20/2018 with complaints of SOB, cough productive of green sputum. Patient was seen at urgent care 3 days prior to admit and placed on azithromycin which did not relieve the patient's symptoms. Her symptoms continued to worsen and she returned to urgent care. She was found to be hypoxic on room air Sp02 in 70's and was sent to the ED here at [facility]. Patient was found to be hypotensive in the ED and after 1.6L IV fluid bolus per sepsis protocol her blood pressure remained low. At that time a central line in right IJ was placed for pressor therapy which she only required briefly in the ICU. Oxygen requirement's increased from 4 L nasal cannula to 4 L oxymask and lung auscultation revealed diffuse scattered wheezing. She was initiated on IV unasyn and azithromycin and started on IV steroids. Patient was placed in ICU for pressors and close observation. Patient has been intermittently tachycardic, worsened by her anxiety and has required frequent ativan/xanax to help alleviate her symptoms. During her ICU stay patient experienced  hemodynamic instability with a near cardiac arrest and her code status was updated to DNR/DNI. Patient remained critically ill with moderate respiratory distress secondary to her acute, severe COPD during her ICU stay despite treatment. She has a history of lung cancer s/p lobectomy and CT-A showed scattered opacities that may have represented multifocal pneumonia and/or metastatic cancer (did not want to  "pursue a tissue diagnosis or treatment). She also has severe pulmonary HTN and afib/flutter. She has been experiencing dysphagia and is currently NPO pending speech re-eval. Lasix was initiated for mild fluid overload. Palliative medicine assessed patient on 3/24 with a family meeting on 3/25. The decision was made to proceed with hospice and a referral was placed for [facility] on 3/25.     Consultants:  PMA: Dr. Hancock    Interval Events:  improving respiratory condition on 2-3 L NC/ continue lasix   Pending acceptance from hospice/[facility]/ no bed available since yesterday/ pending transfer to floor  improving CXR and BNP from yesterday  Low mag 1.3 received 4 gram  Low potassium replete  Bowel protocol  Resumed levothyroxine  Resume home psych meds  Dc Seroquel   WBC high on steroid      Review of Systems   Constitutional: Positive for malaise/fatigue. Negative for chills and fever.   Respiratory: Positive for cough, sputum production and shortness of breath. Negative for hemoptysis and wheezing.    Cardiovascular: Negative for chest pain, palpitations and leg swelling.   Gastrointestinal: Negative for nausea and vomiting.   Musculoskeletal: Positive for back pain. Negative for falls.   Neurological: Negative for speech change, focal weakness, loss of consciousness and headaches.       PHYSICAL EXAM  Vitals:    03/27/18 0300 03/27/18 0400 03/27/18 0500 03/27/18 0600   BP:       Pulse: 74 72 75 78   Resp:       Temp:       SpO2: 92%   92%   Weight:       Height:         Body mass index is 23.07 kg/m².  /72   Pulse 78   Temp 36.9 °C (98.5 °F)   Resp 18   Ht 1.5 m (4' 11.06\")   Wt 51.9 kg (114 lb 6.7 oz)   SpO2 92%   Breastfeeding? No   BMI 23.07 kg/m²   O2 therapy: Pulse Oximetry: 92 %, O2 (LPM): 2, O2 Delivery: Silicone Nasal Cannula    Physical Exam   Constitutional: She is oriented to person, place, and time.   Improving, no respiratory distress symptoms    HENT:   Head: Normocephalic " and atraumatic.   Eyes: Pupils are equal, round, and reactive to light.   Cardiovascular: Regular rhythm.    tachycardic   Pulmonary/Chest: Effort normal. She has no wheezes. She has no rales.       Abdominal: Soft. She exhibits no distension. There is no tenderness. There is no rebound.   Musculoskeletal: She exhibits no tenderness.   Neurological: She is alert and oriented to person, place, and time. No cranial nerve deficit.   Skin: Skin is warm. No rash noted.     Respiratory:     Respiration: 18, Pulse Oximetry: 92 %, O2 Daily Delivery Respiratory : Silicone Nasal Cannula    Chest Tube Drains:          Invalid input(s): [id number]    HemoDynamics:  Pulse: 78 Blood Pressure : 149/72, NIBP: (!) 168/77        Fluids:        Intake/Output Summary (Last 24 hours) at 18 1446  Last data filed at 18 1200   Gross per 24 hour   Intake              480 ml   Output              900 ml   Net             -420 ml          Body mass index is 23.07 kg/m².    Recent Labs      18   0618   0636  18   1044   SODIUM  133*  135  133*   POTASSIUM  3.6  3.5*  3.9   CHLORIDE  88*  95*  88*   CO2  36*  31  34*   BUN  23*  20  27*   CREATININE  0.69  0.65  0.72   MAGNESIUM  1.8  1.5   --    PHOSPHORUS  4.3  4.4   --    CALCIUM  8.6  7.7*  8.7       GI/Nutrition:  Recent Labs      18   0618   0636  18   1044   ALTSGPT  18  16   --    ASTSGOT  17  15   --    ALKPHOSPHAT  90  75   --    TBILIRUBIN  0.5  0.4   --    GLUCOSE  234*  191*  224*       Heme:  Recent Labs      18   0618   0636   RBC  4.35  4.39   HEMOGLOBIN  13.7  13.9   HEMATOCRIT  40.3  41.0   PLATELETCT  380  312       Infectious Disease:  Temp  Av.8 °C (98.3 °F)  Min: 36.7 °C (98.1 °F)  Max: 36.9 °C (98.5 °F)  Recent Labs      18   0605  18   0636   WBC  15.1*  14.2*   NEUTSPOLYS  87.70*  88.00*   LYMPHOCYTES  5.90*  7.00*   MONOCYTES  4.60  3.00   EOSINOPHILS  0.00  0.00   BASOPHILS   0.30  0.20   ASTSGOT  17  15   ALTSGPT  18  16   ALKPHOSPHAT  90  75   TBILIRUBIN  0.5  0.4       Meds:  • NIFEdipine SR  30 mg     • ipratropium-albuterol  3 mL     • ALPRAZolam  0.25 mg     • heparin  5,000 Units     • labetalol  10 mg     • ondansetron  4 mg     • methylPREDNISolone  40 mg     • insulin regular  2-9 Units      And   • glucose 4 g  16 g      And   • dextrose 50%  25 mL     • Respiratory Care per Protocol       • tiotropium  1 Cap     • nicotine  7 mg     • QUEtiapine  25 mg     • furosemide  20 mg     • losartan  50 mg     • oxyCODONE-acetaminophen  1 Tab     • potassium chloride SA  20 mEq     • hydrALAZINE  20 mg     • calcium carbonate  500 mg     • lidocaine viscous 2%  5 mL     • glycopyrrolate  1 mg          Procedures:  Right IJ central catheter 3/21    Imaging:  DX-CHEST-PORTABLE (1 VIEW)   Final Result      1.  Again seen interstitial infiltrates which may represent some residual interstitial edema.      2.  Bibasilar atelectasis.      DX-CHEST-PORTABLE (1 VIEW)   Final Result      Findings consistent with pulmonary edema, improved compared with 3/22.      DX-CHEST-PORTABLE (1 VIEW)   Final Result      Moderate diffuse interstitial opacities increased when compared to previous and consistent with edema or pneumonitis. No pneumothorax.   Ill-defined opacities left lung apex appears similar to recent findings. Continue follow-up.      ECHOCARDIOGRAM COMP W/O CONT   Final Result      CT-CTA CHEST PULMONARY ARTERY W/ RECONS   Final Result   Addendum 1 of 1   Right central venous catheter with tip in the mid SVC.      Final         1. No CT evidence of pulmonary embolism.      2. Groundglass and airspace opacities in the left lingula and left lower lobe. Small areas of peripheral groundglass opacities in the right lung as well. These are concerning for multifocal pneumonia. The largest area is in the left lower lobe.      3. Postsurgical change in the left upper lobe with nodular opacities.  Comparison to prior study could be helpful for interval change.          Problem and Plan:      * Septic shock (CMS-HCC)- (present on admission)   Assessment & Plan    - Resolved  - Secondary to CAP ---- admission CT shows scattered groundglass opacities  - Blood, urine cx negative  - Finished course unasyn        Acute hypoxemic respiratory failure (CMS-HCC)- (present on admission)   Assessment & Plan    - Hx COPD secondary to tobacco use, no home oxygen, history of lung cancer s/p lobectomy  - Echo shows pulm HTN  - Now requiring supplemental 02  - Duoneb PRN  - IV abx finished, IV steroids, duoneb  - Respiratory protocol   - pending acceptance from hospice for end stage COPD, Pulmonary hypertension         CAP (community acquired pneumonia)- (present on admission)   Assessment & Plan    - CT shows scattered groundglass opacities that may indicate pneumonia  - Finished course IV unasyn  - on oxygen NC           COPD with acute exacerbation (CMS-HCC)- (present on admission)   Assessment & Plan    - 55 pack year tobacco history/ tobacco  - Supplemental 02    - Unasyn course finished 3/24  - IV steroids methylprednisolone 40 mg TID  - Duoneb scheduled  - IS, expectorants        History of lung cancer- (present on admission)   Assessment & Plan    - S/p right upper lobectomy in 2008 during same surgery CABG was performed. Performed at [facility] in [facility]. Active smoking  - Not followed by oncology  - CT chest shows scattered opacities         Dysphagia   Assessment & Plan    -  Speech recommend full thin liquid diet and speech eval 3/week        Electrolyte abnormality   Assessment & Plan    - replete as needed         Hypothyroidism- (present on admission)   Assessment & Plan    - TSH WNL    - Continue home med         Diabetes mellitus type 2, controlled (CMS-HCC)- (present on admission)   Assessment & Plan    - Held home metformin  - ISS/hypoglycemic protocol inpatient and monitor glucose level  -  Last Hb A1C 6.8          Severe episode of recurrent major depressive disorder, without psychotic features (CMS-HCC)- (present on admission)   Assessment & Plan    - Continue home medications          # Hypertension   - Switched amlodipine 5 mg to nifedipine xl 30 mg  - Continue losartan 50 mg   - Continue with lasix   - Continue with PRN hydralazine and labetalol         DISPO: ICU, transfer to floor when bed available     CODE STATUS:  DNR      Quality Measures:  Salcido Catheter: No  DVT Prophylaxis: Heparin  Ulcer Prophylaxis: No  Antibiotics: None /Finished full course unasyn.  Lines: PIV, Right IJ central line

## 2018-03-27 NOTE — PROGRESS NOTES
Pt arrived in the unit. Pt on 2L O2. Denies SOB. C/O back, hip and legs. Medicated pt as ordered. Family at bedside. POc discussed. All needs met. Call light within reach. All needs met.

## 2018-03-28 PROBLEM — K59.00 CONSTIPATION: Status: ACTIVE | Noted: 2018-03-28

## 2018-03-28 PROBLEM — M54.9 BACK PAIN: Status: ACTIVE | Noted: 2018-03-28

## 2018-03-28 LAB
ALBUMIN SERPL BCP-MCNC: 3.1 G/DL (ref 3.2–4.9)
ALBUMIN/GLOB SERPL: 1.3 G/DL
ALP SERPL-CCNC: 65 U/L (ref 30–99)
ALT SERPL-CCNC: 18 U/L (ref 2–50)
ANION GAP SERPL CALC-SCNC: 7 MMOL/L (ref 0–11.9)
AST SERPL-CCNC: 16 U/L (ref 12–45)
BASOPHILS # BLD AUTO: 0.2 % (ref 0–1.8)
BASOPHILS # BLD: 0.05 K/UL (ref 0–0.12)
BILIRUB SERPL-MCNC: 0.5 MG/DL (ref 0.1–1.5)
BUN SERPL-MCNC: 28 MG/DL (ref 8–22)
CALCIUM SERPL-MCNC: 8.9 MG/DL (ref 8.5–10.5)
CHLORIDE SERPL-SCNC: 93 MMOL/L (ref 96–112)
CO2 SERPL-SCNC: 32 MMOL/L (ref 20–33)
CREAT SERPL-MCNC: 0.68 MG/DL (ref 0.5–1.4)
EOSINOPHIL # BLD AUTO: 0.04 K/UL (ref 0–0.51)
EOSINOPHIL NFR BLD: 0.2 % (ref 0–6.9)
ERYTHROCYTE [DISTWIDTH] IN BLOOD BY AUTOMATED COUNT: 43.7 FL (ref 35.9–50)
GLOBULIN SER CALC-MCNC: 2.4 G/DL (ref 1.9–3.5)
GLUCOSE BLD-MCNC: 105 MG/DL (ref 65–99)
GLUCOSE BLD-MCNC: 175 MG/DL (ref 65–99)
GLUCOSE BLD-MCNC: 180 MG/DL (ref 65–99)
GLUCOSE BLD-MCNC: 203 MG/DL (ref 65–99)
GLUCOSE SERPL-MCNC: 119 MG/DL (ref 65–99)
HCT VFR BLD AUTO: 43.2 % (ref 37–47)
HGB BLD-MCNC: 14.3 G/DL (ref 12–16)
IMM GRANULOCYTES # BLD AUTO: 0.69 K/UL (ref 0–0.11)
IMM GRANULOCYTES NFR BLD AUTO: 3 % (ref 0–0.9)
LYMPHOCYTES # BLD AUTO: 3.28 K/UL (ref 1–4.8)
LYMPHOCYTES NFR BLD: 14.5 % (ref 22–41)
MAGNESIUM SERPL-MCNC: 2 MG/DL (ref 1.5–2.5)
MCH RBC QN AUTO: 31 PG (ref 27–33)
MCHC RBC AUTO-ENTMCNC: 33.1 G/DL (ref 33.6–35)
MCV RBC AUTO: 93.5 FL (ref 81.4–97.8)
MONOCYTES # BLD AUTO: 1.05 K/UL (ref 0–0.85)
MONOCYTES NFR BLD AUTO: 4.6 % (ref 0–13.4)
NEUTROPHILS # BLD AUTO: 17.56 K/UL (ref 2–7.15)
NEUTROPHILS NFR BLD: 77.5 % (ref 44–72)
NRBC # BLD AUTO: 0 K/UL
NRBC BLD-RTO: 0 /100 WBC
PHOSPHATE SERPL-MCNC: 3.3 MG/DL (ref 2.5–4.5)
PLATELET # BLD AUTO: 395 K/UL (ref 164–446)
PMV BLD AUTO: 9.5 FL (ref 9–12.9)
POTASSIUM SERPL-SCNC: 4 MMOL/L (ref 3.6–5.5)
PROT SERPL-MCNC: 5.5 G/DL (ref 6–8.2)
RBC # BLD AUTO: 4.62 M/UL (ref 4.2–5.4)
SODIUM SERPL-SCNC: 132 MMOL/L (ref 135–145)
WBC # BLD AUTO: 22.7 K/UL (ref 4.8–10.8)

## 2018-03-28 PROCEDURE — A9270 NON-COVERED ITEM OR SERVICE: HCPCS | Performed by: INTERNAL MEDICINE

## 2018-03-28 PROCEDURE — 700102 HCHG RX REV CODE 250 W/ 637 OVERRIDE(OP): Performed by: STUDENT IN AN ORGANIZED HEALTH CARE EDUCATION/TRAINING PROGRAM

## 2018-03-28 PROCEDURE — 700101 HCHG RX REV CODE 250: Performed by: STUDENT IN AN ORGANIZED HEALTH CARE EDUCATION/TRAINING PROGRAM

## 2018-03-28 PROCEDURE — 700102 HCHG RX REV CODE 250 W/ 637 OVERRIDE(OP): Performed by: INTERNAL MEDICINE

## 2018-03-28 PROCEDURE — 84100 ASSAY OF PHOSPHORUS: CPT

## 2018-03-28 PROCEDURE — 80053 COMPREHEN METABOLIC PANEL: CPT

## 2018-03-28 PROCEDURE — A9270 NON-COVERED ITEM OR SERVICE: HCPCS | Performed by: STUDENT IN AN ORGANIZED HEALTH CARE EDUCATION/TRAINING PROGRAM

## 2018-03-28 PROCEDURE — 83735 ASSAY OF MAGNESIUM: CPT

## 2018-03-28 PROCEDURE — 700111 HCHG RX REV CODE 636 W/ 250 OVERRIDE (IP): Performed by: INTERNAL MEDICINE

## 2018-03-28 PROCEDURE — 770006 HCHG ROOM/CARE - MED/SURG/GYN SEMI*

## 2018-03-28 PROCEDURE — 86480 TB TEST CELL IMMUN MEASURE: CPT

## 2018-03-28 PROCEDURE — 85025 COMPLETE CBC W/AUTO DIFF WBC: CPT

## 2018-03-28 PROCEDURE — 36415 COLL VENOUS BLD VENIPUNCTURE: CPT

## 2018-03-28 PROCEDURE — 82962 GLUCOSE BLOOD TEST: CPT

## 2018-03-28 PROCEDURE — 99232 SBSQ HOSP IP/OBS MODERATE 35: CPT | Mod: GC | Performed by: INTERNAL MEDICINE

## 2018-03-28 RX ORDER — DULOXETIN HYDROCHLORIDE 30 MG/1
60 CAPSULE, DELAYED RELEASE ORAL DAILY
Status: DISCONTINUED | OUTPATIENT
Start: 2018-03-28 | End: 2018-03-29 | Stop reason: HOSPADM

## 2018-03-28 RX ORDER — LAMOTRIGINE 100 MG/1
200 TABLET ORAL TWICE DAILY
Status: DISCONTINUED | OUTPATIENT
Start: 2018-03-28 | End: 2018-03-29 | Stop reason: HOSPADM

## 2018-03-28 RX ORDER — ENEMA 19; 7 G/133ML; G/133ML
1 ENEMA RECTAL ONCE
Status: COMPLETED | OUTPATIENT
Start: 2018-03-28 | End: 2018-03-28

## 2018-03-28 RX ORDER — BUDESONIDE AND FORMOTEROL FUMARATE DIHYDRATE 160; 4.5 UG/1; UG/1
2 AEROSOL RESPIRATORY (INHALATION)
Status: DISCONTINUED | OUTPATIENT
Start: 2018-03-28 | End: 2018-03-29

## 2018-03-28 RX ORDER — PREDNISONE 10 MG/1
30 TABLET ORAL DAILY
Status: DISCONTINUED | OUTPATIENT
Start: 2018-03-29 | End: 2018-03-29 | Stop reason: HOSPADM

## 2018-03-28 RX ADMIN — POTASSIUM CHLORIDE 20 MEQ: 1500 TABLET, EXTENDED RELEASE ORAL at 08:20

## 2018-03-28 RX ADMIN — PREDNISONE 40 MG: 20 TABLET ORAL at 08:19

## 2018-03-28 RX ADMIN — DULOXETINE HYDROCHLORIDE 60 MG: 30 CAPSULE, DELAYED RELEASE ORAL at 10:39

## 2018-03-28 RX ADMIN — OXYCODONE HYDROCHLORIDE AND ACETAMINOPHEN 1 TABLET: 5; 325 TABLET ORAL at 06:12

## 2018-03-28 RX ADMIN — STANDARDIZED SENNA CONCENTRATE AND DOCUSATE SODIUM 2 TABLET: 8.6; 5 TABLET, FILM COATED ORAL at 08:19

## 2018-03-28 RX ADMIN — INSULIN HUMAN 2 UNITS: 100 INJECTION, SOLUTION PARENTERAL at 22:44

## 2018-03-28 RX ADMIN — INSULIN HUMAN 3 UNITS: 100 INJECTION, SOLUTION PARENTERAL at 13:13

## 2018-03-28 RX ADMIN — METFORMIN HYDROCHLORIDE 500 MG: 500 TABLET, FILM COATED ORAL at 08:15

## 2018-03-28 RX ADMIN — SODIUM PHOSPHATE, DIBASIC AND SODIUM PHOSPHATE, MONOBASIC 133 ML: 7; 19 ENEMA RECTAL at 09:45

## 2018-03-28 RX ADMIN — ARIPIPRAZOLE 5 MG: 10 TABLET ORAL at 08:14

## 2018-03-28 RX ADMIN — LEVOTHYROXINE SODIUM 88 MCG: 88 TABLET ORAL at 06:12

## 2018-03-28 RX ADMIN — HEPARIN SODIUM 5000 UNITS: 5000 INJECTION, SOLUTION INTRAVENOUS; SUBCUTANEOUS at 22:40

## 2018-03-28 RX ADMIN — TIOTROPIUM BROMIDE 1 CAPSULE: 18 CAPSULE ORAL; RESPIRATORY (INHALATION) at 08:16

## 2018-03-28 RX ADMIN — OXYCODONE HYDROCHLORIDE AND ACETAMINOPHEN 1 TABLET: 5; 325 TABLET ORAL at 10:37

## 2018-03-28 RX ADMIN — OXYCODONE HYDROCHLORIDE AND ACETAMINOPHEN 1 TABLET: 5; 325 TABLET ORAL at 17:57

## 2018-03-28 RX ADMIN — LAMOTRIGINE 200 MG: 100 TABLET ORAL at 22:40

## 2018-03-28 RX ADMIN — LOSARTAN POTASSIUM 50 MG: 50 TABLET, FILM COATED ORAL at 08:14

## 2018-03-28 RX ADMIN — HEPARIN SODIUM 5000 UNITS: 5000 INJECTION, SOLUTION INTRAVENOUS; SUBCUTANEOUS at 06:12

## 2018-03-28 RX ADMIN — NICOTINE 7 MG: 7 PATCH, EXTENDED RELEASE TRANSDERMAL at 06:12

## 2018-03-28 RX ADMIN — LAMOTRIGINE 200 MG: 100 TABLET ORAL at 08:14

## 2018-03-28 RX ADMIN — HEPARIN SODIUM 5000 UNITS: 5000 INJECTION, SOLUTION INTRAVENOUS; SUBCUTANEOUS at 14:43

## 2018-03-28 RX ADMIN — NIFEDIPINE 30 MG: 30 TABLET, FILM COATED, EXTENDED RELEASE ORAL at 08:15

## 2018-03-28 RX ADMIN — OXYCODONE HYDROCHLORIDE AND ACETAMINOPHEN 1 TABLET: 5; 325 TABLET ORAL at 22:41

## 2018-03-28 RX ADMIN — INSULIN HUMAN 2 UNITS: 100 INJECTION, SOLUTION PARENTERAL at 18:09

## 2018-03-28 RX ADMIN — MAGNESIUM HYDROXIDE 30 ML: 400 SUSPENSION ORAL at 08:19

## 2018-03-28 RX ADMIN — METFORMIN HYDROCHLORIDE 500 MG: 500 TABLET, FILM COATED ORAL at 17:57

## 2018-03-28 RX ADMIN — FUROSEMIDE 20 MG: 40 TABLET ORAL at 08:14

## 2018-03-28 ASSESSMENT — PAIN SCALES - GENERAL
PAINLEVEL_OUTOF10: 3
PAINLEVEL_OUTOF10: 6
PAINLEVEL_OUTOF10: 9
PAINLEVEL_OUTOF10: 9
PAINLEVEL_OUTOF10: 6
PAINLEVEL_OUTOF10: 8

## 2018-03-28 ASSESSMENT — ENCOUNTER SYMPTOMS
FEVER: 0
SHORTNESS OF BREATH: 0
ABDOMINAL PAIN: 0
CONSTIPATION: 1
SEIZURES: 0
SENSORY CHANGE: 0
BACK PAIN: 1
COUGH: 1
DOUBLE VISION: 0
BLURRED VISION: 0
FOCAL WEAKNESS: 0
EYE REDNESS: 0
WHEEZING: 0
NAUSEA: 0
NERVOUS/ANXIOUS: 1
DEPRESSION: 1
HEARTBURN: 0
PALPITATIONS: 0
CHILLS: 0
SPUTUM PRODUCTION: 1

## 2018-03-28 NOTE — CARE PLAN
Problem: Respiratory:  Goal: Respiratory status will improve    Intervention: Educate and encourage incentive spirometry usage  Encourage use of IS

## 2018-03-28 NOTE — PROGRESS NOTES
Critical Care  Progress Note      Admit Date: 3/20/2018  ICU Day: 8        Date & Time:   3/27/2018   5:04 PM       Patient ID:    Name:             Mia Hammond   YOB: 1940  Age:                 77 y.o.  female   MRN:               [id number]    ID:  77 year old female with a PMH of current tobacco use, COPD not on home 02 lung cancer s/p lobectomy, coronary artery disease s/p CABG presented to the ED on 3/20/2018 with complaints of SOB, cough productive of green sputum. Patient was seen at urgent care 3 days prior to admit and placed on azithromycin which did not relieve the patient's symptoms. Her symptoms continued to worsen and returned to urgent care. She was found to be hypoxic on room air Sp02 in 70's and was sent to the ED here at [facility]. Patient was found to be hypotensive in the ED and after 1.6L IV fluid bolus per sepsis protocol her blood pressure remained low. At that time a central line in right IJ was placed for pressor therapy which she only required briefly in the ICU. Oxygen requirement's increased from 4 L nasal cannula to 4 L oxymask and lung auscultation revealed diffuse scattered wheezing. She was initiated on IV unasyn and azithromycin and started on IV steroids. Patient was placed in ICU for pressors and close observation.    Consultants:  PMA    Interval Events:    Patient stayed in the ICU because there was no bad available in the floor  Chest x-ray done to yesterday showed some improvement of increased interstitial marking  BNP normalized  Onnasal cannula 2 L/m O2 saturation around 95%  No adverse events overnight              Review of Systems   Constitutional: Positive for malaise/fatigue. Negative for chills and fever.   Respiratory: Positive for cough, sputum production, shortness of breath and wheezing. Negative for hemoptysis.    Cardiovascular: Negative for chest pain, palpitations and leg swelling.   Gastrointestinal: Negative for nausea and vomiting.  "  Musculoskeletal: Positive for back pain. Negative for falls.   Neurological: Negative for speech change, focal weakness, loss of consciousness and headaches.       PHYSICAL EXAM  Vitals:    03/27/18 1100 03/27/18 1200 03/27/18 1400 03/27/18 1549   BP:   159/70 (!) 164/59   Pulse: 100 88 99 91   Resp:   20 18   Temp:   37.1 °C (98.8 °F) 36.6 °C (97.8 °F)   SpO2: (!) 72%  94% 91%   Weight:       Height:         Body mass index is 23.07 kg/m².  BP (!) 164/59   Pulse 91   Temp 36.6 °C (97.8 °F)   Resp 18   Ht 1.5 m (4' 11.06\")   Wt 51.9 kg (114 lb 6.7 oz)   SpO2 91%   Breastfeeding? No   BMI 23.07 kg/m²   O2 therapy: Pulse Oximetry: 91 %, O2 (LPM): 2, O2 Delivery: Silicone Nasal Cannula    Physical Exam   Constitutional: She is oriented to person, place, and time.   Ill-appearing   HENT:   Head: Normocephalic and atraumatic.   Eyes: Pupils are equal, round, and reactive to light.   Cardiovascular: Regular rhythm.    tachycardic   Pulmonary/Chest: She has wheezes. She has rales.   Coarse breath sounds through out the lungs.   Abdominal: Soft. She exhibits no distension. There is no tenderness.   Musculoskeletal: She exhibits no tenderness.   Neurological: She is oriented to person, place, and time.   Skin: Skin is warm.           Respiratory:     Respiration: 18, Pulse Oximetry: 91 %, O2 Daily Delivery Respiratory : Silicone Nasal Cannula    Chest Tube Drains:          Invalid input(s): [id number]    HemoDynamics:  Pulse: 91 Blood Pressure : (!) 164/59, NIBP: 145/63        Fluids:    Date 03/27/18 0700 - 03/28/18 0659   Shift 9422-1446 5852-1890 8602-6210 24 Hour Total   I  N  T  A  K  E   P.O. 590   590      P.O. 590   590    I.V. 100   100      Magnesium Sulfate Volume 100   100    Shift Total 690   690   O  U  T  P  U  T   Urine 1575   1575      Number of Times Voided 5 x   5 x      Void (ml) 1575   1575    Shift Total 1575   1575   NET -885   -885        Intake/Output Summary (Last 24 hours) at 03/21/18 " 1446  Last data filed at 18 1200   Gross per 24 hour   Intake              480 ml   Output              900 ml   Net             -420 ml          Body mass index is 23.07 kg/m².    Recent Labs      18   0605  18   0636  18   1044  18   0605   SODIUM  133*  135  133*  136   POTASSIUM  3.6  3.5*  3.9  3.3*   CHLORIDE  88*  95*  88*  104   CO2  36*  31  34*  24   BUN  23*  20  27*  24*   CREATININE  0.69  0.65  0.72  0.46*   MAGNESIUM  1.8  1.5   --   1.3*   PHOSPHORUS  4.3  4.4   --   2.7   CALCIUM  8.6  7.7*  8.7  6.6*       GI/Nutrition:  Recent Labs      18   0605  18   0636  18   1044  18   0605   ALTSGPT  18  16   --   12   ASTSGOT  17  15   --   14   ALKPHOSPHAT  90  75   --   42   TBILIRUBIN  0.5  0.4   --   0.4   GLUCOSE  234*  191*  224*  158*       Heme:  Recent Labs      18   0605  18   0636  18   0605   RBC  4.35  4.39  3.43*   HEMOGLOBIN  13.7  13.9  10.7*   HEMATOCRIT  40.3  41.0  32.8*   PLATELETCT  380  312  295       Infectious Disease:  Temp  Av.7 °C (98.1 °F)  Min: 36.3 °C (97.4 °F)  Max: 37.1 °C (98.8 °F)  Recent Labs      18   0605  18   0636  18   0605   WBC  15.1*  14.2*  15.5*   NEUTSPOLYS  87.70*  88.00*  87.00*   LYMPHOCYTES  5.90*  7.00*  6.90*   MONOCYTES  4.60  3.00  3.70   EOSINOPHILS  0.00  0.00  0.00   BASOPHILS  0.30  0.20  0.10   ASTSGOT  17  15  14   ALTSGPT  18  16  12   ALKPHOSPHAT  90  75  42   TBILIRUBIN  0.5  0.4  0.4       Meds:  • NIFEdipine SR  30 mg     • lamotrigine  200 mg     • levothyroxine  88 mcg     • ARIPiprazole  5 mg     • senna-docusate  2 Tab      And   • polyethylene glycol/lytes  1 Packet      And   • magnesium hydroxide  30 mL      And   • bisacodyl  10 mg     • [START ON 3/28/2018] tiotropium  1 Cap     • metFORMIN  500 mg     • ipratropium-albuterol  3 mL     • ALPRAZolam  0.25 mg     • heparin  5,000 Units     • labetalol  10 mg     • ondansetron  4 mg     •  methylPREDNISolone  40 mg     • insulin regular  2-9 Units      And   • glucose 4 g  16 g      And   • dextrose 50%  25 mL     • Respiratory Care per Protocol       • nicotine  7 mg     • QUEtiapine  25 mg     • furosemide  20 mg     • losartan  50 mg     • oxyCODONE-acetaminophen  1 Tab     • potassium chloride SA  20 mEq     • hydrALAZINE  20 mg     • calcium carbonate  500 mg     • lidocaine viscous 2%  5 mL     • glycopyrrolate  1 mg          Procedures:  Right IJ central catheter 3/21    Imaging:  DX-CHEST-PORTABLE (1 VIEW)   Final Result      1.  Again seen interstitial infiltrates which may represent some residual interstitial edema.      2.  Bibasilar atelectasis.      DX-CHEST-PORTABLE (1 VIEW)   Final Result      Findings consistent with pulmonary edema, improved compared with 3/22.      DX-CHEST-PORTABLE (1 VIEW)   Final Result      Moderate diffuse interstitial opacities increased when compared to previous and consistent with edema or pneumonitis. No pneumothorax.   Ill-defined opacities left lung apex appears similar to recent findings. Continue follow-up.      ECHOCARDIOGRAM COMP W/O CONT   Final Result      CT-CTA CHEST PULMONARY ARTERY W/ RECONS   Final Result   Addendum 1 of 1   Right central venous catheter with tip in the mid SVC.      Final         1. No CT evidence of pulmonary embolism.      2. Groundglass and airspace opacities in the left lingula and left lower lobe. Small areas of peripheral groundglass opacities in the right lung as well. These are concerning for multifocal pneumonia. The largest area is in the left lower lobe.      3. Postsurgical change in the left upper lobe with nodular opacities. Comparison to prior study could be helpful for interval change.          Problem and Plan:    Septic shock (CMS-HCC)  - Resolved  - Blood, urine cx negative  - Finished course Unasyn    Hx COPD  - systemic steroids, switch to po   - Supplemental 02    Elevated troponin  - no ischemic changes  on EKG   -  Trending down.  - Likely demand ischemia      COPD with acute exacerbation (CMS-HCC)  - 55 pack year tobacco history  - Continues to use tobacco  - Supplemental 02 PRN  - Unasyn course finished 3/24  - IV steroids q8hr, changed to by mouth prednisone  - Duoneb scheduled  - IS, expectorants    CAP (community acquired pneumonia)  - CT shows scattered groundglass opacities that may indicate pneumonia  - Finished course IV unasyn  - Supplemental 02 PRN      History of lung cancer  - S/p right upper lobectomy in 2008 during same surgery CABG was performed. Performed at [facility] in [facility].  - Not followed by oncology  - CT chest shows scattered opacities  - Continues to use tobacco      Hypothyroidism  - TSH WNL    - Continue home med     Severe episode of recurrent major depressive disorder, without psychotic features (CMS-HCC)  - Continue home medications      Diabetes mellitus type 2, controlled (CMS-HCC)  - Held home metformin  - ISS/hypoglycemic protocol inpatient and monitor glucose level  - Last Hb A1C 6.8      Acute hypoxemic respiratory failure (CMS-HCC)  - Hx COPD secondary to tobacco use, cancer s/p lobectomy  - Echo shows pulm HTN  - Now requiring supplemental 02  - Duoneb PRN  - IV abx finished, IV steroids, duoneb  - Respiratory protocol   - Refer to hospice    Electrolyte abnormality  - monitor and replete K and Mg, IV until Speech approves PO meds     Will have pulmonary service to follow  Resume her home medications prior to transfer to floor. Including his psychiatric medications and thyroid replacement therapy    DISPO: ICU, transfer to floor    CODE STATUS: No chest compression, no defibrillations, no intubation    Quality Measures:  Salcido Catheter: No  DVT Prophylaxis: Heparin  Ulcer Prophylaxis: No  Antibiotics: Finished full course unasyn.  Lines: PIV, Right IJ central line

## 2018-03-28 NOTE — CARE PLAN
Problem: Mobility  Goal: Risk for activity intolerance will decrease    Intervention: Assess and monitor signs of activity intolerance   03/28/18 1231   OTHER   Assistance / Tolerance * * Assistance;Standby Assist;Tolerates Well;General Weakness

## 2018-03-28 NOTE — THERAPY
"Occupational Therapy Evaluation completed.   Functional Status:  Min/CGA with ADLs and txfs  Plan of Care: Will benefit from Occupational Therapy 3 times per week  Discharge Recommendations:  Equipment: Will Continue to Assess for Equipment Needs. Post-acute therapy Discharge to a transitional care facility for continued therapy services.    See \"Rehab Therapy-Acute\" Patient Summary Report for complete documentation.    "

## 2018-03-28 NOTE — ASSESSMENT & PLAN NOTE
Patient with a h/o of constipation   After bowel protocol and fleet enema given 3/28, patient had a successful BM

## 2018-03-28 NOTE — PROGRESS NOTES
"       Internal Medicine Interval Note  Note Author: Shanika Roblero M.D.     Name Mia Hammond Francisco     1940   Age/Sex 77 y.o. female   MRN [id number]   Code Status DNR     After 5PM or if no immediate response to page, please call for cross-coverage  Attending/Team: Dr. Jones/Roya See Patient List for primary contact information  Call [phone] to page    1st Call - Day Intern (R1):   Dr Roblero 2nd Call - Day Sr. Resident (R2/R3):   Dr Fowler         Reason for interval visit  (Principal Problem)   Septic shock (CMS-[facility])    Interval Problem Daily Status Update  (24 hours)   - Patient transferred to medical floor on 3/27  - patient feels much improvement, no longer requiring oxygen  - feels she is coming down with a \"head cold\" with cough and runny nose x 1 day, cough suppressant ordered  - No BM since 3/25 normally constipated with BM q 4-5 days, fleet enema with bowel protocol ordered  - Patient complains that she has been feeling much anxiety as she is not being given her anxiety meds the way she was taking them at home (cymbalta BID, lamictal BID, abilify qpm) - will resume home meds   - Patient is also anxious as she has been bed-bound due to fall risk, uses a walker at baseline, PT ordered  - BPs have improved with current meds  - Patient wheezing improved, will taper prednisone to 30 mg tomorrow  - SW to touch base with family (Jose Orosco - daughter- [phone]; Praveen- son- [phone]) regarding placement in SNF with hospice, SNF referral placed    Review of Systems   Constitutional: Negative for chills and fever.   HENT: Positive for congestion. Negative for hearing loss.    Eyes: Negative for blurred vision, double vision and redness.   Respiratory: Positive for cough (for one day) and sputum production. Negative for shortness of breath and wheezing.    Cardiovascular: Negative for chest pain and palpitations.   Gastrointestinal: Positive for constipation. Negative for abdominal " pain, heartburn and nausea.   Genitourinary: Negative for dysuria and frequency.   Musculoskeletal: Positive for back pain (chronic) and joint pain (chronic OA).   Skin: Negative for itching and rash.   Neurological: Negative for sensory change, focal weakness and seizures.   Psychiatric/Behavioral: Positive for depression. The patient is nervous/anxious.        Consultants/Specialty  ICU-PMA    Disposition  Inpatient, awaiting hospice placement    Quality Measures  Quality-Core Measures   Reviewed items::  Medications reviewed and Labs reviewed  Salcido catheter::  No Salcido  DVT prophylaxis pharmacological::  Heparin          Physical Exam       Vitals:    03/27/18 1925 03/27/18 2103 03/28/18 0325 03/28/18 0717   BP: 140/70  132/85 141/65   Pulse: 83 85 76 70   Resp: 17 18 18 16   Temp: 36.6 °C (97.8 °F)  36.6 °C (97.9 °F) 36.4 °C (97.6 °F)   SpO2: 92% 94% 99% 93%   Weight:       Height:         Body mass index is 23.07 kg/m².    Oxygen Therapy:  Pulse Oximetry: 93 %, O2 (LPM): 2, O2 Delivery: Silicone Nasal Cannula    Physical Exam   Constitutional: She is oriented to person, place, and time. No distress.   HENT:   Head: Normocephalic and atraumatic.   Eyes: Conjunctivae are normal. Pupils are equal, round, and reactive to light.   Neck: Normal range of motion. Neck supple.   Cardiovascular: Normal rate and regular rhythm.    Pulmonary/Chest: Effort normal and breath sounds normal. She has no wheezes.   Abdominal: Soft. Bowel sounds are normal. There is no tenderness.   Musculoskeletal: Normal range of motion. She exhibits no edema or tenderness.   Neurological: She is alert and oriented to person, place, and time.   Skin: Skin is warm and dry. She is not diaphoretic.   Psychiatric: Memory and affect normal.         Lab Data Review:         3/28/2018  6:46 AM    Recent Labs      03/26/18   0636  03/26/18   1044  03/27/18   0605  03/28/18   0456   SODIUM  135  133*  136  132*   POTASSIUM  3.5*  3.9  3.3*  4.0    CHLORIDE  95*  88*  104  93*   CO2  31  34*  24  32   BUN  20  27*  24*  28*   CREATININE  0.65  0.72  0.46*  0.68   MAGNESIUM  1.5   --   1.3*  2.0   PHOSPHORUS  4.4   --   2.7  3.3   CALCIUM  7.7*  8.7  6.6*  8.9       Recent Labs      03/26/18   0636  03/26/18   1044  03/27/18   0605  03/28/18   0456   ALTSGPT  16   --   12  18   ASTSGOT  15   --   14  16   ALKPHOSPHAT  75   --   42  65   TBILIRUBIN  0.4   --   0.4  0.5   GLUCOSE  191*  224*  158*  119*       Recent Labs      03/26/18   0636  03/27/18   0605  03/28/18   0556   RBC  4.39  3.43*  4.62   HEMOGLOBIN  13.9  10.7*  14.3   HEMATOCRIT  41.0  32.8*  43.2   PLATELETCT  312  295  395       Recent Labs      03/26/18   0636  03/27/18   0605  03/28/18   0456  03/28/18   0556   WBC  14.2*  15.5*   --   22.7*   NEUTSPOLYS  88.00*  87.00*   --   77.50*   LYMPHOCYTES  7.00*  6.90*   --   14.50*   MONOCYTES  3.00  3.70   --   4.60   EOSINOPHILS  0.00  0.00   --   0.20   BASOPHILS  0.20  0.10   --   0.20   ASTSGOT  15  14  16   --    ALTSGPT  16  12  18   --    ALKPHOSPHAT  75  42  65   --    TBILIRUBIN  0.4  0.4  0.5   --            Assessment/Plan     * Septic shock (CMS-[facility])- (present on admission)   Assessment & Plan    - Resolved  - Hx COPD 2/2 tobacco, lung cancer s/p lobectomy 2008  - Not on home O2 therapy  - CT shows scattered groundglass opacities  - Blood, urine cx negative  - Finished course unasyn 3/24  - IV steroids  - Supplemental O2 - now on 2L        Acute hypoxemic respiratory failure (CMS-HCC)- (present on admission)   Assessment & Plan    - Hx COPD secondary to tobacco use (not on home O2), cancer s/p lobectomy  - Echo 3/21 showed pulm HTN  - No longer requiring oxygen  - IV Unasyn course completed on 3/24  Plan:  - Duoneb PRN  - taper prednisone from 40 mg PO to 30 mg on 3/29  - c/w duoneb  - RT protocol, supplemental O2 prn  - Awaiting placement in SNF with hospice        CAP (community acquired pneumonia)- (present on admission)   Assessment  & Plan    - CT 3/21 showed scattered groundglass opacities that were suggestive of pneumonia  - Finished course IV unasyn on 3/24  - Supplemental O2 PRN  - guaifenesin cough suppressant prn          COPD with acute exacerbation (CMS-HCC)- (present on admission)   Assessment & Plan    - 55 pack year tobacco history, current smoker  - End stage, palliative evaluating patient, referral to SNF hospice placed  Plan:   - Supplemental O2 PRN  - Unasyn course completed 3/24  - Prednisone 40 mg 3/28 taper to 30 mg on 3/29  - Duoneb scheduled  - IS, expectorants  - Pt to be d/c'ed with symbicort          History of lung cancer- (present on admission)   Assessment & Plan    - S/p right upper lobectomy in 2008 during same surgery CABG was performed. Performed at [facility] ([place], [place] in [facility].  - Not followed by oncology  - CT chest 3/21 showed scattered opacities        Back pain   Assessment & Plan    Patient with h/o chronic back pain and joint pain 2/2 osteoporosis and OA on percocet at home  - Home dose resumed        Constipation   Assessment & Plan    Patient with a h/o of constipation with last BM on 3/21  - Administer bowel protocol and fleet enema        Dysphagia   Assessment & Plan    - C/w dysphagia 1 diabetic diet        Electrolyte abnormality   Assessment & Plan    - monitor and replete K and Mg        Hypothyroidism- (present on admission)   Assessment & Plan    - TSH WNL    - Continue home med         Essential hypertension- (present on admission)   Assessment & Plan    On losartan-hctz (50-12.5) and lasix 40 daily at home. Presented with hypotension due to sepsis, meds were initially held.   - Lasix 20-40mg and losartan resumed 3/22, amlodipine 5added 3/24, BPs remained elevated 3/27, amlodipine changed to nifedipine 30  - BPs currently well controlled  - c/w nifedipine 30 mg, lasix 20 mg daily  - continue home losartan 50 mg   - PRN hydralazine and labetolol        Diabetes mellitus type 2, controlled  (CMS-HCC)- (present on admission)   Assessment & Plan    - Last Hb A1C 6.8 3/2018  - Home metformin resumed 3/27   - ISS/hypoglycemic protocol inpatient and monitor glucose level        Severe episode of recurrent major depressive disorder, without psychotic features (CMS-HCC)- (present on admission)   Assessment & Plan    Home meds include cymbalta BID, lamictal BID, abilify qpm,  xanax BID PRN  - Home meds resumed as above

## 2018-03-28 NOTE — PROGRESS NOTES
2 RN skin check with Yifan CHAIDEZ. Pt have redness to billater buttocks that are blanching. Educated pt with repositioning for pressure ulcer prevention. Pt verbalize understanding. Otherwise, pt's skin intact.

## 2018-03-28 NOTE — PROGRESS NOTES
Patient aox4 resting in bed fall precaution in place will continue to monitor no other concerns or complaints

## 2018-03-28 NOTE — PROGRESS NOTES
Patient's daughter, Jose, called at [phone] per patient's request. Per Jose, she was told that her mom had metastatic cancer while she was in the ICU and wants her mother worked up for this to find out if this is really the case. CT scan on 3/21 was concerning for possible recurrent malignancy. Patient's medical condition discussed with daughter. Although she is clinically improving currently, and she is medically clear for discharge, she does have advanced COPD, pulmonary hypertension, current smoking history, and a history of lung cancer. She would likely not be a good candidate for any aggressive treatment for cancer at this time if she were to be worked up for this. Skilled Nursing Facility options and hospice discussed with Jose. Per Jose, her brother's wife has gone to look at a SNF on [address] ([place]?) which may have beds available. Jose made aware that SW is currently working on placement.     Attempted to reach son Praveen as well, but phone number was not functioning ([phone]).

## 2018-03-28 NOTE — PALLIATIVE CARE
Palliative Care follow-up  PC RN and SW visited bedside to f/u with patient and family. No family present and Praveen's phone number confirmed with Yuliet. SW will reach out to family to see if any assistance is needed with POC.     Plan: TBD- initial plan was [facility] with hospice; some notes reflect SNF (?)    Thank you for allowing Palliative Care to participate in this patient's care. Please feel free to call [phone] with any questions or concerns.

## 2018-03-28 NOTE — DISCHARGE PLANNING
Per fax, referral has been accepted with [facility]. [facility] Yaakov notified.

## 2018-03-28 NOTE — DISCHARGE PLANNING
"Medical Social Work  Met with patient who stated her children are working on a place for her go to.  Asked that I call her son at [phone].    PC to [phone]: \" you don't need to dial a 1 to connect to this number.\"    PC to patient's daughter Jose stated they are looking at a skilled facility for their mother, while they would like her to go to the facility on [address] they where told it does not have any Medicaid beds.  Suggested I submit a blanket referral to see if there are beds available, given verbal approval.      Faxed Choice to [facility].   "

## 2018-03-28 NOTE — ASSESSMENT & PLAN NOTE
Patient with h/o chronic back pain and joint pain 2/2 osteoporosis and OA on percocet at home  - Home dose resumed

## 2018-03-28 NOTE — CARE PLAN
Problem: Safety  Goal: Will remain free from falls  Bed alarm is on, safety teaching reinforced

## 2018-03-29 VITALS
BODY MASS INDEX: 23.07 KG/M2 | OXYGEN SATURATION: 93 % | HEIGHT: 59 IN | WEIGHT: 114.42 LBS | HEART RATE: 76 BPM | SYSTOLIC BLOOD PRESSURE: 157 MMHG | DIASTOLIC BLOOD PRESSURE: 68 MMHG | TEMPERATURE: 98.2 F | RESPIRATION RATE: 20 BRPM

## 2018-03-29 PROBLEM — K59.00 CONSTIPATION: Status: RESOLVED | Noted: 2018-03-28 | Resolved: 2018-03-29

## 2018-03-29 LAB
ALBUMIN SERPL BCP-MCNC: 3.3 G/DL (ref 3.2–4.9)
ALBUMIN/GLOB SERPL: 1.6 G/DL
ALP SERPL-CCNC: 59 U/L (ref 30–99)
ALT SERPL-CCNC: 20 U/L (ref 2–50)
ANION GAP SERPL CALC-SCNC: 7 MMOL/L (ref 0–11.9)
AST SERPL-CCNC: 14 U/L (ref 12–45)
BASOPHILS # BLD AUTO: 0.2 % (ref 0–1.8)
BASOPHILS # BLD: 0.05 K/UL (ref 0–0.12)
BILIRUB SERPL-MCNC: 0.7 MG/DL (ref 0.1–1.5)
BUN SERPL-MCNC: 27 MG/DL (ref 8–22)
CALCIUM SERPL-MCNC: 8.8 MG/DL (ref 8.5–10.5)
CHLORIDE SERPL-SCNC: 92 MMOL/L (ref 96–112)
CO2 SERPL-SCNC: 31 MMOL/L (ref 20–33)
CREAT SERPL-MCNC: 0.78 MG/DL (ref 0.5–1.4)
EOSINOPHIL # BLD AUTO: 0.15 K/UL (ref 0–0.51)
EOSINOPHIL NFR BLD: 0.6 % (ref 0–6.9)
ERYTHROCYTE [DISTWIDTH] IN BLOOD BY AUTOMATED COUNT: 43.7 FL (ref 35.9–50)
GLOBULIN SER CALC-MCNC: 2.1 G/DL (ref 1.9–3.5)
GLUCOSE BLD-MCNC: 144 MG/DL (ref 65–99)
GLUCOSE BLD-MCNC: 95 MG/DL (ref 65–99)
GLUCOSE SERPL-MCNC: 87 MG/DL (ref 65–99)
HCT VFR BLD AUTO: 43.7 % (ref 37–47)
HGB BLD-MCNC: 14.5 G/DL (ref 12–16)
IMM GRANULOCYTES # BLD AUTO: 0.79 K/UL (ref 0–0.11)
IMM GRANULOCYTES NFR BLD AUTO: 3.4 % (ref 0–0.9)
LYMPHOCYTES # BLD AUTO: 4.38 K/UL (ref 1–4.8)
LYMPHOCYTES NFR BLD: 19 % (ref 22–41)
M TB TUBERC IFN-G BLD QL: ABNORMAL
M TB TUBERC IFN-G BLD QL: NEGATIVE
M TB TUBERC IFN-G/MITOGEN IGNF BLD: 0
M TB TUBERC IFN-G/MITOGEN IGNF BLD: 0.01
M TB TUBERC IGNF/MITOGEN IGNF CONTROL: 0.41 [IU]/ML
M TB TUBERC IGNF/MITOGEN IGNF CONTROL: 6.74 [IU]/ML
MAGNESIUM SERPL-MCNC: 1.7 MG/DL (ref 1.5–2.5)
MCH RBC QN AUTO: 31.1 PG (ref 27–33)
MCHC RBC AUTO-ENTMCNC: 33.2 G/DL (ref 33.6–35)
MCV RBC AUTO: 93.8 FL (ref 81.4–97.8)
MITOGEN IGNF BCKGRD COR BLD-ACNC: 0.02 [IU]/ML
MITOGEN IGNF BCKGRD COR BLD-ACNC: 0.04 [IU]/ML
MONOCYTES # BLD AUTO: 1.11 K/UL (ref 0–0.85)
MONOCYTES NFR BLD AUTO: 4.8 % (ref 0–13.4)
NEUTROPHILS # BLD AUTO: 16.62 K/UL (ref 2–7.15)
NEUTROPHILS NFR BLD: 72 % (ref 44–72)
NRBC # BLD AUTO: 0 K/UL
NRBC BLD-RTO: 0 /100 WBC
PHOSPHATE SERPL-MCNC: 2.5 MG/DL (ref 2.5–4.5)
PLATELET # BLD AUTO: 293 K/UL (ref 164–446)
PMV BLD AUTO: 10.4 FL (ref 9–12.9)
POTASSIUM SERPL-SCNC: 4.2 MMOL/L (ref 3.6–5.5)
PROT SERPL-MCNC: 5.4 G/DL (ref 6–8.2)
RBC # BLD AUTO: 4.66 M/UL (ref 4.2–5.4)
SODIUM SERPL-SCNC: 130 MMOL/L (ref 135–145)
WBC # BLD AUTO: 23.1 K/UL (ref 4.8–10.8)

## 2018-03-29 PROCEDURE — 700102 HCHG RX REV CODE 250 W/ 637 OVERRIDE(OP): Performed by: INTERNAL MEDICINE

## 2018-03-29 PROCEDURE — 83735 ASSAY OF MAGNESIUM: CPT

## 2018-03-29 PROCEDURE — 36415 COLL VENOUS BLD VENIPUNCTURE: CPT

## 2018-03-29 PROCEDURE — 84100 ASSAY OF PHOSPHORUS: CPT

## 2018-03-29 PROCEDURE — A9270 NON-COVERED ITEM OR SERVICE: HCPCS | Performed by: INTERNAL MEDICINE

## 2018-03-29 PROCEDURE — 82962 GLUCOSE BLOOD TEST: CPT

## 2018-03-29 PROCEDURE — 80053 COMPREHEN METABOLIC PANEL: CPT

## 2018-03-29 PROCEDURE — 700111 HCHG RX REV CODE 636 W/ 250 OVERRIDE (IP): Performed by: INTERNAL MEDICINE

## 2018-03-29 PROCEDURE — 85025 COMPLETE CBC W/AUTO DIFF WBC: CPT

## 2018-03-29 PROCEDURE — A9270 NON-COVERED ITEM OR SERVICE: HCPCS | Performed by: STUDENT IN AN ORGANIZED HEALTH CARE EDUCATION/TRAINING PROGRAM

## 2018-03-29 PROCEDURE — 99239 HOSP IP/OBS DSCHRG MGMT >30: CPT | Mod: GC | Performed by: INTERNAL MEDICINE

## 2018-03-29 PROCEDURE — 700111 HCHG RX REV CODE 636 W/ 250 OVERRIDE (IP): Performed by: STUDENT IN AN ORGANIZED HEALTH CARE EDUCATION/TRAINING PROGRAM

## 2018-03-29 PROCEDURE — 700102 HCHG RX REV CODE 250 W/ 637 OVERRIDE(OP): Performed by: STUDENT IN AN ORGANIZED HEALTH CARE EDUCATION/TRAINING PROGRAM

## 2018-03-29 RX ORDER — BUDESONIDE AND FORMOTEROL FUMARATE DIHYDRATE 160; 4.5 UG/1; UG/1
2 AEROSOL RESPIRATORY (INHALATION) 2 TIMES DAILY
Start: 2018-03-29

## 2018-03-29 RX ORDER — NIFEDIPINE 30 MG
30 TABLET, EXTENDED RELEASE ORAL DAILY
Qty: 30 TAB
Start: 2018-03-30

## 2018-03-29 RX ORDER — LOSARTAN POTASSIUM 50 MG/1
50 TABLET ORAL DAILY
Qty: 30 TAB
Start: 2018-03-30

## 2018-03-29 RX ORDER — FUROSEMIDE 20 MG/1
20 TABLET ORAL DAILY
Qty: 60 TAB
Start: 2018-03-30

## 2018-03-29 RX ORDER — ALPRAZOLAM 0.25 MG/1
0.25 TABLET ORAL 2 TIMES DAILY PRN
Qty: 30 TAB | Refills: 0
Start: 2018-03-29 | End: 2018-04-28

## 2018-03-29 RX ORDER — BUDESONIDE AND FORMOTEROL FUMARATE DIHYDRATE 160; 4.5 UG/1; UG/1
2 AEROSOL RESPIRATORY (INHALATION) 2 TIMES DAILY
Status: DISCONTINUED | OUTPATIENT
Start: 2018-03-29 | End: 2018-03-29 | Stop reason: HOSPADM

## 2018-03-29 RX ORDER — PREDNISONE 10 MG/1
10 TABLET ORAL DAILY
Qty: 16 TAB | Refills: 0
Start: 2018-03-30 | End: 2018-04-09

## 2018-03-29 RX ADMIN — POTASSIUM CHLORIDE 20 MEQ: 1500 TABLET, EXTENDED RELEASE ORAL at 09:11

## 2018-03-29 RX ADMIN — TIOTROPIUM BROMIDE 1 CAPSULE: 18 CAPSULE ORAL; RESPIRATORY (INHALATION) at 08:59

## 2018-03-29 RX ADMIN — GUAIFENESIN 200 MG: 100 SOLUTION ORAL at 12:27

## 2018-03-29 RX ADMIN — PREDNISONE 30 MG: 10 TABLET ORAL at 08:57

## 2018-03-29 RX ADMIN — ALPRAZOLAM 0.25 MG: 0.25 TABLET ORAL at 14:10

## 2018-03-29 RX ADMIN — NICOTINE 7 MG: 7 PATCH, EXTENDED RELEASE TRANSDERMAL at 06:02

## 2018-03-29 RX ADMIN — FUROSEMIDE 20 MG: 40 TABLET ORAL at 08:58

## 2018-03-29 RX ADMIN — OXYCODONE HYDROCHLORIDE AND ACETAMINOPHEN 1 TABLET: 5; 325 TABLET ORAL at 03:42

## 2018-03-29 RX ADMIN — HEPARIN SODIUM 5000 UNITS: 5000 INJECTION, SOLUTION INTRAVENOUS; SUBCUTANEOUS at 14:10

## 2018-03-29 RX ADMIN — BUDESONIDE AND FORMOTEROL FUMARATE DIHYDRATE 2 PUFF: 160; 4.5 AEROSOL RESPIRATORY (INHALATION) at 08:58

## 2018-03-29 RX ADMIN — LAMOTRIGINE 200 MG: 100 TABLET ORAL at 08:57

## 2018-03-29 RX ADMIN — LEVOTHYROXINE SODIUM 88 MCG: 88 TABLET ORAL at 06:02

## 2018-03-29 RX ADMIN — NIFEDIPINE 30 MG: 30 TABLET, FILM COATED, EXTENDED RELEASE ORAL at 08:59

## 2018-03-29 RX ADMIN — GUAIFENESIN 200 MG: 100 SOLUTION ORAL at 07:32

## 2018-03-29 RX ADMIN — OXYCODONE HYDROCHLORIDE AND ACETAMINOPHEN 1 TABLET: 5; 325 TABLET ORAL at 07:28

## 2018-03-29 RX ADMIN — METFORMIN HYDROCHLORIDE 500 MG: 500 TABLET, FILM COATED ORAL at 07:29

## 2018-03-29 RX ADMIN — DULOXETINE HYDROCHLORIDE 60 MG: 30 CAPSULE, DELAYED RELEASE ORAL at 08:58

## 2018-03-29 RX ADMIN — OXYCODONE HYDROCHLORIDE AND ACETAMINOPHEN 1 TABLET: 5; 325 TABLET ORAL at 15:25

## 2018-03-29 RX ADMIN — LOSARTAN POTASSIUM 50 MG: 50 TABLET, FILM COATED ORAL at 08:58

## 2018-03-29 RX ADMIN — HEPARIN SODIUM 5000 UNITS: 5000 INJECTION, SOLUTION INTRAVENOUS; SUBCUTANEOUS at 06:02

## 2018-03-29 ASSESSMENT — PAIN SCALES - GENERAL: PAINLEVEL_OUTOF10: 10

## 2018-03-29 ASSESSMENT — LIFESTYLE VARIABLES: EVER_SMOKED: YES

## 2018-03-29 NOTE — DISCHARGE PLANNING
Medical Social Work  Informed by [facility], [facility] needs to contact [facility]amor[facility] prior to admit.    PC to [employer], talked to Kindra, she will contact them and get back to me.

## 2018-03-29 NOTE — DISCHARGE PLANNING
Bernadine at [facility] is requesting [facility] and LOC. [facility] Yaakov notified.

## 2018-03-29 NOTE — PROGRESS NOTES
Family request call from hospital ist and case management to clarify plan of care/ discharge planning.  Patient requesting transfer to rehab  ( questionable with/without hospice?), and clarification on terminal cancer of lungs vs pneumonia.

## 2018-03-29 NOTE — DISCHARGE INSTRUCTIONS
Discharge Instructions    Discharged to other by medical transportation with escort. Discharged via ambulance, hospital escort: Yes.  Special equipment needed: Oxygen    Be sure to schedule a follow-up appointment with your primary care doctor or any specialists as instructed.     Discharge Plan:   Diet Plan: Discussed  Activity Level: Discussed  Smoking Cessation Offered: Patient Refused  Confirmed Follow up Appointment: Patient to Call and Schedule Appointment  Pneumococcal Vaccine Given - only chart on this line when given: Given (See MAR)  Influenza Vaccine Indication: Not indicated: Previously immunized this influenza season and > 8 years of age    I understand that a diet low in cholesterol, fat, and sodium is recommended for good health. Unless I have been given specific instructions below for another diet, I accept this instruction as my diet prescription.   Other diet: Diabetic      Special Instructions: Sepsis, Adult  Sepsis is a serious infection of your blood or tissues that affects your whole body. The infection that causes sepsis may be bacterial, viral, fungal, or parasitic. Sepsis may be life threatening. Sepsis can cause your blood pressure to drop. This may result in shock. Shock causes your central nervous system and your organs to stop working correctly.  What increases the risk?  Sepsis can happen in anyone, but it is more likely to happen in people who have weakened immune systems.  What are the signs or symptoms?  Symptoms of sepsis can include:  · Fever or low body temperature (hypothermia).  · Rapid breathing (hyperventilation).  · Chills.  · Rapid heartbeat (tachycardia).  · Confusion or light-headedness.  · Trouble breathing.  · Urinating much less than usual.  · Cool, clammy skin or red, flushed skin.  · Other problems with the heart, kidneys, or brain.  How is this diagnosed?  Your health care provider will likely do tests to look for an infection, to see if the infection has spread to  your blood, and to see how serious your condition is. Tests can include:  · Blood tests, including cultures of your blood.  · Cultures of other fluids from your body, such as:  ¨ Urine.  ¨ Pus from wounds.  ¨ Mucus coughed up from your lungs.  · Urine tests other than cultures.  · X-ray exams or other imaging tests.  How is this treated?  Treatment will begin with elimination of the source of infection. If your sepsis is likely caused by a bacterial or fungal infection, you will be given antibiotic or antifungal medicines.  You may also receive:  · Oxygen.  · Fluids through an IV tube.  · Medicines to increase your blood pressure.  · A machine to clean your blood (dialysis) if your kidneys fail.  · A machine to help you breathe if your lungs fail.  Get help right away if:  You get an infection or develop any of the signs and symptoms of sepsis after surgery or a hospitalization.  This information is not intended to replace advice given to you by your health care provider. Make sure you discuss any questions you have with your health care provider.  Document Released: 09/15/2004 Document Revised: 05/25/2017 Document Reviewed: 08/25/2014  [employer] Interactive Patient Education © 2017 [employer]      · Is patient discharged on Warfarin / Coumadin?   No     Depression / Suicide Risk    As you are discharged from this [facility] facility, it is important to learn how to keep safe from harming yourself.    Recognize the warning signs:  · Abrupt changes in personality, positive or negative- including increase in energy   · Giving away possessions  · Change in eating patterns- significant weight changes-  positive or negative  · Change in sleeping patterns- unable to sleep or sleeping all the time   · Unwillingness or inability to communicate  · Depression  · Unusual sadness, discouragement and loneliness  · Talk of wanting to die  · Neglect of personal appearance   · Rebelliousness- reckless behavior  · Withdrawal  from people/activities they love  · Confusion- inability to concentrate     If you or a loved one observes any of these behaviors or has concerns about self-harm, here's what you can do:  · Talk about it- your feelings and reasons for harming yourself  · Remove any means that you might use to hurt yourself (examples: pills, rope, extension cords, firearm)  · Get professional help from the community (Mental Health, Substance Abuse, psychological counseling)  · Do not be alone:Call your Safe Contact- someone whom you trust who will be there for you.  · Call your local CRISIS HOTLINE 464-0653 or 720-232-8992  · Call your local Children's Mobile Crisis Response Team [place] (521) 476-2535 or www.Meludia  · Call the toll free National Suicide Prevention Hotlines   · National Suicide Prevention Lifeline 724-236-NDFE (0644)  · National ChannelMeter Line Network 800-SUICIDE (714-5562)        Healthcare-Associated Pneumonia  Healthcare-associated pneumonia is a lung infection that a person can get when in a health care setting or during certain procedures. The infection causes air sacs inside the lungs to fill with pus or fluid.  Healthcare-associated pneumonia is usually caused by bacteria that are common in health care settings. These bacteria may be resistant to some antibiotic medicines.  What are the causes?  This condition is caused by bacteria that get into your lungs. You can get this condition if you:  · Breathe in droplets from an infected person's cough or sneeze.  · Touch something that an infected person coughed or sneezed on and then touch your mouth, nose, or eyes.  · Have a bacterial infection somewhere else in your body, if the bacteria spread to your lungs through your blood.  What increases the risk?  This condition is more likely to develop in people who:  · Have a disease that weakens their body's defense system (immune system) or their ability to cough out germs.  · Are older than age  65.  · Having trouble swallowing.  · Use a feeding or breathing tube.  · Have a cold or the flu.  · Have an IV tube inserted in a vein.  · Have surgery.  · Have a bed sore.  · Live in a long-term care facility, such as a nursing home.  · Were in the hospital for two or more days in the past 3 months.  · Received hemodialysis in the past 30 days.  What are the signs or symptoms?  Symptoms of this condition include:  · Fever.  · Chills.  · Cough.  · Shortness of breath.  · Wheezing or crackling sounds when breathing.  How is this diagnosed?  This condition may be diagnosed based on:  · Your symptoms.  · A chest X-ray.  · A measurement of the amount of oxygen in your blood.  How is this treated?  This condition is treated with antibiotics. Your health care provider may take a sample of cells (culture) from your throat to determine what type of bacteria is in your lungs and change your antibiotic based on the results. If you have bacteria in your blood, trouble breathing, or a low oxygen level, you may need to be treated at the hospital. At the hospital, you will be given antibiotics through an IV tube. You may also be given oxygen or breathing treatments.  Follow these instructions at home:  Medicine  · Take your antibiotic medicine as told by your health care provider. Do not stop taking the antibiotic even if you start to feel better.  · Take over-the-counter and other prescription medicines only as told by your health care provider.  Activity  · Rest at home until you feel better.  · Return to your normal activities as told by your health care provider. Ask your health care provider what activities are safe for you.  General instructions  · Drink enough fluid to keep your urine clear or pale yellow.  · Do not use any products that contain nicotine or tobacco, such as cigarettes and e-cigarettes. If you need help quitting, ask your health care provider.  · Limit alcohol intake to no more than 1 drink per day for  nonpregnant women and 2 drinks per day for men. One drink equals 12 oz of beer, 5 oz of wine, or 1½ oz of hard liquor.  · Keep all follow-up visits as told by your health care provider. This is important.  How is this prevented?  Actions that I can take  To lower your risk of getting this condition again:  · Do not smoke. This includes e-cigarettes.  · Do not drink too much alcohol.  · Keep your immune system healthy by eating well and getting enough sleep.  · Get a flu shot every year (annually).  · Get a pneumonia vaccination if:  ¨ You are older than age 65.  ¨ You smoke.  ¨ You have a long-lasting condition like lung disease.  · Exercise your lungs by taking deep breaths, walking, and using an incentive spirometer as directed.  · Wash your hands often with soap and water. If you cannot get to a sink to wash your hands, use an alcohol-based hand .  · Make sure your health care providers are washing their hands. If you do not see them wash their hands, ask them to do so.  · When you are in a health care facility, avoid touching your eyes, nose, and mouth.  · Avoid touching any surface near where people have coughed or sneezed.  · Stand away from sick people when they are coughing or sneezing.  · Wear a mask if you cannot avoid exposure to people who are sick.  · Clean all surfaces often with a disinfectant , especially if someone is sick at home or work.  Precautions of my health care team  Hospitals, nursing homes, and other health care facilities take special care to try to prevent healthcare-associated pneumonia. To do this, your health care team may:  · Clean their hands with soap and water or with alcohol-based hand  before and after seeing patients.  · Wear gloves or masks during treatment.  · Sanitize medical instruments, tubes, other equipment, and surfaces in patient rooms.  · Raise (elevate) the head of your hospital bed so you are not lying flat. The head of the bed may be  elevated 30 degrees or more.  · Have you sit up and move around as soon as possible after surgery.  · Only insert a breathing tube if needed.  · Do these things for you if you have a breathing tube:  ¨ Clean the inside of your mouth regularly.  ¨ Remove the breathing tube as soon as it is no longer needed.  Contact a health care provider if:  · Your symptoms do not get better or they get worse.  · Your symptoms come back after you have finished taking your antibiotics.  Get help right away if:  · You have trouble breathing.  · You have confusion or difficulty thinking.  This information is not intended to replace advice given to you by your health care provider. Make sure you discuss any questions you have with your health care provider.  Document Released: 05/09/2017 Document Revised: 10/03/2017 Document Reviewed: 09/15/2017  [employer] Interactive Patient Education © 2017 [employer]

## 2018-03-29 NOTE — PALLIATIVE CARE
"Palliative Care follow-up  Pt's son Praveen called requesting to speak with a PC RN.     PC RN visited pt and son at bedside, Praveen had questions about POLST form. PC RN retrieved POLST form from chart and reviewed with pt and Praveen. Both agree that it reflects pt's wishes. Praveen had questions about discharge to SNF for therapies. Praveen states that at first they thought hospice but since pt has improved and there is question about whether or not pt has recurrent lung cancer. Pt stated \"I want to go to rehab\". Praveen and his wife have already spoken with [facility] and believe she has been accepted. PC RN explained the processes of discharge to SNF, once SW gets the acceptance they will schedule transport for pt. Praveen and pt verbalized understanding. PC RN discussed advanced directive, Praveen has a document they were going to complete. PC RN suggested to complete form before discharge so PC notary can come notarize it before discharge. Praveen stated he will get something to eat then return and help pt complete form. Praveen states that he is in contact with [facility] hospice and will reach out to them if/when pt is ready for end-of-life care.    Certificate of competency left on pt's hard chart for MD, called [facility] Dr. Roblero she will sign document for pt this morning.    Discussed updates with Yaakov CARVAJAL, he will follow up with pt and family.       Updated:   Yaakov Avitia    Plan:   Pt to complete AD with son Praveen, discharge to SNF for therapies.     Thank you for allowing Palliative Care to participate in this patient's care. Please feel free to call [phone] with any questions or concerns.  "

## 2018-03-29 NOTE — CARE PLAN
Problem: Safety  Goal: Will remain free from injury  Bed alarm in place, bed at lowest position, call light within reach. Room close to nursing station     Problem: Bowel/Gastric:  Goal: Normal bowel function is maintained or improved  Enema and prune juice administered. Positive Bms today

## 2018-03-29 NOTE — PROGRESS NOTES
"Pulmonary Progress Note    Patient ID:   Name:             Mia Hammond   YOB: 1940  Age:                 77 y.o.  female   MRN:               [id number]                                                  Subjective:  Breathing much better    Interval Changes: desaturates to 85% with activity and off O2; Cxr shows partial clearing of infiltrates and congestions    Objective:   Vitals/ General Appearance:   Weight/BMI: Body mass index is 23.07 kg/m².  Blood pressure 127/53, pulse 78, temperature 36.5 °C (97.7 °F), resp. rate 18, height 1.5 m (4' 11.06\"), weight 51.9 kg (114 lb 6.7 oz), SpO2 91 %, not currently breastfeeding.  Vitals:    03/28/18 1527 03/28/18 1734 03/28/18 1735 03/28/18 1925   BP: (!) 162/68   127/53   Pulse: 91   78   Resp: 16   18   Temp: 36.7 °C (98.1 °F)   36.5 °C (97.7 °F)   SpO2: 94% (!) 85% 92% 91%   Weight:       Height:         Oxygen Therapy:  Pulse Oximetry: 91 %, O2 (LPM): 0, O2 Delivery: None (Room Air)    Constitutional:   Well developed, Well nourished, No acute distress  HENMT:  Normocephalic, Atraumatic, Oropharynx moist mucous membranes, No oral exudates, Nose normal.  No thyromegaly.  Eyes:  EOMI, Conjunctiva normal, No discharge.  Neck:  Normal range of motion, No cervical tenderness,  no JVD.  Cardiovascular:  Normal heart rate, Normal rhythm, No murmurs, No rubs, No gallops.   Extremitites with intact distal pulses, no cyanosis, or edema.  Lungs:  Normal breath sounds,rales to auscultation bilaterally,  few crackles, no wheezing.   Abdomen: Bowel sounds normal, Soft, No tenderness, No guarding, No rebound, No masses, No hepatosplenomegaly.  Skin: Warm, Dry, No erythema, No rash, no induration.  Neurologic: Alert & oriented x 3, No focal deficits noted, cranial nerves II through X are grossly intact.  Psychiatric: Affect normal, Judgment normal, Mood normal.    Labs:  Recent Labs      03/26/18   0636  03/27/18   0605  03/28/18   0556   WBC  14.2*  15.5*  22.7* "   RBC  4.39  3.43*  4.62   HEMOGLOBIN  13.9  10.7*  14.3   HEMATOCRIT  41.0  32.8*  43.2   MCV  93.4  95.6  93.5   MCH  31.7  31.2  31.0   MCHC  33.9  32.6*  33.1*   RDW  43.0  43.6  43.7   PLATELETCT  312  295  395   MPV  10.0  9.5  9.5         Recent Labs      03/26/18   1824   BNPBTYPENAT  187*     Recent Labs      03/26/18   1824   BNPBTYPENAT  187*     Recent Labs      03/26/18   1044  03/27/18   0605  03/28/18   0456   SODIUM  133*  136  132*   POTASSIUM  3.9  3.3*  4.0   CHLORIDE  88*  104  93*   CO2  34*  24  32   GLUCOSE  224*  158*  119*   BUN  27*  24*  28*     Recent Labs      03/26/18   0636  03/26/18   1044  03/27/18   0605  03/28/18   0456   SODIUM  135  133*  136  132*   POTASSIUM  3.5*  3.9  3.3*  4.0   CHLORIDE  95*  88*  104  93*   CO2  31  34*  24  32   BUN  20  27*  24*  28*   CREATININE  0.65  0.72  0.46*  0.68   MAGNESIUM  1.5   --   1.3*  2.0   PHOSPHORUS  4.4   --   2.7  3.3   CALCIUM  7.7*  8.7  6.6*  8.9     Results for orders placed or performed during the hospital encounter of 03/20/18   ECHOCARDIOGRAM COMP W/O CONT   Result Value Ref Range    Eject.Frac. MOD BP 59.78     Eject.Frac. MOD 4C 59.81     Eject.Frac. MOD 2C 63.62     Left Ventrical Ejection Fraction 60          Imaging:   DX-CHEST-PORTABLE (1 VIEW)   Final Result      1.  Again seen interstitial infiltrates which may represent some residual interstitial edema.      2.  Bibasilar atelectasis.      DX-CHEST-PORTABLE (1 VIEW)   Final Result      Findings consistent with pulmonary edema, improved compared with 3/22.      DX-CHEST-PORTABLE (1 VIEW)   Final Result      Moderate diffuse interstitial opacities increased when compared to previous and consistent with edema or pneumonitis. No pneumothorax.   Ill-defined opacities left lung apex appears similar to recent findings. Continue follow-up.      ECHOCARDIOGRAM COMP W/O CONT   Final Result      CT-CTA CHEST PULMONARY ARTERY W/ RECONS   Final Result   Addendum 1 of 1   Right  central venous catheter with tip in the mid SVC.      Final         1. No CT evidence of pulmonary embolism.      2. Groundglass and airspace opacities in the left lingula and left lower lobe. Small areas of peripheral groundglass opacities in the right lung as well. These are concerning for multifocal pneumonia. The largest area is in the left lower lobe.      3. Postsurgical change in the left upper lobe with nodular opacities. Comparison to prior study could be helpful for interval change.          Hospital Medications:    Current Facility-Administered Medications:   •  DULoxetine (CYMBALTA) capsule 60 mg, 60 mg, Oral, DAILY, Shanika Roblero M.D., 60 mg at 03/28/18 1039  •  lamoTRIgine (LAMICTAL) tablet 200 mg, 200 mg, Oral, TWICE DAILY, Shanika Roblero M.D.  •  [START ON 3/29/2018] predniSONE (DELTASONE) tablet 30 mg, 30 mg, Oral, DAILY, Shanika Roblero M.D.  •  guaiFENesin (ROBITUSSIN) 100 MG/5ML solution 200 mg, 10 mL, Oral, Q4HRS PRN, Shanika Roblero M.D.  •  NIFEdipine SR (PROCARDIA-XL) tablet 30 mg, 30 mg, Oral, Q DAY, Jayda Villalta M.D., 30 mg at 03/28/18 0815  •  levothyroxine (SYNTHROID) tablet 88 mcg, 88 mcg, Oral, AM ES, Jayda Villalta M.D., 88 mcg at 03/28/18 0612  •  ARIPiprazole (ABILIFY) tablet 5 mg, 5 mg, Oral, DAILY, Jayda Villalta M.D., 5 mg at 03/28/18 0814  •  senna-docusate (PERICOLACE or SENOKOT S) 8.6-50 MG per tablet 2 Tab, 2 Tab, Oral, BID, 2 Tab at 03/28/18 0819 **AND** polyethylene glycol/lytes (MIRALAX) PACKET 1 Packet, 1 Packet, Oral, QDAY PRN **AND** magnesium hydroxide (MILK OF MAGNESIA) suspension 30 mL, 30 mL, Oral, QDAY PRN, 30 mL at 03/28/18 0819 **AND** bisacodyl (DULCOLAX) suppository 10 mg, 10 mg, Rectal, QDAY PRN, Jayda Villalta M.D.  •  tiotropium (SPIRIVA) 18 MCG inhalation capsule 1 Cap, 1 Cap, Inhalation, DAILY, Petros Hancokc M.D., 1 Cap at 03/28/18 0816  •  metFORMIN (GLUCOPHAGE) tablet 500 mg, 500 mg, Oral, BID WITH MEALS, Petros  WILLY Hancock, 500 mg at 03/28/18 1757  •  ipratropium-albuterol (DUONEB) nebulizer solution 3 mL, 3 mL, Nebulization, Q4H PRN (RT), Petros Hancock M.D., 3 mL at 03/27/18 2101  •  ALPRAZolam (XANAX) tablet 0.25 mg, 0.25 mg, Oral, BID PRN, Salomon Claire D.O., 0.25 mg at 03/27/18 1426  •  heparin injection 5,000 Units, 5,000 Units, Subcutaneous, Q8HRS, Dionisio Miller M.D., 5,000 Units at 03/28/18 1443  •  labetalol (NORMODYNE,TRANDATE) injection 10 mg, 10 mg, Intravenous, Q6HRS PRN, Suzie Tello M.D., 10 mg at 03/24/18 0620  •  ondansetron (ZOFRAN) syringe/vial injection 4 mg, 4 mg, Intravenous, Q4HRS PRN, BO CampbellOBridger, 4 mg at 03/24/18 0753  •  insulin regular (HUMULIN R) injection 2-9 Units, 2-9 Units, Subcutaneous, 4X/DAY ACHS, 2 Units at 03/28/18 1809 **AND** Accu-Chek ACHS, , , Q6H **AND** NOTIFY MD and PharmD, , , Once **AND** glucose 4 g chewable tablet 16 g, 16 g, Oral, Q15 MIN PRN **AND** dextrose 50% (D50W) injection 25 mL, 25 mL, Intravenous, Q15 MIN PRN, Jayda Villalta M.D.  •  Respiratory Care per Protocol, , Nebulization, Continuous RT, Salomon Claire D.O.  •  nicotine (NICODERM) 7 MG/24HR 7 mg, 7 mg, Transdermal, Daily-0600, Salomon Claire D.O., 7 mg at 03/28/18 0612  •  furosemide (LASIX) tablet 20 mg, 20 mg, Oral, Q DAY, Jayda Villalta M.D., 20 mg at 03/28/18 0814  •  losartan (COZAAR) tablet 50 mg, 50 mg, Oral, Q DAY, Jayda Villalta M.D., 50 mg at 03/28/18 0814  •  oxyCODONE-acetaminophen (PERCOCET) 5-325 MG per tablet 1 Tab, 1 Tab, Oral, Q4HRS PRN, Jayda Villalta M.D., 1 Tab at 03/28/18 1757  •  potassium chloride SA (Kdur) tablet 20 mEq, 20 mEq, Oral, DAILY, Jayda Villalta M.D., 20 mEq at 03/28/18 0820  •  hydrALAZINE (APRESOLINE) injection 20 mg, 20 mg, Intravenous, Q6HRS PRN, Jayda Villalta M.D., 20 mg at 03/27/18 0837  •  calcium carbonate (TUMS) chewable tab 500 mg, 500 mg, Oral, Q4HRS PRN, Jayda Villalta M.D., 500 mg at 03/24/18  0547  •  lidocaine viscous 2% (XYLOCAINE) solution 5 mL, 5 mL, Mouth/Throat, Q4HRS PRN, Salomon Claire D.O.  •  glycopyrrolate (ROBINUL) tablet 1 mg, 1 mg, Oral, TID PRN, Salomon Claire D.O.    Current Outpatient Medications:  Prescriptions Prior to Admission   Medication Sig Dispense Refill Last Dose   • Diclofenac Sodium (VOLTAREN) 1 % Gel Apply 1 Application to skin as directed 3 times a day as needed. Apply thin layer 3 times a day to affected area  Joints   3/19/2018 at 1200   • DULoxetine (CYMBALTA) 60 MG Cap DR Particles delayed-release capsule Take 60 mg by mouth 2 times a day.   3/19/2018 at 1900   • furosemide (LASIX) 40 MG Tab Take 40 mg by mouth every morning as needed (leg swelling).   > 4 DAYS at PRN   • Potassium Chloride CR (MICRO-K) 8 MEQ Cap CR capsule Take 8 mEq by mouth every morning as needed. Take with LASIX   > 4 days at PRN   • azithromycin (ZITHROMAX) 250 MG Tab Take 2 tablets by mouth on day one, then 1 tablet by mouth on days two through five 6 Tab 0 3/19/2018 at 0900   • losartan-hydrochlorothiazide (HYZAAR) 50-12.5 MG per tablet TAKE 1 TABLET BY MOUTH ONCE DAILY 90 Tab 1 3/19/2018 at 0900   • SPIRIVA HANDIHALER 18 MCG Cap INHALE THE CONTENTS OF 1 CAPSULE BY MOUTH VIA HANDIHALER ONCE DAILY 90 Cap 1 3/20/2018 at 0900   • cilostazol (PLETAL) 100 MG Tab TAKE 1 TABLET BY MOUTH TWICE DAILY FOR LEG PAIN 90 Tab 2 3/19/2018 at 1900   • lamotrigine (LAMICTAL) 200 MG tablet TAKE 1 TABLET BY MOUTH TWICE DAILY 60 Tab 5 3/19/2018 at 1900   • fluticasone (FLONASE) 50 MCG/ACT nasal spray SHAKE LIQUID AND USE 1 SPRAY IN EACH NOSTRIL TWICE DAILY 3 Bottle 3 3/19/2018 at 1900   • aripiprazole (ABILIFY) 5 MG tablet TAKE 1 TABLET BY MOUTH ONCE DAILY IN THE MORNING 90 Tab 1 3/19/2018 at 1900   • levothyroxine (SYNTHROID) 100 MCG Tab Take 1 Tab by mouth Every morning on an empty stomach. 30 Tab 11 3/19/2018 at 0900   • metformin (GLUCOPHAGE) 500 MG Tab Take 500 mg by mouth 2 times a day, with meals.    3/19/2018 at 1700   • rosuvastatin (CRESTOR) 10 MG Tab Take 1 Tab by mouth every evening. 30 Tab 11 3/19/2018 at 1900   • lorazepam (ATIVAN) 0.5 MG Tab TAKE 1 TABLET BY MOUTH EVERY 12 HOURS AS NEEDED FOR ANXIETY 30 Tab 0 > 1 week at PRN   • albuterol 108 (90 BASE) MCG/ACT Aero Soln inhalation aerosol Inhale 1-2 Puffs by mouth every 6 hours as needed. Give albuterol that is patient or insurance preference please 8.5 g 1 3/20/2018 at 0900   • Oxycodone-Acetaminophen (PERCOCET-10)  MG Tab Take 1 Tab by mouth every four hours as needed for Severe Pain.   3/20/2018 at 1200   • aspirin 81 MG tablet Take 81 mg by mouth every day.   3/19/2018 at 0900       Medication Allergy:  Allergies   Allergen Reactions   • Codeine Vomiting   • Latex Hives   • Sulfa Drugs Hives   • Tape      Paper ok       Assessment and Plan:  Principal Problem:    Septic shock (CMS-[facility]) POA: Yes    Active Problems:    COPD with acute exacerbation (CMS-[facility]) POA: Yes      Symbicort       Prednisone tapering dose       Duonebs PRN       Spiriva inhaler daily       CAP (community acquired pneumonia) POA: Yes       Augmentin    Acute hypoxemic respiratory failure (CMS-[facility]) POA: Yes      History of lung cancer POA: Yes 8 yrs ago  Will need repeat CT scan in 4 weeks to see if infiltrates persist which may require having Flex Bronchoscopy as outpatient to rule out malignancy      Severe episode of recurrent major depressive disorder, without psychotic features (CMS-HCC) POA: Yes      Overview: Has been followed by psychiatry who prescribes medications. Recent       increase to Cymbalta.      Diabetes mellitus type 2, controlled (CMS-[facility]) POA: Yes      Overview: She continues metformin twice a day. She is also on an ACE inhibitor and       statin medication. Previous A1c 6.4 in August. She will visit lab today.      Essential hypertension POA: Yes      Overview: Managed with losartan with hydrochlorothiazide 50-12.5 mg with Lasix 20 mg       prn for  edema. She has been monitoring at home and all readings are within       range.       Hypothyroidism POA: Yes    Electrolyte abnormality POA: Unknown    Dysphagia POA: Unknown    Constipation POA: Unknown    Back pain POA: Unknown  Resolved Problems:    Elevated troponin POA: Yes    Hyponatremia POA: Yes    Lactic acidosis POA: Unknown

## 2018-03-29 NOTE — DOCUMENTATION QUERY
"DOCUMENTATION QUERY    PROVIDERS: Please select “Cosign w/ note”to reply to query.      To better represent the severity of illness of your patient, please review the following information and exercise your independent professional judgment in responding to this query.     Magnesium of 1.3 is noted in the Lab Results on 3/27.  Magnesium Sulfate has been given. Based upon the clinical findings, risk factors, and treatment, can a diagnosis be provided to support this finding and treatment?    • The patient has a diagnosis of Hypomagnesemia  • Unable to determine        The medical record reflects the following:   Clinical Findings 3/27 Lab result:  Magnesium 1.3  3/28 Hospitalist note: \"monitor and replete K and Mg\"   Treatment Magnesium Sulfate IVPB  Monitor Labs   Risk Factors Sepsis  Diabetes mellitus  Lung cancer  Pneumonia  COPD  Respiratory failure  Hyponatremia  Hypokalemia   Location within medical record History and Physical, Progress Notes, Lab Results  and MAR     Thank you,   Russ Jolley RN BSN  Clinical   [phone] [facility] office  [phone] Cell phone          "

## 2018-03-29 NOTE — DISCHARGE PLANNING
Transport has been arranged with Bernadine at [facility] for 3:00 PM today. [facility]

## 2018-03-29 NOTE — PROGRESS NOTES
I have personally evaluated and examined this patient and agree with the findings as documented in the resident note except as documented in this attending note.  I am actively involved in the patient's care.       Appreciate Pulm and Palliative help  Team discussed with Pt's family and answered all questions    Please see resident note for complete information

## 2018-03-29 NOTE — PROGRESS NOTES
Dc report given to [facility] at [facility] [phone]   Patient medicated for pain prior to dc with wc and transport.  Aox4, daughter was notified of DC

## 2018-04-12 NOTE — ADDENDUM NOTE
Encounter addended by: Avani Nunez on: 4/11/2018  5:05 PM<BR>    Actions taken: Charge Capture section accepted

## 2018-05-10 ENCOUNTER — APPOINTMENT (OUTPATIENT)
Dept: VASCULAR LAB | Facility: MEDICAL CENTER | Age: 78
End: 2018-05-10
Payer: MEDICARE

## 2018-07-10 ENCOUNTER — OFFICE VISIT (OUTPATIENT)
Dept: BEHAVIORAL HEALTH | Facility: PHYSICIAN GROUP | Age: 78
End: 2018-07-10
Payer: MEDICARE

## 2018-07-10 VITALS
BODY MASS INDEX: 22.65 KG/M2 | SYSTOLIC BLOOD PRESSURE: 110 MMHG | TEMPERATURE: 98.1 F | HEART RATE: 88 BPM | RESPIRATION RATE: 16 BRPM | DIASTOLIC BLOOD PRESSURE: 62 MMHG | HEIGHT: 57 IN | WEIGHT: 105 LBS

## 2018-07-10 DIAGNOSIS — F31.9 BIPOLAR 1 DISORDER, DEPRESSED (HCC): ICD-10-CM

## 2018-07-10 DIAGNOSIS — N28.9 RENAL INSUFFICIENCY: ICD-10-CM

## 2018-07-10 PROCEDURE — 90833 PSYTX W PT W E/M 30 MIN: CPT | Performed by: PSYCHIATRY & NEUROLOGY

## 2018-07-10 PROCEDURE — 99213 OFFICE O/P EST LOW 20 MIN: CPT | Performed by: PSYCHIATRY & NEUROLOGY

## 2018-07-10 ASSESSMENT — PATIENT HEALTH QUESTIONNAIRE - PHQ9
7. TROUBLE CONCENTRATING ON THINGS, SUCH AS READING THE NEWSPAPER OR WATCHING TELEVISION: 0
2. FEELING DOWN, DEPRESSED, IRRITABLE, OR HOPELESS: 1
SUM OF ALL RESPONSES TO PHQ9 QUESTIONS 1 AND 2: 2
5. POOR APPETITE OR OVEREATING: 1
8. MOVING OR SPEAKING SO SLOWLY THAT OTHER PEOPLE COULD HAVE NOTICED. OR THE OPPOSITE, BEING SO FIGETY OR RESTLESS THAT YOU HAVE BEEN MOVING AROUND A LOT MORE THAN USUAL: 0
SUM OF ALL RESPONSES TO PHQ QUESTIONS 1-9: 6
3. TROUBLE FALLING OR STAYING ASLEEP OR SLEEPING TOO MUCH: 1
6. FEELING BAD ABOUT YOURSELF - OR THAT YOU ARE A FAILURE OR HAVE LET YOURSELF OR YOUR FAMILY DOWN: 1
1. LITTLE INTEREST OR PLEASURE IN DOING THINGS: 1
9. THOUGHTS THAT YOU WOULD BE BETTER OFF DEAD, OR OF HURTING YOURSELF: 0
4. FEELING TIRED OR HAVING LITTLE ENERGY: 1

## 2018-07-10 NOTE — PROGRESS NOTES
"[facility]  PSYCHIATRIC FOLLOW-UP NOTE    Name: Mia Hammond  MRN: [id number]  : 1940  Age: 78 y.o.  Date of assessment: 7/10/2018  PCP: SIOBHAN Cao  Persons in attendance: Patient  REASON FOR VISIT/CHIEF COMPLAINT (as stated by Patient):  Mia Hammond is a 78 y.o., White female, attending follow-up appointment for   Chief Complaint   Patient presents with   • Medication Management     I live in [facility] and they are helping me with medications. I have been feeling better now.   .      HISTORY OF PRESENT ILLNESS:    This is 77 yo female, lives in assisted living place, came in a wheel chair, for follow up. The patients recent lab with low GFR and her pharmacist recommended changing cymbalta to lexapro. The patient has been taking cymbalta 60 mg BID.The patients mood has been stable and she denied depression. The patient likes the place that she is living. The patient is taking her medications.        PSYCHOSOCIAL CHANGES SINCE PREVIOUS CONTACT:  She denied depression and her mood has been stable.    RESPONSE TO TREATMENT:  Cymbalta 60 mg BID, lamotrigine 200 mg BID, and abilify 5 mg in am.    MEDICATION SIDE EFFECTS:  Denied.    REVIEW OF SYSTEMS:        Constitutional positive - frequent falls.   Eyes negative   Ears/Nose/Mouth/Throat negative   Cardiovascular positive - hypertension, coronary artery disease, peripheral arterial disease.   Respiratory positive - history of lung disease, COPD.   Gastrointestinal negative   Genitourinary negative   Muscular negative   Integumentary negative   Neurological negative   Endocrine positive - hyperlipidemia, diabetes, and hypothyroidism.   Hematologic/Lymphatic negative       PSYCHIATRIC EXAMINATION/MENTAL STATUS  /62   Pulse 88   Temp 36.7 °C (98.1 °F)   Resp 16   Ht 1.448 m (4' 9.01\")   Wt 47.6 kg (105 lb)   BMI 22.72 kg/m²   Participation: Active verbal participation and " Attentive  Grooming:Neat  Orientation: Alert and Fully Oriented  Eye contact: Good  Behavior:Calm   Mood: Anxious  Affect: Flexible and Full range  Thought process: Logical and Goal-directed  Thought content:  Within normal limits  Speech: Rate within normal limits and Volume within normal limits  Perception:  Within normal limits  Memory:  No gross evidence of memory deficits  Insight: Good  Judgment: Good  Family/couple interaction observations:   Other:    Current risk:    Suicide: Low   Homicide: Low   Self-harm: Low  Relapse: Low  Other:   Crisis Safety Plan reviewed?Yes  If evidence of imminent risk is present, intervention/plan:    Medical Records/Labs/Diagnostic Tests Reviewed: yes.    Medical Records/Labs/Diagnostic Tests Ordered: none.    DIAGNOSTIC IMPRESSION(S):  1. Bipolar 1 disorder, depressed (HCC)    2. Renal insufficiency           ASSESSMENT AND PLAN:    1. Bipolar 1 disorder, depressed.  2. Chronic pain syndrome.  3. Renal insufficiency.  Decrease duloxetine 60 mg one a day for one week, then duloxetine 30 mg one day for one week then stop.  Start lexapro 5 mg for one week then lexapro 10 mg one day.  Lamotrigine 200 mg BID  Abilify 5 mg one in am.    4. Follow up in three months.     16 minutes were spent in psychotherapy.  (If greater than 16 minutes, add 62234 code)   Topics addressed in psychotherapy include:  We discussed her unresolved emotional issues and helped her with emotional skills.  Educated her some social skills and self control.  Agreed to keep a daily routine and a good sleep cycle.  Jean Marie Solis M.D.

## 2018-07-30 ENCOUNTER — TELEPHONE (OUTPATIENT)
Dept: BEHAVIORAL HEALTH | Facility: PHYSICIAN GROUP | Age: 78
End: 2018-07-30

## 2018-08-16 ENCOUNTER — PATIENT OUTREACH (OUTPATIENT)
Dept: HEALTH INFORMATION MANAGEMENT | Facility: OTHER | Age: 78
End: 2018-08-16

## 2018-09-10 NOTE — PROGRESS NOTES
Outcome: Left Message    Please transfer to Patient Outreach Team at [phone] when patient returns call.      Attempt # 2

## 2018-09-14 NOTE — PROGRESS NOTES
Outcome:patient called back and stated at this time she does not want to establish with a new care provider. She is in a home facility. Did address health maintenance    Please transfer to Patient Outreach Team at [phone] when patient returns call.    Attempt # 3

## 2018-09-14 NOTE — PROGRESS NOTES
Outcome: Left Message    Please transfer to Patient Outreach Team at [phone] when patient returns call.        Attempt # 3

## 2018-10-09 ENCOUNTER — OFFICE VISIT (OUTPATIENT)
Dept: BEHAVIORAL HEALTH | Facility: CLINIC | Age: 78
End: 2018-10-09
Payer: MEDICARE

## 2018-10-09 VITALS
SYSTOLIC BLOOD PRESSURE: 112 MMHG | RESPIRATION RATE: 18 BRPM | HEIGHT: 57 IN | BODY MASS INDEX: 23.3 KG/M2 | WEIGHT: 108 LBS | HEART RATE: 86 BPM | DIASTOLIC BLOOD PRESSURE: 64 MMHG

## 2018-10-09 DIAGNOSIS — F31.9 BIPOLAR 1 DISORDER, DEPRESSED (HCC): ICD-10-CM

## 2018-10-09 PROCEDURE — 99213 OFFICE O/P EST LOW 20 MIN: CPT | Performed by: PSYCHIATRY & NEUROLOGY

## 2018-10-09 PROCEDURE — 90833 PSYTX W PT W E/M 30 MIN: CPT | Performed by: PSYCHIATRY & NEUROLOGY

## 2018-10-09 ASSESSMENT — PATIENT HEALTH QUESTIONNAIRE - PHQ9
5. POOR APPETITE OR OVEREATING: 1 - SEVERAL DAYS
SUM OF ALL RESPONSES TO PHQ QUESTIONS 1-9: 8
CLINICAL INTERPRETATION OF PHQ2 SCORE: 2

## 2018-10-09 NOTE — BH THERAPY
"[facility]  PSYCHIATRIC FOLLOW-UP NOTE    Name: Mia Hammond  MRN: [id number]  : 1940  Age: 78 y.o.  Date of assessment: 10/9/2018  PCP: No primary care provider on file.  Persons in attendance: Patient  REASON FOR VISIT/CHIEF COMPLAINT (as stated by Patient):  Mia Hammond is a 78 y.o., White female, attending follow-up appointment for   Chief Complaint   Patient presents with   • Depressed     I stopped lexapro and I am back on cymbalta and it is helping me.   .      HISTORY OF PRESENT ILLNESS:    This is 79 yo female, lives in assisted living place, came on a wheel chair, for follow up, after two months. The patient stopped lexapro and she is back on cymbalta 60 mg bid. The patienbts depression and anxiety improved . The patient had been on xanax and it was discontinued because of her chronic pain and opioid use. The patient has been taking lamotrigine 200 mg bid, abilify 5 mf in am. The patient is sleeping well at night and her kidney functions unchanged.         PSYCHOSOCIAL CHANGES SINCE PREVIOUS CONTACT:  The patients mood has been stable and she denied depression.    RESPONSE TO TREATMENT:  Lamotrigine 200 mg bid, cymbalta 60 mg bid, and abilify 5 mg in am.    MEDICATION SIDE EFFECTS:  Denied.    REVIEW OF SYSTEMS:        Constitutional positive - frequent falls, on wheelchair.   Eyes negative   Ears/Nose/Mouth/Throat negative   Cardiovascular positive - hypertension, coronary artery disease, peripheral arterial disease.   Respiratory positive - chronic obstructive pulmonary disease.   Gastrointestinal negative   Genitourinary negative   Muscular negative   Integumentary negative   Neurological negative   Endocrine positive - hyperlipidemia, diabetes, hypothyroidism.   Hematologic/Lymphatic negative       PSYCHIATRIC EXAMINATION/MENTAL STATUS  /64   Pulse 86   Resp 18   Ht 1.448 m (4' 9.01\")   Wt 49 kg (108 lb)   BMI 23.36 kg/m²   Participation: Active verbal " participation and Attentive  Grooming:Neat  Orientation: Alert and Fully Oriented  Eye contact: Good  Behavior:Calm   Mood: Anxious  Affect: Flexible and Full range  Thought process: Logical and Goal-directed  Thought content:  Within normal limits  Speech: Rate within normal limits and Volume within normal limits  Perception:  Within normal limits  Memory:  No gross evidence of memory deficits  Insight: Good  Judgment: Good  Family/couple interaction observations:   Other:    Current risk:    Suicide: Low   Homicide: Low   Self-harm: Low  Relapse: Low  Other:   Crisis Safety Plan reviewed?Yes  If evidence of imminent risk is present, intervention/plan:    Medical Records/Labs/Diagnostic Tests Reviewed: yes.    Medical Records/Labs/Diagnostic Tests Ordered: none.    DIAGNOSTIC IMPRESSION(S):  1. Bipolar 1 disorder, depressed (HCC)           ASSESSMENT AND PLAN:    1. Bipolar 1 disorder, depressed.  Duloxetine 60 mg bid.  Lamotrigine 200 mg bid  Abilify 5 mg one in am.    2. Follow up in three months.    16 minutes were spent in psychotherapy.  (If greater than 16 minutes, add [id number] code)   Topics addressed in psychotherapy include:  Psycho education and cognitive clarifications.  We discussed her negative automatic thoughts and helped her to process it.  Agreed to keep her daily routine and a good sleep cycle.  Jean Marie Solis M.D.

## 2018-11-29 ENCOUNTER — TELEPHONE (OUTPATIENT)
Dept: VASCULAR LAB | Facility: MEDICAL CENTER | Age: 78
End: 2018-11-29

## 2018-11-29 DIAGNOSIS — I65.22 LEFT CAROTID ARTERY STENOSIS: ICD-10-CM

## 2018-12-06 NOTE — ASSESSMENT & PLAN NOTE
- C/w dysphagia 1 diabetic diet   Telephone Encounter by Agnes North NCMA at 09/14/17 11:47 AM     Author:  Agnes North NCMA Service:  (none) Author Type:  Medical Assistant     Filed:  09/14/17 11:47 AM Encounter Date:  9/14/2017 Status:  Signed     :  Agnes North NCMA (Medical Assistant)       From: Faiza Samson  To: Rachel Collins DO  Sent: 9/14/2017  8:45 AM CDT  Subject: Medication Renewal Request    Original authorizing provider: DO Faiza Dugan would like a refill of the following medications:  omeprazole (PRILOSEC) 40 MG Cap [Rachel Collins DO]  fluticasone (FLONASE) 50 MCG/ACT nasal spray [Rachel Collins DO]    Preferred pharmacy: Other - [facility] (in [place]) or fax:   [phone]    Comment:  I have a new mail order pharmacy; would you please send in for refills for   me?  (I generally do three months' at a time)  [facility]   (they said it's the one in [place], if you're doing e-prescribing). Their   fax, should you need it, is [phone].  Thank you!       Revision History        Date/Time User Provider Type Action    > 09/14/17 11:47 AM Agnes North NCMA Medical Assistant Sign    Attribution information within the note text is not available.

## 2018-12-19 ENCOUNTER — TELEPHONE (OUTPATIENT)
Dept: VASCULAR LAB | Facility: MEDICAL CENTER | Age: 78
End: 2018-12-19

## 2019-01-10 ENCOUNTER — TELEPHONE (OUTPATIENT)
Dept: VASCULAR LAB | Facility: MEDICAL CENTER | Age: 79
End: 2019-01-10

## 2019-01-11 ENCOUNTER — TELEPHONE (OUTPATIENT)
Dept: VASCULAR LAB | Facility: MEDICAL CENTER | Age: 79
End: 2019-01-11

## 2019-01-11 NOTE — TELEPHONE ENCOUNTER
Pt daughter Eliane called and scheduled an appt for her mom to be seen. Pt daughter states she has been in [place] all of December. Scheduled 3-7-19 At 2:40pm

## 2019-01-11 NOTE — TELEPHONE ENCOUNTER
Patient overdue for follow-up appt with vascular care.  Medical assistant has been unable to reach and reschedule  Multiple messages have been left  Await further patient contact.  Pending further contact, will defer all vascular care, including management of cardiac vascular risk factors, to PCP    Michael J. Bloch, MD  Vascular Care    Cc:  CLEO Bernabe

## 2019-02-05 ENCOUNTER — TELEPHONE (OUTPATIENT)
Dept: VASCULAR LAB | Facility: MEDICAL CENTER | Age: 79
End: 2019-02-05

## 2019-02-05 NOTE — TELEPHONE ENCOUNTER
Called pt to remind that the  Carotid duplex  is due for surveillance. Scheduling office and our office phone numbers provided. NOE Silver.

## 2019-02-21 ENCOUNTER — TELEPHONE (OUTPATIENT)
Dept: VASCULAR LAB | Facility: MEDICAL CENTER | Age: 79
End: 2019-02-21

## 2019-03-07 ENCOUNTER — TELEPHONE (OUTPATIENT)
Dept: VASCULAR LAB | Facility: MEDICAL CENTER | Age: 79
End: 2019-03-07

## 2019-03-08 ENCOUNTER — TELEPHONE (OUTPATIENT)
Dept: VASCULAR LAB | Facility: MEDICAL CENTER | Age: 79
End: 2019-03-08

## 2019-03-21 ENCOUNTER — TELEPHONE (OUTPATIENT)
Dept: VASCULAR LAB | Facility: MEDICAL CENTER | Age: 79
End: 2019-03-21

## 2019-03-21 DIAGNOSIS — I65.22 CAROTID STENOSIS, LEFT: ICD-10-CM

## 2019-04-04 ENCOUNTER — TELEPHONE (OUTPATIENT)
Dept: VASCULAR LAB | Facility: MEDICAL CENTER | Age: 79
End: 2019-04-04

## 2019-04-11 ENCOUNTER — TELEPHONE (OUTPATIENT)
Dept: VASCULAR LAB | Facility: MEDICAL CENTER | Age: 79
End: 2019-04-11

## 2019-04-11 NOTE — TELEPHONE ENCOUNTER
Letter sent to patient explaining that multiple calls have been made in attempts to contact them regarding their vascular surveillance studies. Requested patient call clinic to schedule test as failure to obtain these studies in a timely fashion can lead to significant medical consequences. Phone number to clinic provided in correspondence. We will wait for the patient to respond for any future correspondence.   NOE Silver.

## 2019-12-24 NOTE — ASSESSMENT & PLAN NOTE
BIBA for alcohol intoxication. BGL of 127mg/dl in the field. Admits to alcohol use. Arrived ambulatory. +AOB, abrasion noted to forehead K and Mg were monitored and repleted

## 2020-04-09 NOTE — CARE PLAN
Problem: Safety  Goal: Will remain free from falls  Outcome: PROGRESSING AS EXPECTED  Pt. Educated on need to use call light for needs and to not attempt to exit bed without assistance from a health care provider    Problem: Infection  Goal: Will remain free from infection  Outcome: PROGRESSING AS EXPECTED  Pt. Displays no current signs of infection, and IV has 0 on infiltration or phlebitis rating.        No

## 2020-07-13 NOTE — CARE PLAN
Problem: Discharge Barriers/Planning  Goal: Patient's continuum of care needs will be met    Intervention: Collaborate with Transitional Care Team and Interdisciplinary Team to meet discharge needs  Include patient in dc planning          Refill requested:   Requested Prescriptions     Pending Prescriptions Disp Refills   • OXYBUTYNIN CHLORIDE ER 10 MG Oral Tablet 24 Hr [Pharmacy Med Name: OXYBUTYNIN CL ER 10 MG TABLET] 90 tablet 3     Sig: TAKE 1 TABLET BY MOUTH EVERY DAY       Last refill

## 2021-05-13 NOTE — TELEPHONE ENCOUNTER
Last seen by PCP 12/17. Will send 6 months to pharmacy.    
Was the patient seen in the last year in this department? Yes     Does patient have an active prescription for medications requested? No     Received Request Via: Pharmacy      Pt met protocol?: Yes, OV last month    
157.48

## 2022-05-05 NOTE — PROGRESS NOTES
Outcome: Left Message    Please transfer to Patient Outreach Team at [phone] when patient returns call.    WebIZ Checked & Epic Updated:  yes    [facility] Verified: no    Attempt # 1         none